# Patient Record
Sex: MALE | Race: WHITE | NOT HISPANIC OR LATINO
[De-identification: names, ages, dates, MRNs, and addresses within clinical notes are randomized per-mention and may not be internally consistent; named-entity substitution may affect disease eponyms.]

---

## 2017-01-31 ENCOUNTER — OTHER (OUTPATIENT)
Age: 72
End: 2017-01-31

## 2017-01-31 ENCOUNTER — APPOINTMENT (OUTPATIENT)
Dept: OTHER | Facility: CLINIC | Age: 72
End: 2017-01-31

## 2017-01-31 VITALS
DIASTOLIC BLOOD PRESSURE: 80 MMHG | BODY MASS INDEX: 38.37 KG/M2 | HEART RATE: 90 BPM | WEIGHT: 268 LBS | OXYGEN SATURATION: 96 % | SYSTOLIC BLOOD PRESSURE: 126 MMHG | HEIGHT: 70 IN

## 2017-02-07 ENCOUNTER — APPOINTMENT (OUTPATIENT)
Dept: DERMATOLOGY | Facility: CLINIC | Age: 72
End: 2017-02-07

## 2017-02-10 ENCOUNTER — APPOINTMENT (OUTPATIENT)
Dept: UROLOGY | Facility: CLINIC | Age: 72
End: 2017-02-10

## 2017-02-10 VITALS
SYSTOLIC BLOOD PRESSURE: 110 MMHG | WEIGHT: 261 LBS | BODY MASS INDEX: 37.37 KG/M2 | DIASTOLIC BLOOD PRESSURE: 74 MMHG | TEMPERATURE: 98.2 F | HEIGHT: 70 IN | HEART RATE: 89 BPM

## 2017-02-10 RX ORDER — ERGOCALCIFEROL 1.25 MG/1
1.25 MG CAPSULE, LIQUID FILLED ORAL
Qty: 4 | Refills: 0 | Status: ACTIVE | COMMUNITY
Start: 2016-09-29

## 2017-02-10 RX ORDER — CHLORHEXIDINE GLUCONATE 4 %
1000 LIQUID (ML) TOPICAL
Qty: 130 | Refills: 0 | Status: ACTIVE | COMMUNITY
Start: 2016-09-29

## 2017-02-13 LAB
APPEARANCE: CLEAR
BACTERIA: NEGATIVE
BILIRUBIN URINE: ABNORMAL
BLOOD URINE: NEGATIVE
COLOR: ABNORMAL
GLUCOSE QUALITATIVE U: NORMAL MG/DL
HYALINE CASTS: 14 /LPF
KETONES URINE: ABNORMAL
LEUKOCYTE ESTERASE URINE: NEGATIVE
MICROSCOPIC-UA: NORMAL
NITRITE URINE: NEGATIVE
PH URINE: 5.5
PROTEIN URINE: NEGATIVE MG/DL
PSA FREE FLD-MCNC: 17.3 %
PSA FREE SERPL-MCNC: 0.29 NG/ML
PSA SERPL-MCNC: 1.7 NG/ML
RED BLOOD CELLS URINE: 3 /HPF
SPECIFIC GRAVITY URINE: 1.03
SQUAMOUS EPITHELIAL CELLS: 1 /HPF
UROBILINOGEN URINE: NORMAL MG/DL
WHITE BLOOD CELLS URINE: 2 /HPF

## 2017-02-17 ENCOUNTER — APPOINTMENT (OUTPATIENT)
Dept: DERMATOLOGY | Facility: CLINIC | Age: 72
End: 2017-02-17

## 2017-02-27 ENCOUNTER — OUTPATIENT (OUTPATIENT)
Dept: OUTPATIENT SERVICES | Facility: HOSPITAL | Age: 72
LOS: 1 days | End: 2017-02-27
Payer: COMMERCIAL

## 2017-02-27 ENCOUNTER — APPOINTMENT (OUTPATIENT)
Dept: ULTRASOUND IMAGING | Facility: IMAGING CENTER | Age: 72
End: 2017-02-27

## 2017-02-27 DIAGNOSIS — N50.89 OTHER SPECIFIED DISORDERS OF THE MALE GENITAL ORGANS: ICD-10-CM

## 2017-02-27 PROCEDURE — 76870 US EXAM SCROTUM: CPT

## 2017-02-28 ENCOUNTER — APPOINTMENT (OUTPATIENT)
Dept: DERMATOLOGY | Facility: CLINIC | Age: 72
End: 2017-02-28

## 2017-02-28 VITALS — DIASTOLIC BLOOD PRESSURE: 72 MMHG | SYSTOLIC BLOOD PRESSURE: 126 MMHG

## 2017-02-28 DIAGNOSIS — Z12.83 ENCOUNTER FOR SCREENING FOR MALIGNANT NEOPLASM OF SKIN: ICD-10-CM

## 2017-02-28 DIAGNOSIS — Z85.828 PERSONAL HISTORY OF OTHER MALIGNANT NEOPLASM OF SKIN: ICD-10-CM

## 2017-02-28 DIAGNOSIS — L57.0 ACTINIC KERATOSIS: ICD-10-CM

## 2017-02-28 DIAGNOSIS — L82.1 OTHER SEBORRHEIC KERATOSIS: ICD-10-CM

## 2017-03-10 ENCOUNTER — APPOINTMENT (OUTPATIENT)
Dept: UROLOGY | Facility: CLINIC | Age: 72
End: 2017-03-10

## 2017-03-10 VITALS
HEIGHT: 70 IN | OXYGEN SATURATION: 97 % | SYSTOLIC BLOOD PRESSURE: 136 MMHG | HEART RATE: 64 BPM | BODY MASS INDEX: 38.65 KG/M2 | WEIGHT: 270 LBS | DIASTOLIC BLOOD PRESSURE: 68 MMHG | TEMPERATURE: 98.2 F

## 2017-03-10 DIAGNOSIS — Z86.39 PERSONAL HISTORY OF OTHER ENDOCRINE, NUTRITIONAL AND METABOLIC DISEASE: ICD-10-CM

## 2017-03-10 DIAGNOSIS — Z85.820 PERSONAL HISTORY OF MALIGNANT MELANOMA OF SKIN: ICD-10-CM

## 2017-03-10 DIAGNOSIS — E78.5 HYPERLIPIDEMIA, UNSPECIFIED: ICD-10-CM

## 2017-05-24 ENCOUNTER — MEDICATION RENEWAL (OUTPATIENT)
Age: 72
End: 2017-05-24

## 2017-06-13 ENCOUNTER — APPOINTMENT (OUTPATIENT)
Dept: OTHER | Facility: CLINIC | Age: 72
End: 2017-06-13

## 2017-06-13 VITALS
BODY MASS INDEX: 38.65 KG/M2 | HEIGHT: 70 IN | DIASTOLIC BLOOD PRESSURE: 80 MMHG | OXYGEN SATURATION: 91 % | WEIGHT: 270 LBS | SYSTOLIC BLOOD PRESSURE: 129 MMHG | HEART RATE: 88 BPM

## 2017-06-13 VITALS — TEMPERATURE: 97.7 F

## 2017-06-13 RX ORDER — DICLOFENAC SODIUM 50 MG/1
50 TABLET, DELAYED RELEASE ORAL
Qty: 30 | Refills: 0 | Status: COMPLETED | COMMUNITY
Start: 2016-12-08 | End: 2017-06-13

## 2017-06-13 RX ORDER — TOBRAMYCIN AND DEXAMETHASONE 3; 1 MG/ML; MG/ML
0.3-0.1 SUSPENSION/ DROPS OPHTHALMIC
Qty: 10 | Refills: 0 | Status: COMPLETED | COMMUNITY
Start: 2016-07-28 | End: 2017-06-13

## 2017-06-13 RX ORDER — ICOSAPENT ETHYL 1000 MG/1
1 CAPSULE ORAL
Qty: 120 | Refills: 0 | Status: COMPLETED | COMMUNITY
Start: 2016-07-20 | End: 2017-06-13

## 2017-06-13 RX ORDER — NAPROXEN 375 MG/1
375 TABLET ORAL
Qty: 20 | Refills: 0 | Status: COMPLETED | COMMUNITY
Start: 2016-11-21 | End: 2017-06-13

## 2017-07-03 ENCOUNTER — APPOINTMENT (OUTPATIENT)
Dept: PULMONOLOGY | Facility: CLINIC | Age: 72
End: 2017-07-03

## 2017-07-14 ENCOUNTER — APPOINTMENT (OUTPATIENT)
Dept: PULMONOLOGY | Facility: CLINIC | Age: 72
End: 2017-07-14

## 2017-07-14 VITALS
HEART RATE: 74 BPM | RESPIRATION RATE: 16 BRPM | TEMPERATURE: 98 F | DIASTOLIC BLOOD PRESSURE: 80 MMHG | HEIGHT: 70 IN | SYSTOLIC BLOOD PRESSURE: 139 MMHG | BODY MASS INDEX: 37.8 KG/M2 | WEIGHT: 264 LBS | OXYGEN SATURATION: 95 %

## 2017-07-14 DIAGNOSIS — R05 COUGH: ICD-10-CM

## 2017-07-14 DIAGNOSIS — R06.00 DYSPNEA, UNSPECIFIED: ICD-10-CM

## 2017-07-14 RX ORDER — BUPROPION HYDROCHLORIDE 100 MG/1
100 TABLET, FILM COATED, EXTENDED RELEASE ORAL
Qty: 30 | Refills: 0 | Status: COMPLETED | COMMUNITY
Start: 2017-06-21

## 2017-07-14 RX ORDER — BUPROPION HYDROCHLORIDE 150 MG/1
150 TABLET, EXTENDED RELEASE ORAL
Qty: 30 | Refills: 0 | Status: COMPLETED | COMMUNITY
Start: 2017-06-21

## 2017-08-25 ENCOUNTER — APPOINTMENT (OUTPATIENT)
Dept: PULMONOLOGY | Facility: CLINIC | Age: 72
End: 2017-08-25

## 2017-09-05 ENCOUNTER — MEDICATION RENEWAL (OUTPATIENT)
Age: 72
End: 2017-09-05

## 2017-09-12 ENCOUNTER — APPOINTMENT (OUTPATIENT)
Dept: DERMATOLOGY | Facility: CLINIC | Age: 72
End: 2017-09-12

## 2017-09-29 ENCOUNTER — APPOINTMENT (OUTPATIENT)
Dept: UROLOGY | Facility: CLINIC | Age: 72
End: 2017-09-29
Payer: MEDICARE

## 2017-09-29 VITALS
RESPIRATION RATE: 18 BRPM | HEIGHT: 70 IN | OXYGEN SATURATION: 96 % | SYSTOLIC BLOOD PRESSURE: 122 MMHG | HEART RATE: 70 BPM | BODY MASS INDEX: 39.22 KG/M2 | DIASTOLIC BLOOD PRESSURE: 80 MMHG | TEMPERATURE: 98 F | WEIGHT: 274 LBS

## 2017-09-29 DIAGNOSIS — K40.90 UNILATERAL INGUINAL HERNIA, W/OUT OBSTRUCTION OR GANGRENE, NOT SPECIFIED AS RECURRENT: ICD-10-CM

## 2017-09-29 DIAGNOSIS — Z85.820 PERSONAL HISTORY OF MALIGNANT MELANOMA OF SKIN: ICD-10-CM

## 2017-09-29 DIAGNOSIS — N43.40 SPERMATOCELE OF EPIDIDYMIS, UNSPECIFIED: ICD-10-CM

## 2017-09-29 DIAGNOSIS — N40.1 BENIGN PROSTATIC HYPERPLASIA WITH LOWER URINARY TRACT SYMPMS: ICD-10-CM

## 2017-09-29 DIAGNOSIS — N50.89 OTHER SPECIFIED DISORDERS OF THE MALE GENITAL ORGANS: ICD-10-CM

## 2017-09-29 PROCEDURE — 99213 OFFICE O/P EST LOW 20 MIN: CPT

## 2017-11-02 ENCOUNTER — APPOINTMENT (OUTPATIENT)
Dept: OTHER | Facility: CLINIC | Age: 72
End: 2017-11-02
Payer: COMMERCIAL

## 2017-11-02 VITALS
HEIGHT: 69 IN | RESPIRATION RATE: 18 BRPM | BODY MASS INDEX: 40.58 KG/M2 | OXYGEN SATURATION: 95 % | HEART RATE: 68 BPM | WEIGHT: 274 LBS | DIASTOLIC BLOOD PRESSURE: 77 MMHG | SYSTOLIC BLOOD PRESSURE: 129 MMHG

## 2017-11-02 DIAGNOSIS — C44.310 BASAL CELL CARCINOMA OF SKIN OF UNSPECIFIED PARTS OF FACE: ICD-10-CM

## 2017-11-02 PROCEDURE — 94010 BREATHING CAPACITY TEST: CPT

## 2017-11-02 PROCEDURE — 99397 PER PM REEVAL EST PAT 65+ YR: CPT

## 2017-11-02 PROCEDURE — 99214 OFFICE O/P EST MOD 30 MIN: CPT | Mod: 25

## 2017-11-02 PROCEDURE — 96150: CPT

## 2017-11-02 RX ORDER — PREDNISONE 10 MG/1
10 TABLET ORAL
Qty: 10 | Refills: 0 | Status: COMPLETED | COMMUNITY
Start: 2017-06-13 | End: 2017-11-02

## 2017-11-02 RX ORDER — AZITHROMYCIN 250 MG/1
250 TABLET, FILM COATED ORAL
Qty: 6 | Refills: 0 | Status: COMPLETED | COMMUNITY
Start: 2017-06-13 | End: 2017-11-02

## 2017-11-02 RX ORDER — GUAIFENESIN 600 MG/1
600 TABLET, EXTENDED RELEASE ORAL
Qty: 60 | Refills: 3 | Status: COMPLETED | COMMUNITY
Start: 2017-01-31 | End: 2017-11-02

## 2017-11-02 RX ORDER — CYCLOBENZAPRINE HYDROCHLORIDE 10 MG/1
10 TABLET, FILM COATED ORAL
Qty: 30 | Refills: 0 | Status: COMPLETED | COMMUNITY
Start: 2016-11-21 | End: 2017-11-02

## 2017-11-02 RX ORDER — BENZONATATE 100 MG/1
100 CAPSULE ORAL
Qty: 45 | Refills: 1 | Status: COMPLETED | COMMUNITY
Start: 2017-06-13 | End: 2017-11-02

## 2017-11-03 LAB
ALBUMIN SERPL ELPH-MCNC: 4.1 G/DL
ALP BLD-CCNC: 50 U/L
ALT SERPL-CCNC: 31 U/L
ANION GAP SERPL CALC-SCNC: 18 MMOL/L
APPEARANCE: CLEAR
AST SERPL-CCNC: 26 U/L
BASOPHILS # BLD AUTO: 0.04 K/UL
BASOPHILS NFR BLD AUTO: 0.5 %
BILIRUB SERPL-MCNC: 0.3 MG/DL
BILIRUBIN URINE: NEGATIVE
BLOOD URINE: NEGATIVE
BUN SERPL-MCNC: 16 MG/DL
CALCIUM SERPL-MCNC: 8.7 MG/DL
CHLORIDE SERPL-SCNC: 103 MMOL/L
CHOLEST SERPL-MCNC: 136 MG/DL
CHOLEST/HDLC SERPL: 4.4 RATIO
CO2 SERPL-SCNC: 22 MMOL/L
COLOR: YELLOW
CREAT SERPL-MCNC: 1.06 MG/DL
EOSINOPHIL # BLD AUTO: 0.16 K/UL
EOSINOPHIL NFR BLD AUTO: 2.2 %
GLUCOSE QUALITATIVE U: NEGATIVE MG/DL
GLUCOSE SERPL-MCNC: 111 MG/DL
HCT VFR BLD CALC: 47.1 %
HDLC SERPL-MCNC: 31 MG/DL
HGB BLD-MCNC: 15.3 G/DL
IMM GRANULOCYTES NFR BLD AUTO: 0.1 %
KETONES URINE: NEGATIVE
LDLC SERPL CALC-MCNC: 71 MG/DL
LEUKOCYTE ESTERASE URINE: NEGATIVE
LYMPHOCYTES # BLD AUTO: 2.34 K/UL
LYMPHOCYTES NFR BLD AUTO: 32 %
MAN DIFF?: NORMAL
MCHC RBC-ENTMCNC: 30.7 PG
MCHC RBC-ENTMCNC: 32.5 GM/DL
MCV RBC AUTO: 94.6 FL
MONOCYTES # BLD AUTO: 0.56 K/UL
MONOCYTES NFR BLD AUTO: 7.7 %
NEUTROPHILS # BLD AUTO: 4.21 K/UL
NEUTROPHILS NFR BLD AUTO: 57.5 %
NITRITE URINE: NEGATIVE
PH URINE: 5.5
PLATELET # BLD AUTO: 165 K/UL
POTASSIUM SERPL-SCNC: 4.7 MMOL/L
PROT SERPL-MCNC: 6.8 G/DL
PROTEIN URINE: NEGATIVE MG/DL
RBC # BLD: 4.98 M/UL
RBC # FLD: 14.5 %
SODIUM SERPL-SCNC: 143 MMOL/L
SPECIFIC GRAVITY URINE: 1.03
TRIGL SERPL-MCNC: 172 MG/DL
UROBILINOGEN URINE: NEGATIVE MG/DL
WBC # FLD AUTO: 7.32 K/UL

## 2018-01-25 ENCOUNTER — APPOINTMENT (OUTPATIENT)
Dept: GASTROENTEROLOGY | Facility: CLINIC | Age: 73
End: 2018-01-25
Payer: MEDICARE

## 2018-01-25 VITALS
HEART RATE: 62 BPM | TEMPERATURE: 97.9 F | OXYGEN SATURATION: 96 % | DIASTOLIC BLOOD PRESSURE: 82 MMHG | WEIGHT: 274 LBS | HEIGHT: 69 IN | SYSTOLIC BLOOD PRESSURE: 134 MMHG | BODY MASS INDEX: 40.58 KG/M2

## 2018-01-25 DIAGNOSIS — Z12.11 ENCOUNTER FOR SCREENING FOR MALIGNANT NEOPLASM OF COLON: ICD-10-CM

## 2018-01-25 PROCEDURE — 99204 OFFICE O/P NEW MOD 45 MIN: CPT

## 2018-01-25 RX ORDER — POLYETHYLENE GLYCOL 3350 17 G/17G
17 POWDER, FOR SOLUTION ORAL
Qty: 238 | Refills: 0 | Status: ACTIVE | COMMUNITY
Start: 2018-01-25 | End: 1900-01-01

## 2018-02-09 ENCOUNTER — APPOINTMENT (OUTPATIENT)
Dept: DERMATOLOGY | Facility: CLINIC | Age: 73
End: 2018-02-09

## 2018-03-01 ENCOUNTER — RX RENEWAL (OUTPATIENT)
Age: 73
End: 2018-03-01

## 2018-04-07 ENCOUNTER — RX RENEWAL (OUTPATIENT)
Age: 73
End: 2018-04-07

## 2018-06-12 ENCOUNTER — MEDICATION RENEWAL (OUTPATIENT)
Age: 73
End: 2018-06-12

## 2018-07-24 PROBLEM — C44.310 BASAL CELL CARCINOMA OF FACE: Status: ACTIVE | Noted: 2017-11-02

## 2018-09-11 ENCOUNTER — RX RENEWAL (OUTPATIENT)
Age: 73
End: 2018-09-11

## 2018-11-19 ENCOUNTER — APPOINTMENT (OUTPATIENT)
Dept: OTHER | Facility: CLINIC | Age: 73
End: 2018-11-19
Payer: COMMERCIAL

## 2018-11-19 VITALS
SYSTOLIC BLOOD PRESSURE: 128 MMHG | HEART RATE: 66 BPM | DIASTOLIC BLOOD PRESSURE: 80 MMHG | RESPIRATION RATE: 16 BRPM | HEIGHT: 70 IN | OXYGEN SATURATION: 96 % | BODY MASS INDEX: 38.94 KG/M2 | WEIGHT: 272 LBS

## 2018-11-19 DIAGNOSIS — J32.9 CHRONIC SINUSITIS, UNSPECIFIED: ICD-10-CM

## 2018-11-19 DIAGNOSIS — G47.33 OBSTRUCTIVE SLEEP APNEA (ADULT) (PEDIATRIC): ICD-10-CM

## 2018-11-19 DIAGNOSIS — K21.9 GASTRO-ESOPHAGEAL REFLUX DISEASE W/OUT ESOPHAGITIS: ICD-10-CM

## 2018-11-19 DIAGNOSIS — Z87.891 PERSONAL HISTORY OF NICOTINE DEPENDENCE: ICD-10-CM

## 2018-11-19 DIAGNOSIS — Z04.9 ENCOUNTER FOR EXAMINATION AND OBSERVATION FOR UNSPECIFIED REASON: ICD-10-CM

## 2018-11-19 PROCEDURE — 94010 BREATHING CAPACITY TEST: CPT

## 2018-11-19 PROCEDURE — 96150: CPT

## 2018-11-19 PROCEDURE — 99396 PREV VISIT EST AGE 40-64: CPT | Mod: 25

## 2018-11-19 PROCEDURE — G0008: CPT

## 2018-11-19 PROCEDURE — 99214 OFFICE O/P EST MOD 30 MIN: CPT | Mod: 25

## 2018-11-19 PROCEDURE — 90686 IIV4 VACC NO PRSV 0.5 ML IM: CPT

## 2018-11-19 RX ORDER — FOLIC ACID 1 MG/1
1 TABLET ORAL
Qty: 150 | Refills: 0 | Status: COMPLETED | COMMUNITY
Start: 2016-12-08 | End: 2018-11-19

## 2018-11-19 RX ORDER — MAG HYDROX/ALUMINUM HYD/SIMETH 200-225-25
SUSPENSION, ORAL (FINAL DOSE FORM) ORAL
Refills: 0 | Status: COMPLETED | COMMUNITY
End: 2018-11-19

## 2018-11-19 RX ORDER — TAMSULOSIN HYDROCHLORIDE 0.4 MG/1
0.4 CAPSULE ORAL
Qty: 90 | Refills: 4 | Status: COMPLETED | COMMUNITY
Start: 2017-02-10 | End: 2018-11-19

## 2018-11-19 RX ORDER — FINASTERIDE 5 MG/1
5 TABLET, FILM COATED ORAL
Qty: 90 | Refills: 0 | Status: COMPLETED | COMMUNITY
Start: 2017-03-10 | End: 2018-11-19

## 2018-11-20 LAB
ALBUMIN SERPL ELPH-MCNC: 4.2 G/DL
ALP BLD-CCNC: 53 U/L
ALT SERPL-CCNC: 20 U/L
ANION GAP SERPL CALC-SCNC: 11 MMOL/L
APPEARANCE: CLEAR
AST SERPL-CCNC: 16 U/L
BACTERIA: NEGATIVE
BASOPHILS # BLD AUTO: 0.03 K/UL
BASOPHILS NFR BLD AUTO: 0.3 %
BILIRUB SERPL-MCNC: 0.4 MG/DL
BILIRUBIN URINE: NEGATIVE
BLOOD URINE: NEGATIVE
BUN SERPL-MCNC: 19 MG/DL
CALCIUM SERPL-MCNC: 8.3 MG/DL
CHLORIDE SERPL-SCNC: 106 MMOL/L
CHOLEST SERPL-MCNC: 137 MG/DL
CHOLEST/HDLC SERPL: 4.4 RATIO
CO2 SERPL-SCNC: 26 MMOL/L
COLOR: YELLOW
CREAT SERPL-MCNC: 1.14 MG/DL
EOSINOPHIL # BLD AUTO: 0.1 K/UL
EOSINOPHIL NFR BLD AUTO: 1 %
GLUCOSE QUALITATIVE U: NEGATIVE MG/DL
GLUCOSE SERPL-MCNC: 114 MG/DL
HCT VFR BLD CALC: 48.6 %
HDLC SERPL-MCNC: 31 MG/DL
HGB BLD-MCNC: 15.4 G/DL
HYALINE CASTS: 2 /LPF
IMM GRANULOCYTES NFR BLD AUTO: 0.3 %
KETONES URINE: NEGATIVE
LDLC SERPL CALC-MCNC: 72 MG/DL
LEUKOCYTE ESTERASE URINE: NEGATIVE
LYMPHOCYTES # BLD AUTO: 2.57 K/UL
LYMPHOCYTES NFR BLD AUTO: 26.9 %
MAN DIFF?: NORMAL
MCHC RBC-ENTMCNC: 31.1 PG
MCHC RBC-ENTMCNC: 31.7 GM/DL
MCV RBC AUTO: 98.2 FL
MICROSCOPIC-UA: NORMAL
MONOCYTES # BLD AUTO: 0.66 K/UL
MONOCYTES NFR BLD AUTO: 6.9 %
NEUTROPHILS # BLD AUTO: 6.17 K/UL
NEUTROPHILS NFR BLD AUTO: 64.6 %
NITRITE URINE: NEGATIVE
PH URINE: 5
PLATELET # BLD AUTO: 160 K/UL
POTASSIUM SERPL-SCNC: 4.6 MMOL/L
PROT SERPL-MCNC: 6.9 G/DL
PROTEIN URINE: NEGATIVE MG/DL
RBC # BLD: 4.95 M/UL
RBC # FLD: 14.5 %
RED BLOOD CELLS URINE: 1 /HPF
SODIUM SERPL-SCNC: 143 MMOL/L
SPECIFIC GRAVITY URINE: 1.03
SQUAMOUS EPITHELIAL CELLS: 1 /HPF
TRIGL SERPL-MCNC: 172 MG/DL
UROBILINOGEN URINE: NEGATIVE MG/DL
WBC # FLD AUTO: 9.56 K/UL
WHITE BLOOD CELLS URINE: 1 /HPF

## 2018-11-29 ENCOUNTER — APPOINTMENT (OUTPATIENT)
Dept: DERMATOLOGY | Facility: CLINIC | Age: 73
End: 2018-11-29

## 2018-12-02 ENCOUNTER — FORM ENCOUNTER (OUTPATIENT)
Age: 73
End: 2018-12-02

## 2018-12-09 ENCOUNTER — FORM ENCOUNTER (OUTPATIENT)
Age: 73
End: 2018-12-09

## 2019-01-29 ENCOUNTER — APPOINTMENT (OUTPATIENT)
Dept: GASTROENTEROLOGY | Facility: CLINIC | Age: 74
End: 2019-01-29

## 2019-04-21 ENCOUNTER — TRANSCRIPTION ENCOUNTER (OUTPATIENT)
Age: 74
End: 2019-04-21

## 2019-05-02 ENCOUNTER — FORM ENCOUNTER (OUTPATIENT)
Age: 74
End: 2019-05-02

## 2019-05-16 ENCOUNTER — FORM ENCOUNTER (OUTPATIENT)
Age: 74
End: 2019-05-16

## 2019-06-17 ENCOUNTER — APPOINTMENT (OUTPATIENT)
Dept: PSYCHIATRY | Facility: CLINIC | Age: 74
End: 2019-06-17
Payer: COMMERCIAL

## 2019-06-17 DIAGNOSIS — F41.0 PANIC DISORDER [EPISODIC PAROXYSMAL ANXIETY]: ICD-10-CM

## 2019-06-17 DIAGNOSIS — F33.9 MAJOR DEPRESSIVE DISORDER, RECURRENT, UNSPECIFIED: ICD-10-CM

## 2019-06-17 DIAGNOSIS — F51.04 PSYCHOPHYSIOLOGIC INSOMNIA: ICD-10-CM

## 2019-06-17 DIAGNOSIS — F41.9 ANXIETY DISORDER, UNSPECIFIED: ICD-10-CM

## 2019-06-17 DIAGNOSIS — F10.21 ALCOHOL DEPENDENCE, IN REMISSION: ICD-10-CM

## 2019-06-17 DIAGNOSIS — F43.10 POST-TRAUMATIC STRESS DISORDER, UNSPECIFIED: ICD-10-CM

## 2019-06-17 PROCEDURE — 99205 OFFICE O/P NEW HI 60 MIN: CPT

## 2019-06-17 RX ORDER — CLONAZEPAM 0.25 MG/1
0.25 TABLET, ORALLY DISINTEGRATING ORAL DAILY
Qty: 30 | Refills: 0 | Status: ACTIVE | COMMUNITY
Start: 2016-08-25 | End: 1900-01-01

## 2019-06-17 RX ORDER — CITALOPRAM HYDROBROMIDE 20 MG/1
20 TABLET, FILM COATED ORAL
Qty: 30 | Refills: 2 | Status: ACTIVE | COMMUNITY
Start: 2019-06-17 | End: 1900-01-01

## 2019-07-01 ENCOUNTER — APPOINTMENT (OUTPATIENT)
Dept: PULMONOLOGY | Facility: CLINIC | Age: 74
End: 2019-07-01

## 2019-07-26 ENCOUNTER — EMERGENCY (EMERGENCY)
Facility: HOSPITAL | Age: 74
LOS: 1 days | Discharge: HOME | End: 2019-07-26
Attending: EMERGENCY MEDICINE
Payer: COMMERCIAL

## 2019-07-26 VITALS
DIASTOLIC BLOOD PRESSURE: 53 MMHG | TEMPERATURE: 98 F | SYSTOLIC BLOOD PRESSURE: 124 MMHG | RESPIRATION RATE: 18 BRPM | HEART RATE: 62 BPM | OXYGEN SATURATION: 98 %

## 2019-07-26 VITALS
TEMPERATURE: 99 F | WEIGHT: 229.94 LBS | RESPIRATION RATE: 18 BRPM | HEART RATE: 60 BPM | SYSTOLIC BLOOD PRESSURE: 107 MMHG | HEIGHT: 70 IN | OXYGEN SATURATION: 98 % | DIASTOLIC BLOOD PRESSURE: 67 MMHG

## 2019-07-26 LAB
ALBUMIN SERPL ELPH-MCNC: 3.4 G/DL — LOW (ref 3.5–5)
ALP SERPL-CCNC: 58 U/L — SIGNIFICANT CHANGE UP (ref 40–120)
ALT FLD-CCNC: 48 U/L DA — SIGNIFICANT CHANGE UP (ref 10–60)
ANION GAP SERPL CALC-SCNC: 8 MMOL/L — SIGNIFICANT CHANGE UP (ref 5–17)
AST SERPL-CCNC: 24 U/L — SIGNIFICANT CHANGE UP (ref 10–40)
BASE EXCESS BLDV CALC-SCNC: 1.2 MMOL/L — SIGNIFICANT CHANGE UP (ref -2–2)
BASOPHILS # BLD AUTO: 0.05 K/UL — SIGNIFICANT CHANGE UP (ref 0–0.2)
BASOPHILS NFR BLD AUTO: 0.5 % — SIGNIFICANT CHANGE UP (ref 0–2)
BILIRUB SERPL-MCNC: 0.6 MG/DL — SIGNIFICANT CHANGE UP (ref 0.2–1.2)
BLOOD GAS COMMENTS, VENOUS: SIGNIFICANT CHANGE UP
BUN SERPL-MCNC: 18 MG/DL — SIGNIFICANT CHANGE UP (ref 7–18)
CALCIUM SERPL-MCNC: 8.2 MG/DL — LOW (ref 8.4–10.5)
CHLORIDE SERPL-SCNC: 106 MMOL/L — SIGNIFICANT CHANGE UP (ref 96–108)
CO2 SERPL-SCNC: 23 MMOL/L — SIGNIFICANT CHANGE UP (ref 22–31)
CREAT SERPL-MCNC: 1.21 MG/DL — SIGNIFICANT CHANGE UP (ref 0.5–1.3)
EOSINOPHIL # BLD AUTO: 0.33 K/UL — SIGNIFICANT CHANGE UP (ref 0–0.5)
EOSINOPHIL NFR BLD AUTO: 3 % — SIGNIFICANT CHANGE UP (ref 0–6)
GLUCOSE SERPL-MCNC: 103 MG/DL — HIGH (ref 70–99)
HCO3 BLDV-SCNC: 23 MMOL/L — SIGNIFICANT CHANGE UP (ref 21–29)
HCT VFR BLD CALC: 41.5 % — SIGNIFICANT CHANGE UP (ref 39–50)
HGB BLD-MCNC: 13.6 G/DL — SIGNIFICANT CHANGE UP (ref 13–17)
HOROWITZ INDEX BLDV+IHG-RTO: 21 — SIGNIFICANT CHANGE UP
IMM GRANULOCYTES NFR BLD AUTO: 0.3 % — SIGNIFICANT CHANGE UP (ref 0–1.5)
LYMPHOCYTES # BLD AUTO: 28.9 % — SIGNIFICANT CHANGE UP (ref 13–44)
LYMPHOCYTES # BLD AUTO: 3.17 K/UL — SIGNIFICANT CHANGE UP (ref 1–3.3)
MCHC RBC-ENTMCNC: 30.5 PG — SIGNIFICANT CHANGE UP (ref 27–34)
MCHC RBC-ENTMCNC: 32.8 GM/DL — SIGNIFICANT CHANGE UP (ref 32–36)
MCV RBC AUTO: 93 FL — SIGNIFICANT CHANGE UP (ref 80–100)
MONOCYTES # BLD AUTO: 0.83 K/UL — SIGNIFICANT CHANGE UP (ref 0–0.9)
MONOCYTES NFR BLD AUTO: 7.6 % — SIGNIFICANT CHANGE UP (ref 2–14)
NEUTROPHILS # BLD AUTO: 6.55 K/UL — SIGNIFICANT CHANGE UP (ref 1.8–7.4)
NEUTROPHILS NFR BLD AUTO: 59.7 % — SIGNIFICANT CHANGE UP (ref 43–77)
NRBC # BLD: 0 /100 WBCS — SIGNIFICANT CHANGE UP (ref 0–0)
NT-PROBNP SERPL-SCNC: 141 PG/ML — HIGH (ref 0–125)
PCO2 BLDV: 30 MMHG — LOW (ref 35–50)
PH BLDV: 7.5 — HIGH (ref 7.35–7.45)
PLATELET # BLD AUTO: 173 K/UL — SIGNIFICANT CHANGE UP (ref 150–400)
PO2 BLDV: 34 MMHG — SIGNIFICANT CHANGE UP (ref 25–45)
POTASSIUM SERPL-MCNC: 4.1 MMOL/L — SIGNIFICANT CHANGE UP (ref 3.5–5.3)
POTASSIUM SERPL-SCNC: 4.1 MMOL/L — SIGNIFICANT CHANGE UP (ref 3.5–5.3)
PROT SERPL-MCNC: 6.9 G/DL — SIGNIFICANT CHANGE UP (ref 6–8.3)
RBC # BLD: 4.46 M/UL — SIGNIFICANT CHANGE UP (ref 4.2–5.8)
RBC # FLD: 13.8 % — SIGNIFICANT CHANGE UP (ref 10.3–14.5)
SAO2 % BLDV: 68 % — SIGNIFICANT CHANGE UP (ref 67–88)
SODIUM SERPL-SCNC: 137 MMOL/L — SIGNIFICANT CHANGE UP (ref 135–145)
TROPONIN I SERPL-MCNC: <0.015 NG/ML — SIGNIFICANT CHANGE UP (ref 0–0.04)
WBC # BLD: 10.96 K/UL — HIGH (ref 3.8–10.5)
WBC # FLD AUTO: 10.96 K/UL — HIGH (ref 3.8–10.5)

## 2019-07-26 PROCEDURE — 93005 ELECTROCARDIOGRAM TRACING: CPT

## 2019-07-26 PROCEDURE — 99283 EMERGENCY DEPT VISIT LOW MDM: CPT

## 2019-07-26 PROCEDURE — 99283 EMERGENCY DEPT VISIT LOW MDM: CPT | Mod: 25

## 2019-07-26 PROCEDURE — 71046 X-RAY EXAM CHEST 2 VIEWS: CPT | Mod: 26

## 2019-07-26 PROCEDURE — 82803 BLOOD GASES ANY COMBINATION: CPT

## 2019-07-26 PROCEDURE — 36415 COLL VENOUS BLD VENIPUNCTURE: CPT

## 2019-07-26 PROCEDURE — 71046 X-RAY EXAM CHEST 2 VIEWS: CPT

## 2019-07-26 PROCEDURE — 84484 ASSAY OF TROPONIN QUANT: CPT

## 2019-07-26 PROCEDURE — 85027 COMPLETE CBC AUTOMATED: CPT

## 2019-07-26 PROCEDURE — 80053 COMPREHEN METABOLIC PANEL: CPT

## 2019-07-26 PROCEDURE — 83880 ASSAY OF NATRIURETIC PEPTIDE: CPT

## 2019-07-26 RX ORDER — DIAZEPAM 5 MG
5 TABLET ORAL ONCE
Refills: 0 | Status: DISCONTINUED | OUTPATIENT
Start: 2019-07-26 | End: 2019-07-26

## 2019-07-26 RX ADMIN — Medication 5 MILLIGRAM(S): at 09:16

## 2019-07-26 NOTE — ED PROVIDER NOTE - CLINICAL SUMMARY MEDICAL DECISION MAKING FREE TEXT BOX
72 y/o M patient presents to the ER c/o throat tightness as well as shortness of breath. Will obtain labs, CXR, EKG, give valium and reassess.

## 2019-07-26 NOTE — ED PROVIDER NOTE - CONSTITUTIONAL, MLM
normal... Well appearing, well nourished, awake, alert, oriented to person, place, time/situation and in no apparent distress. Well appearing resting comfortable in stretcher, well nourished, awake, alert, oriented to person, place, time/situation and in no acute distress.

## 2019-07-26 NOTE — ED ADULT NURSE NOTE - OBJECTIVE STATEMENT
Pt present to ED, c/o SOB on exertion. Denies any pain, mild sob without using accessary muscle noted. speak clear and full sentences, non diaphoretic.

## 2019-07-26 NOTE — ED PROVIDER NOTE - OBJECTIVE STATEMENT
72 y/o M pt with a PMHx of anxiety, hypothyroidism, presents to the ED with complaints of shortness of breath  x 1 week and a sensation that his throat is closing up. Patient states he was recently discharged from [facility] for the same complaint. 74 y/o M pt with a PMHx of anxiety, hypothyroidism, presents to the ED with complaints of shortness of breath  x 1 week and a sensation that his throat is closing up. Patient states he was recently discharged from [facility] for the same complaint. Patient reports he currently feels anxious and used his albuterol inhaler his morning with no improvement. Patient denies chest pain, cough, fever, nausea, vomiting, diarrhea, abdominal pain, headache, dizziness or any other complaints. SHx:  at 911.

## 2019-07-26 NOTE — ED ADULT TRIAGE NOTE - CHIEF COMPLAINT QUOTE
BIBA for c/o shortness of breath  states he was just recently discharge in a hospital for the same problem. pt no labored breathing  able to speak in full sentences.

## 2019-07-26 NOTE — ED PROVIDER NOTE - CHPI ED SYMPTOMS NEG
no fever/no headache/no nausea, no vomiting, no diarrhea, no abdominal pain, no dizziness/no chest pain/no cough

## 2019-07-26 NOTE — ED PROVIDER NOTE - NSFOLLOWUPINSTRUCTIONS_ED_ALL_ED_FT
Anxiety    Generalized anxiety disorder (CHAPIS) is a mental disorder. It is defined as anxiety that is not necessarily related to specific events or activities or is out of proportion to said events. Symptoms include restlessness, fatigue, difficulty concentrations, irritability and difficulty concentrating. It may interfere with life functions, including relationships, work, and school. If you were started on a medication, make sure to take exactly as prescribed and follow up with a psychiatrist.    SEEK IMMEDIATE MEDICAL CARE IF YOU HAVE ANY OF THE FOLLOWING SYMPTOMS: thoughts about hurting killing yourself, thoughts about hurting or killing somebody else, hallucinations, or worsening depression.

## 2019-07-31 PROBLEM — F41.9 ANXIETY DISORDER, UNSPECIFIED: Chronic | Status: ACTIVE | Noted: 2019-07-26

## 2019-07-31 PROBLEM — E03.9 HYPOTHYROIDISM, UNSPECIFIED: Chronic | Status: ACTIVE | Noted: 2019-07-26

## 2019-08-01 ENCOUNTER — APPOINTMENT (OUTPATIENT)
Dept: OTHER | Facility: CLINIC | Age: 74
End: 2019-08-01
Payer: COMMERCIAL

## 2019-08-01 VITALS
HEIGHT: 70 IN | BODY MASS INDEX: 38.94 KG/M2 | DIASTOLIC BLOOD PRESSURE: 67 MMHG | RESPIRATION RATE: 16 BRPM | HEART RATE: 67 BPM | WEIGHT: 272 LBS | OXYGEN SATURATION: 97 % | SYSTOLIC BLOOD PRESSURE: 129 MMHG

## 2019-08-01 VITALS
HEIGHT: 70 IN | RESPIRATION RATE: 16 BRPM | DIASTOLIC BLOOD PRESSURE: 67 MMHG | HEART RATE: 67 BPM | BODY MASS INDEX: 38.94 KG/M2 | SYSTOLIC BLOOD PRESSURE: 129 MMHG | WEIGHT: 272 LBS | OXYGEN SATURATION: 97 %

## 2019-08-01 DIAGNOSIS — C44.91 BASAL CELL CARCINOMA OF SKIN, UNSPECIFIED: ICD-10-CM

## 2019-08-01 DIAGNOSIS — J41.0 SIMPLE CHRONIC BRONCHITIS: ICD-10-CM

## 2019-08-01 DIAGNOSIS — Z03.89 ENCOUNTER FOR OBSERVATION FOR OTHER SUSPECTED DISEASES AND CONDITIONS RULED OUT: ICD-10-CM

## 2019-08-01 PROCEDURE — 99214 OFFICE O/P EST MOD 30 MIN: CPT

## 2019-08-02 ENCOUNTER — INPATIENT (INPATIENT)
Facility: HOSPITAL | Age: 74
LOS: 5 days | Discharge: HOME | DRG: 191 | End: 2019-08-08
Attending: INTERNAL MEDICINE | Admitting: INTERNAL MEDICINE
Payer: MEDICARE

## 2019-08-02 VITALS
SYSTOLIC BLOOD PRESSURE: 123 MMHG | HEART RATE: 59 BPM | DIASTOLIC BLOOD PRESSURE: 74 MMHG | HEIGHT: 70 IN | RESPIRATION RATE: 22 BRPM | WEIGHT: 220.02 LBS | TEMPERATURE: 98 F | OXYGEN SATURATION: 97 %

## 2019-08-02 DIAGNOSIS — Z86.711 PERSONAL HISTORY OF PULMONARY EMBOLISM: Chronic | ICD-10-CM

## 2019-08-02 DIAGNOSIS — F41.0 PANIC DISORDER [EPISODIC PAROXYSMAL ANXIETY]: ICD-10-CM

## 2019-08-02 DIAGNOSIS — F41.9 ANXIETY DISORDER, UNSPECIFIED: ICD-10-CM

## 2019-08-02 DIAGNOSIS — F43.10 POST-TRAUMATIC STRESS DISORDER, UNSPECIFIED: ICD-10-CM

## 2019-08-02 DIAGNOSIS — E03.9 HYPOTHYROIDISM, UNSPECIFIED: Chronic | ICD-10-CM

## 2019-08-02 DIAGNOSIS — Z29.9 ENCOUNTER FOR PROPHYLACTIC MEASURES, UNSPECIFIED: ICD-10-CM

## 2019-08-02 DIAGNOSIS — Z98.890 OTHER SPECIFIED POSTPROCEDURAL STATES: Chronic | ICD-10-CM

## 2019-08-02 DIAGNOSIS — E78.5 HYPERLIPIDEMIA, UNSPECIFIED: ICD-10-CM

## 2019-08-02 DIAGNOSIS — E78.5 HYPERLIPIDEMIA, UNSPECIFIED: Chronic | ICD-10-CM

## 2019-08-02 DIAGNOSIS — J44.1 CHRONIC OBSTRUCTIVE PULMONARY DISEASE WITH (ACUTE) EXACERBATION: ICD-10-CM

## 2019-08-02 DIAGNOSIS — D64.9 ANEMIA, UNSPECIFIED: ICD-10-CM

## 2019-08-02 DIAGNOSIS — J44.9 CHRONIC OBSTRUCTIVE PULMONARY DISEASE, UNSPECIFIED: ICD-10-CM

## 2019-08-02 DIAGNOSIS — R06.00 DYSPNEA, UNSPECIFIED: ICD-10-CM

## 2019-08-02 LAB
ALBUMIN SERPL ELPH-MCNC: 3.2 G/DL — LOW (ref 3.5–5)
ALP SERPL-CCNC: 53 U/L — SIGNIFICANT CHANGE UP (ref 40–120)
ALT FLD-CCNC: 34 U/L DA — SIGNIFICANT CHANGE UP (ref 10–60)
ANION GAP SERPL CALC-SCNC: 9 MMOL/L — SIGNIFICANT CHANGE UP (ref 5–17)
APTT BLD: 32 SEC — SIGNIFICANT CHANGE UP (ref 27.5–36.3)
AST SERPL-CCNC: 30 U/L — SIGNIFICANT CHANGE UP (ref 10–40)
BASOPHILS # BLD AUTO: 0.05 K/UL — SIGNIFICANT CHANGE UP (ref 0–0.2)
BASOPHILS NFR BLD AUTO: 0.6 % — SIGNIFICANT CHANGE UP (ref 0–2)
BILIRUB SERPL-MCNC: 0.2 MG/DL — SIGNIFICANT CHANGE UP (ref 0.2–1.2)
BUN SERPL-MCNC: 27 MG/DL — HIGH (ref 7–18)
CALCIUM SERPL-MCNC: 7.3 MG/DL — LOW (ref 8.4–10.5)
CHLORIDE SERPL-SCNC: 112 MMOL/L — HIGH (ref 96–108)
CK MB BLD-MCNC: 1.9 % — SIGNIFICANT CHANGE UP (ref 0–3.5)
CK MB CFR SERPL CALC: 4.5 NG/ML — HIGH (ref 0–3.6)
CK SERPL-CCNC: 242 U/L — HIGH (ref 35–232)
CO2 SERPL-SCNC: 22 MMOL/L — SIGNIFICANT CHANGE UP (ref 22–31)
CREAT SERPL-MCNC: 1.12 MG/DL — SIGNIFICANT CHANGE UP (ref 0.5–1.3)
EOSINOPHIL # BLD AUTO: 0.14 K/UL — SIGNIFICANT CHANGE UP (ref 0–0.5)
EOSINOPHIL NFR BLD AUTO: 1.5 % — SIGNIFICANT CHANGE UP (ref 0–6)
GLUCOSE SERPL-MCNC: 96 MG/DL — SIGNIFICANT CHANGE UP (ref 70–99)
HCT VFR BLD CALC: 39.5 % — SIGNIFICANT CHANGE UP (ref 39–50)
HGB BLD-MCNC: 12.9 G/DL — LOW (ref 13–17)
IMM GRANULOCYTES NFR BLD AUTO: 0.3 % — SIGNIFICANT CHANGE UP (ref 0–1.5)
INR BLD: 1.16 RATIO — SIGNIFICANT CHANGE UP (ref 0.88–1.16)
LYMPHOCYTES # BLD AUTO: 2.92 K/UL — SIGNIFICANT CHANGE UP (ref 1–3.3)
LYMPHOCYTES # BLD AUTO: 32.3 % — SIGNIFICANT CHANGE UP (ref 13–44)
MCHC RBC-ENTMCNC: 30.8 PG — SIGNIFICANT CHANGE UP (ref 27–34)
MCHC RBC-ENTMCNC: 32.7 GM/DL — SIGNIFICANT CHANGE UP (ref 32–36)
MCV RBC AUTO: 94.3 FL — SIGNIFICANT CHANGE UP (ref 80–100)
MONOCYTES # BLD AUTO: 0.79 K/UL — SIGNIFICANT CHANGE UP (ref 0–0.9)
MONOCYTES NFR BLD AUTO: 8.7 % — SIGNIFICANT CHANGE UP (ref 2–14)
NEUTROPHILS # BLD AUTO: 5.12 K/UL — SIGNIFICANT CHANGE UP (ref 1.8–7.4)
NEUTROPHILS NFR BLD AUTO: 56.6 % — SIGNIFICANT CHANGE UP (ref 43–77)
NRBC # BLD: 0 /100 WBCS — SIGNIFICANT CHANGE UP (ref 0–0)
PLATELET # BLD AUTO: 153 K/UL — SIGNIFICANT CHANGE UP (ref 150–400)
POTASSIUM SERPL-MCNC: 4.1 MMOL/L — SIGNIFICANT CHANGE UP (ref 3.5–5.3)
POTASSIUM SERPL-SCNC: 4.1 MMOL/L — SIGNIFICANT CHANGE UP (ref 3.5–5.3)
PROT SERPL-MCNC: 6.2 G/DL — SIGNIFICANT CHANGE UP (ref 6–8.3)
PROTHROM AB SERPL-ACNC: 12.9 SEC — SIGNIFICANT CHANGE UP (ref 10–12.9)
RBC # BLD: 4.19 M/UL — LOW (ref 4.2–5.8)
RBC # FLD: 14.3 % — SIGNIFICANT CHANGE UP (ref 10.3–14.5)
SODIUM SERPL-SCNC: 143 MMOL/L — SIGNIFICANT CHANGE UP (ref 135–145)
TROPONIN I SERPL-MCNC: <0.015 NG/ML — SIGNIFICANT CHANGE UP (ref 0–0.04)
WBC # BLD: 9.05 K/UL — SIGNIFICANT CHANGE UP (ref 3.8–10.5)
WBC # FLD AUTO: 9.05 K/UL — SIGNIFICANT CHANGE UP (ref 3.8–10.5)

## 2019-08-02 PROCEDURE — 99285 EMERGENCY DEPT VISIT HI MDM: CPT

## 2019-08-02 PROCEDURE — 71275 CT ANGIOGRAPHY CHEST: CPT | Mod: 26

## 2019-08-02 RX ORDER — TIOTROPIUM BROMIDE 18 UG/1
1 CAPSULE ORAL; RESPIRATORY (INHALATION) DAILY
Refills: 0 | Status: DISCONTINUED | OUTPATIENT
Start: 2019-08-02 | End: 2019-08-08

## 2019-08-02 RX ORDER — AZITHROMYCIN 500 MG/1
500 TABLET, FILM COATED ORAL ONCE
Refills: 0 | Status: COMPLETED | OUTPATIENT
Start: 2019-08-02 | End: 2019-08-02

## 2019-08-02 RX ORDER — LEVOTHYROXINE SODIUM 125 MCG
175 TABLET ORAL DAILY
Refills: 0 | Status: DISCONTINUED | OUTPATIENT
Start: 2019-08-02 | End: 2019-08-08

## 2019-08-02 RX ORDER — CITALOPRAM 10 MG/1
20 TABLET, FILM COATED ORAL DAILY
Refills: 0 | Status: DISCONTINUED | OUTPATIENT
Start: 2019-08-02 | End: 2019-08-08

## 2019-08-02 RX ORDER — BUDESONIDE AND FORMOTEROL FUMARATE DIHYDRATE 160; 4.5 UG/1; UG/1
2 AEROSOL RESPIRATORY (INHALATION)
Refills: 0 | Status: DISCONTINUED | OUTPATIENT
Start: 2019-08-02 | End: 2019-08-08

## 2019-08-02 RX ORDER — AZITHROMYCIN 500 MG/1
TABLET, FILM COATED ORAL
Refills: 0 | Status: COMPLETED | OUTPATIENT
Start: 2019-08-02 | End: 2019-08-06

## 2019-08-02 RX ORDER — CLONAZEPAM 1 MG
0.25 TABLET ORAL DAILY
Refills: 0 | Status: DISCONTINUED | OUTPATIENT
Start: 2019-08-02 | End: 2019-08-08

## 2019-08-02 RX ORDER — APIXABAN 2.5 MG/1
5 TABLET, FILM COATED ORAL EVERY 12 HOURS
Refills: 0 | Status: DISCONTINUED | OUTPATIENT
Start: 2019-08-03 | End: 2019-08-08

## 2019-08-02 RX ORDER — IPRATROPIUM/ALBUTEROL SULFATE 18-103MCG
3 AEROSOL WITH ADAPTER (GRAM) INHALATION EVERY 6 HOURS
Refills: 0 | Status: DISCONTINUED | OUTPATIENT
Start: 2019-08-02 | End: 2019-08-08

## 2019-08-02 RX ORDER — BUPROPION HYDROCHLORIDE 150 MG/1
300 TABLET, EXTENDED RELEASE ORAL DAILY
Refills: 0 | Status: DISCONTINUED | OUTPATIENT
Start: 2019-08-02 | End: 2019-08-08

## 2019-08-02 RX ORDER — CLONAZEPAM 1 MG
0.25 TABLET ORAL DAILY
Refills: 0 | Status: DISCONTINUED | OUTPATIENT
Start: 2019-08-02 | End: 2019-08-02

## 2019-08-02 RX ORDER — AZITHROMYCIN 500 MG/1
250 TABLET, FILM COATED ORAL EVERY 24 HOURS
Refills: 0 | Status: COMPLETED | OUTPATIENT
Start: 2019-08-03 | End: 2019-08-06

## 2019-08-02 RX ORDER — ENOXAPARIN SODIUM 100 MG/ML
100 INJECTION SUBCUTANEOUS ONCE
Refills: 0 | Status: COMPLETED | OUTPATIENT
Start: 2019-08-02 | End: 2019-08-02

## 2019-08-02 RX ORDER — ENOXAPARIN SODIUM 100 MG/ML
40 INJECTION SUBCUTANEOUS DAILY
Refills: 0 | Status: DISCONTINUED | OUTPATIENT
Start: 2019-08-02 | End: 2019-08-02

## 2019-08-02 RX ORDER — SIMVASTATIN 20 MG/1
40 TABLET, FILM COATED ORAL AT BEDTIME
Refills: 0 | Status: DISCONTINUED | OUTPATIENT
Start: 2019-08-02 | End: 2019-08-08

## 2019-08-02 RX ORDER — TRAZODONE HCL 50 MG
150 TABLET ORAL DAILY
Refills: 0 | Status: DISCONTINUED | OUTPATIENT
Start: 2019-08-02 | End: 2019-08-04

## 2019-08-02 RX ADMIN — SIMVASTATIN 40 MILLIGRAM(S): 20 TABLET, FILM COATED ORAL at 22:47

## 2019-08-02 RX ADMIN — Medication 150 MILLIGRAM(S): at 23:33

## 2019-08-02 RX ADMIN — AZITHROMYCIN 250 MILLIGRAM(S): 500 TABLET, FILM COATED ORAL at 22:46

## 2019-08-02 RX ADMIN — ENOXAPARIN SODIUM 100 MILLIGRAM(S): 100 INJECTION SUBCUTANEOUS at 14:34

## 2019-08-02 NOTE — H&P ADULT - ASSESSMENT
72 yo male exsmoker with COPD, hypothyroidism, HLD, PTSD and hx of recent PE s/p 1 week of eliquis, presented to the ED with SOB and was found to be tachypneic . He c/o orthopnea, PND. CTA was done to r/o PE as his hx was significant for recent PE and noncompliance with medication. Pt was admitted for dyspnea and r/o CHF 72 yo male exsmoker with COPD, hypothyroidism, HLD, PTSD and hx of recent PE s/p 1 week of eliquis, presented to the ED with SOB and was found to be tachypneic . He c/o orthopnea, PND. CTA was done to r/o PE as his hx was significant for recent PE and noncompliance with medication. Pt was admitted for dyspnea to r/o CHF exacerbation vs COPD exacerbation. 72 yo male exsmoker with COPD, hypothyroidism, HLD, PTSD and hx of recent PE s/p 1 week of eliquis, presented to the ED with SOB and was found to be tachypneic . He c/o orthopnea, PND. CTA was done to r/o PE as his hx was significant for recent PE and noncompliance with medication. Pt was admitted for dyspnea to r/o CHF exacerbation vs COPD exacerbation.      primary team to obtain records for PE  work up from [facility]

## 2019-08-02 NOTE — H&P ADULT - HISTORY OF PRESENT ILLNESS
74 yo male exsmoker with COPD, hypothyroidism, HLD, PTSD and hx of recent PE s/p 1 week of eliquis, came with complains of difficulty breathing. Pt states his SOB started a month ago and he has been admitted to the hospital 3 times in the past month. He states he has SOB with exertion, lying flat in bed, and wakes up gasping for air. He says he has lower ext edema. 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 Pt was admitted to [facility] 3 weeks ago and was diagnosed with PE. He was discharged on 7 days of Eliquis and didn't follow up with a doctor about this. Pt doesn't remember how he was this morning but had an appointment with his PCP who heard about his recent complains and told him to seek immediate attention at the ED. He said he was feeling SOB, dizzy, blurry vision his heart was racing, he felt weak and thought he was going to past out. Pt denies CP, headache, fevers, or chills. 72 yo male exsmoker with COPD, hypothyroidism, HLD, PTSD and hx of recent PE s/p 1 week of eliquis, came with complains of difficulty breathing. Pt states his SOB started a month ago and he has been admitted to the hospital 3 times in the past month. He states he has SOB with exertion, lying flat in bed, and wakes up gasping for air. He can walk up to 3 blocks before getting SOB. He says he has lower ext edema. 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 Pt was admitted to [facility] 3 weeks ago and was diagnosed with PE. He was discharged on 7 days of Eliquis and didn't follow up with a doctor about this. Pt doesn't remember how he was this morning but had an appointment with his PCP who heard about his recent complains and told him to seek immediate attention at the ED. He said he was feeling SOB, dizzy, blurry vision his heart was racing, he felt weak and thought he was going to past out. Pt has a dry cough, no hemoptysis, he has lost 40lbs, doesn't have anorexia denies CP, headache, fevers, or chills.

## 2019-08-02 NOTE — H&P ADULT - PROBLEM SELECTOR PLAN 8
IMPROVE VTE Individual Risk Assessment  RISK                                                                Points  [ x] Previous VTE                                                  3  [  ] Thrombophilia                                               2    [  ] Lower limb paralysis                                      2        (unable to hold up >15 seconds)      [  ] Current Cancer                                              2         (within 6 months)  [  ] Immobilization > 24 hrs                                1  [  ] ICU/CCU stay > 24 hours                              1    [ x ] Age > 60                                                      1    IMPROVE VTE Score 4  c/w eliquis

## 2019-08-02 NOTE — ED ADULT NURSE NOTE - PAIN RATING/NUMBER SCALE (0-10): REST
" Home Care Instructions                                                                                                                  Name:Satya Aguilar  Medical Record Number:[id number]  CSN: [id number]    YOB: 1954   Age: 62 y.o.  Sex: male  HT:1.854 m (6' 1\") WT: 94.1 kg (207 lb 7.3 oz)          Admit Date: 6/1/2017     Discharge Date:   Today's Date: 6/3/2017  Attending Doctor:  Mario Alberto Alejandre M.D.                  Allergies:  Review of patient's allergies indicates no known allergies.            Discharge Instructions       Discharge Instructions    Discharged to home by taxi with relative. Discharged via wheelchair, hospital escort: Yes.  Special equipment needed: Not Applicable    Be sure to schedule a follow-up appointment with your primary care doctor or any specialists as instructed.     Discharge Plan:   Diet Plan: Discussed  Activity Level: Discussed  Confirmed Follow up Appointment: Appointment Scheduled  Confirmed Symptoms Management: Discussed  Medication Reconciliation Updated: Yes  Influenza Vaccine Indication: Not indicated: Previously immunized this influenza season and > 8 years of age    I understand that a diet low in cholesterol, fat, and sodium is recommended for good health. Unless I have been given specific instructions below for another diet, I accept this instruction as my diet prescription.   Other diet:     Special Instructions: None    · Is patient discharged on Warfarin / Coumadin?   No     · Is patient Post Blood Transfusion?  No    Depression / Suicide Risk    As you are discharged from [facility], it is important to learn how to keep safe from harming yourself.    Recognize the warning signs:  · Abrupt changes in personality, positive or negative- including increase in energy   · Giving away possessions  · Change in eating patterns- significant weight changes-  positive or negative  · Change in sleeping patterns- unable to sleep or sleeping all the " time   · Unwillingness or inability to communicate  · Depression  · Unusual sadness, discouragement and loneliness  · Talk of wanting to die  · Neglect of personal appearance   · Rebelliousness- reckless behavior  · Withdrawal from people/activities they love  · Confusion- inability to concentrate     If you or a loved one observes any of these behaviors or has concerns about self-harm, here's what you can do:  · Talk about it- your feelings and reasons for harming yourself  · Remove any means that you might use to hurt yourself (examples: pills, rope, extension cords, firearm)  · Get professional help from the community (Mental Health, Substance Abuse, psychological counseling)  · Do not be alone:Call your Safe Contact- someone whom you trust who will be there for you.  · Call your local CRISIS HOTLINE 485-9060 or 590-264-9182  · Call your local Children's Mobile Crisis Response Team [place] (525) 529-1723 or www.[employer]  · Call the toll free National Suicide Prevention Hotlines   · National Suicide Prevention Lifeline 753-859-LAHS (0825)  · [place] Hope Line Network 800-SUICIDE (384-3830)      Syncope  Syncope is a medical term for fainting or passing out. This means you lose consciousness and drop to the ground. People are generally unconscious for less than 5 minutes. You may have some muscle twitches for up to 15 seconds before waking up and returning to normal. Syncope occurs more often in older adults, but it can happen to anyone. While most causes of syncope are not dangerous, syncope can be a sign of a serious medical problem. It is important to seek medical care.   CAUSES   Syncope is caused by a sudden drop in blood flow to the brain. The specific cause is often not determined. Factors that can bring on syncope include:  · Taking medicines that lower blood pressure.  · Sudden changes in posture, such as standing up quickly.  · Taking more medicine than prescribed.  · Standing in one place  for too long.  · Seizure disorders.  · Dehydration and excessive exposure to heat.  · Low blood sugar (hypoglycemia).  · Straining to have a bowel movement.  · Heart disease, irregular heartbeat, or other circulatory problems.  · Fear, emotional distress, seeing blood, or severe pain.  SYMPTOMS   Right before fainting, you may:  2. Feel dizzy or light-headed.  3. Feel nauseous.  4. See all white or all black in your field of vision.  5. Have cold, clammy skin.  DIAGNOSIS   Your health care provider will ask about your symptoms, perform a physical exam, and perform an electrocardiogram (ECG) to record the electrical activity of your heart. Your health care provider may also perform other heart or blood tests to determine the cause of your syncope which may include:  2. Transthoracic echocardiogram (TTE). During echocardiography, sound waves are used to evaluate how blood flows through your heart.  3. Transesophageal echocardiogram (SONIA).  4. Cardiac monitoring. This allows your health care provider to monitor your heart rate and rhythm in real time.  5. Holter monitor. This is a portable device that records your heartbeat and can help diagnose heart arrhythmias. It allows your health care provider to track your heart activity for several days, if needed.  6. Stress tests by exercise or by giving medicine that makes the heart beat faster.  TREATMENT   In most cases, no treatment is needed. Depending on the cause of your syncope, your health care provider may recommend changing or stopping some of your medicines.  HOME CARE INSTRUCTIONS  2. Have someone stay with you until you feel stable.  3. Do not drive, use machinery, or play sports until your health care provider says it is okay.  4. Keep all follow-up appointments as directed by your health care provider.  5. Lie down right away if you start feeling like you might faint. Breathe deeply and steadily. Wait until all the symptoms have passed.  6. Drink enough fluids  to keep your urine clear or pale yellow.  7. If you are taking blood pressure or heart medicine, get up slowly and take several minutes to sit and then stand. This can reduce dizziness.  SEEK IMMEDIATE MEDICAL CARE IF:   2. You have a severe headache.  3. You have unusual pain in the chest, abdomen, or back.  4. You are bleeding from your mouth or rectum, or you have black or tarry stool.  5. You have an irregular or very fast heartbeat.  6. You have pain with breathing.  7. You have repeated fainting or seizure-like jerking during an episode.  8. You faint when sitting or lying down.  9. You have confusion.  10. You have trouble walking.  11. You have severe weakness.  12. You have vision problems.  If you fainted, call your local emergency services (911 in [place] Do not drive yourself to the hospital.      This information is not intended to replace advice given to you by your health care provider. Make sure you discuss any questions you have with your health care provider.     Document Released: 12/18/2006 Document Revised: 05/03/2016 Document Reviewed: 02/15/2013  [employer] Interactive Patient Education ©2016 [employer]      Your appointments     Sep 13, 2017  3:15 PM   FOLLOW UP with Loyd Hou M.D.   [facility] (--)    [address] [place] [address] [address]  [facility] [id number] [phone]              Follow-up Information     1. Follow up with Mohsen Tamasaby, M.D. In 1 week.    Specialty:  Family Medicine    Contact information    [address]  [facility] [address]  [phone]           Discharge Medication Instructions:    Below are the medications your physician expects you to take upon discharge:    Review all your home medications and newly ordered medications with your doctor and/or pharmacist. Follow medication instructions as directed by your doctor and/or pharmacist.    Please keep your medication list with you and share with your physician.                Medication List      CONTINUE taking these medications        Instructions    Morning Afternoon Evening Bedtime    albuterol 108 (90 BASE) MCG/ACT Aers inhalation aerosol   Next Dose Due:  Take as needed.           Inhale 2 Puffs by mouth every 1 hour as needed.   Dose:  2 Puff                        amiodarone 200 MG Tabs   Last time this was given:  200 mg on 6/3/2017  9:39 AM   Commonly known as:  CORDARONE   Next Dose Due:  Take tomorrow morning. 6/4/2017        Take 1 Tab by mouth every day.   Dose:  200 mg                        aspirin 81 MG tablet   Next Dose Due:  Take tomorrow morning. 6/4/2017        Take 81 mg by mouth every day.   Dose:  81 mg                        gabapentin 300 MG Caps   Last time this was given:  300 mg on 6/3/2017  6:46 AM   Commonly known as:  NEURONTIN   Next Dose Due:  Take 2 more times today. 6/3/2017        Take 300 mg by mouth 3 times a day.   Dose:  300 mg                        gemfibrozil 600 MG Tabs   Last time this was given:  600 mg on 6/3/2017  9:39 AM   Commonly known as:  LOPID   Next Dose Due:  Take tonight. 6/3/2017        Take 600 mg by mouth 2 times a day.   Dose:  600 mg                        lovastatin 40 MG tablet   Last time this was given:  40 mg on 6/2/2017  8:25 PM   Commonly known as:  MEVACOR   Next Dose Due:  Take tonight. 6/3/2017        Take 1 Tab by mouth every evening.   Dose:  40 mg                        metformin 1000 MG tablet   Commonly known as:  GLUCOPHAGE   Next Dose Due:  Take tonight with dinner. 6/3/2017        Take 1,000 mg by mouth 2 times a day, with meals.   Dose:  1000 mg                        RISPERDAL 4 MG tablet   Last time this was given:  4 mg on 6/2/2017  9:52 PM   Generic drug:  risperidone   Next Dose Due:  Take tonight. 6/3/2017        Take 4 mg by mouth every evening.   Dose:  4 mg                        tiotropium 18 MCG Caps   Last time this was given:  1 Cap on 6/3/2017  9:39 AM   Commonly known as:  SPIRIVA    Next Dose Due:  Take tomorrow morning. 6/4/2017        Inhale 1 Cap by mouth every day.   Dose:  18 mcg                          STOP taking these medications     citalopram 10 MG tablet   Commonly known as:  CELEXA               glyBURIDE 2.5 MG Tabs   Commonly known as:  DIABETA               metoprolol 25 MG Tabs   Commonly known as:  LOPRESSOR               rivaroxaban 20 MG Tabs tablet   Commonly known as:  XARELTO                       Instructions           Diet / Nutrition:    Follow any diet instructions given to you by your doctor or the dietician, including how much salt (sodium) you are allowed each day.    If you are overweight, talk to your doctor about a weight reduction plan.    Activity:    Remain physically active following your doctor's instructions about exercise and activity.    Rest often.     Any time you become even a little tired or short of breath, SIT DOWN and rest.    Worsening Symptoms:    Report any of the following signs and symptoms to the doctor's office immediately:    *Pain of jaw, arm, or neck  *Chest pain not relieved by medication                               *Dizziness or loss of consciousness  *Difficulty breathing even when at rest   *More tired than usual                                       *Bleeding drainage or swelling of surgical site  *Swelling of feet, ankles, legs or stomach                 *Fever (>100ºF)  *Pink or blood tinged sputum  *Weight gain (3lbs/day or 5lbs /week)           *Shock from internal defibrillator (if applicable)  *Palpitations or irregular heartbeats                *Cool and/or numb extremities    Stroke Awareness    Common Risk Factors for Stroke include:    Age  Atrial Fibrillation  Carotid Artery Stenosis  Diabetes Mellitus  Excessive alcohol consumption  High blood pressure  Overweight   Physical inactivity  Smoking    Warning signs and symptoms of a stroke include:    *Sudden numbness or weakness of the face, arm or leg (especially on  one side of the body).  *Sudden confusion, trouble speaking or understanding.  *Sudden trouble seeing in one or both eyes.  *Sudden trouble walking, dizziness, loss of balance or coordination.Sudden severe headache with no known cause.    It is very important to get treatment quickly when a stroke occurs. If you experience any of the above warning signs, call 911 immediately.                   Disclaimer         Quit Smoking / Tobacco Use:    I understand the use of any tobacco products increases my chance of suffering from future heart disease or stroke and could cause other illnesses which may shorten my life. Quitting the use of tobacco products is the single most important thing I can do to improve my health. For further information on smoking / tobacco cessation call a Toll Free Quit Line at 1-646.751.2611 (*National Cancer [place]) or 1-569.215.2260 (American Lung Association) or you can access the web based program at www.lungusa.org.    [place] Tobacco Users Help Line:  (351) 585-1985       Toll Free: 1-393.641.8117  Quit Tobacco Program [facility] (337)872-0763    Crisis Hotline:    [place] Crisis Hotline:  8-400-HNQKDEE or 1-112.334.6357    [place] Crisis Hotline:    1-146.567.7779 or 782-885-6067    Discharge Survey:   [facility] you for choosing [facility]. We hope we did everything we could to make your hospital stay a pleasant one. You may be receiving a phone survey and we would appreciate your time and participation in answering the questions. Your input is very valuable to us in our efforts to improve our service to our patients and their families.        My signature on this form indicates that:    1. I have reviewed and understand the above information.  2. My questions regarding this information have been answered to my satisfaction.  3. I have formulated a plan with my discharge nurse to obtain my prescribed medications for home.                  Disclaimer          __________________________________                     __________       ________                       Patient Signature                                                 Date                    Time         0

## 2019-08-02 NOTE — ED PROVIDER NOTE - PROGRESS NOTE DETAILS
Nemes - CTA neg, evaluated at bedside, comfortable, in NAD. Will admit to Dr Aaron for COPD exacerbation, further dyspnea w/u. MAR aware

## 2019-08-02 NOTE — H&P ADULT - NSICDXPASTMEDICALHX_GEN_ALL_CORE_FT
PAST MEDICAL HISTORY:  Anxiety     Chronic GERD     COPD with asthma     History of pulmonary embolus (PE)     HLD (hyperlipidemia)     Hypothyroidism     Post traumatic stress disorder (PTSD)

## 2019-08-02 NOTE — H&P ADULT - NSHPSOCIALHISTORY_GEN_ALL_CORE
Ex smoker- PT used to smoke 3PPD for 10 years. 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

## 2019-08-02 NOTE — H&P ADULT - PROBLEM SELECTOR PLAN 5
pt on citalopram, trazodone, bu proprion, clnazepam  - continue home meds pt on citalopram, trazodone, buproprion, clonazepam  - continue home meds - pt on citalopram, trazodone, buproprion, clonazepam  - continue home meds

## 2019-08-02 NOTE — H&P ADULT - PROBLEM SELECTOR PLAN 3
hgb 12.9 - hgb 12.9  - f/u cbc am hx of PTSD  Pt on 4 psych medications  need psych consult hx of PTSD  Pt on 4 psych medications  need psych consult- Dr. Bryant consulted

## 2019-08-02 NOTE — H&P ADULT - PROBLEM SELECTOR PLAN 4
pt on citalopram, trazodone, bu proprion, clnazepam  - continue home meds pt on citalopram, trazodone, buproprion, clonazepam  - continue home meds - hgb 12.9  - f/u cbc am

## 2019-08-02 NOTE — H&P ADULT - PROBLEM SELECTOR PLAN 2
- At home on fluticasone nasal spray  - hx of smoking - At home on fluticasone nasal spray  - hx of smoking  - ABG shows alkalosis  - give NC 2L if SO2 <92% CHF exacerbation vs COPD exacerbation  CTA negative for PE  get an echo?  pt not on any anticoagulations  BNP elevated 181  ABG shows alkalosis  CK and CKMB elevated COPD exacerbation vs Acute CHF vs panic attack (as pt has PTSD) or from PE it self as pt was recently diagnosed with PE  pt was not taking eliquis at home as he did not have medications   today CTA negative for PE  will get echo for dyspnea/ orthopnea   BNP elevated 181 mildly elevated   cardio Dr. Brumfield

## 2019-08-02 NOTE — H&P ADULT - NSHPPHYSICALEXAM_GEN_ALL_CORE
· CONSTITUTIONAL: Well appearing, well nourished, awake, alert, oriented to person, place, time/situation and in no apparent distress.  · ENMT: Airway patent, Nasal mucosa clear. Mouth with normal mucosa. Throat has no vesicles, no oropharyngeal exudates and uvula is midline.  · EYES: Clear bilaterally, pupils equal, round and reactive to light.  · CARDIAC: Normal rate, regular rhythm.  Heart sounds S1, S2.  No murmurs, rubs or gallops.  · RESPIRATORY: clear breath sounds bilaterally. No wheezing noted  · GASTROINTESTINAL: Abdomen soft, mild tenderness with deep palpation, no guarding.  · MUSCULOSKELETAL: No pain. 1+ lower extremity edema, no calf tenderness  · NEUROLOGICAL: Alert and oriented, no focal deficits, no motor or sensory deficits.  · SKIN: Skin normal color for race, warm, dry and intact. No evidence of rash. surgical scar on chest  · PSYCHIATRIC: Alert and oriented to person, place, time/situation. normal mood and affect. no apparent risk to self or others.  · HEME LYMPH: No adenopathy or splenomegaly. No cervical or inguinal lymphadenopathy. Vital Signs Last 24 Hrs  T(C): 37.1 (02 Aug 2019 15:38), Max: 37.1 (02 Aug 2019 15:38)  T(F): 98.7 (02 Aug 2019 15:38), Max: 98.7 (02 Aug 2019 15:38)  HR: 52 (02 Aug 2019 15:38) (52 - 59)  BP: 125/64 (02 Aug 2019 15:38) (123/74 - 125/64)  BP(mean): --  RR: 20 (02 Aug 2019 15:38) (20 - 22)  SpO2: 100% (02 Aug 2019 15:38) (97% - 100%)    · CONSTITUTIONAL: Well appearing, well nourished, awake, alert, oriented to person, place, time/situation and in no apparent distress.  · ENMT: Airway patent, Nasal mucosa clear. Mouth with normal mucosa. Throat has no vesicles, no oropharyngeal exudates and uvula is midline.  · EYES: Clear bilaterally, pupils equal, round and reactive to light.  · CARDIAC: Normal rate, regular rhythm.  Heart sounds S1, S2.  No murmurs, rubs or gallops.  · RESPIRATORY: clear breath sounds bilaterally. No wheezing noted  · GASTROINTESTINAL: Abdomen soft, mild tenderness with deep palpation, no guarding.  · MUSCULOSKELETAL: No pain. 1+ lower extremity edema, no calf tenderness  · NEUROLOGICAL: Alert and oriented, no focal deficits, no motor or sensory deficits.  · SKIN: Skin normal color for race, warm, dry and intact. No evidence of rash. surgical scar on chest  · PSYCHIATRIC: Alert and oriented to person, place, time/situation. normal mood and affect. no apparent risk to self or others.  · HEME LYMPH: No adenopathy or splenomegaly. No cervical or inguinal lymphadenopathy.

## 2019-08-02 NOTE — H&P ADULT - PROBLEM SELECTOR PLAN 6
continue simvastatin 40 mg pt on citalopram, trazodone, buproprion, clonazepam  - continue home meds

## 2019-08-02 NOTE — ED ADULT NURSE NOTE - ED STAT RN HANDOFF DETAILS
Pt remains stable, AOX4, no s/s of any distress noted. IV line in place, no signs of infiltration noted. VS WNL. Call bell in reach, bed in lowest position. Safety maintained, hourly rounding performed. Endorsed to nurse Paez.

## 2019-08-02 NOTE — H&P ADULT - PROBLEM SELECTOR PLAN 1
CHF exacerbation vs COPD exacerbation  CTA negative for PE  pt not on any anticoagulations  BNP elevated 181  ABG shows alkalosis CHF exacerbation vs COPD exacerbation  CTA negative for PE  pt not on any anticoagulations  BNP elevated 181  ABG shows alkalosis  CK and CKMB elevated - At home on fluticasone nasal spray  - start on bronchodilators q6h  - hx of smoking  - ABG shows alkalosis  - give NC 2L if SO2 <92%  - pulmonary consult - At home on fluticasone nasal spray  - start on bronchodilators and zithromax for copd exacerbation vs  bronchitis   - hx of smoking  - ABG shows alkalosis  - give NC 2L if SO2 <92%  - pulmonary consulted- Dr. Thrasher

## 2019-08-02 NOTE — ED ADULT NURSE NOTE - OBJECTIVE STATEMENT
Pt AOx4, ambulatory, h/o PE, sent from PCP for evaluation, Pt c/o of SOB. Denies CP, n/v/f. Pt endorses having lungs problems since 9/11, where he was a . No distress noted.

## 2019-08-03 DIAGNOSIS — F41.1 GENERALIZED ANXIETY DISORDER: ICD-10-CM

## 2019-08-03 DIAGNOSIS — J44.1 CHRONIC OBSTRUCTIVE PULMONARY DISEASE WITH (ACUTE) EXACERBATION: ICD-10-CM

## 2019-08-03 LAB
24R-OH-CALCIDIOL SERPL-MCNC: 30.8 NG/ML — SIGNIFICANT CHANGE UP (ref 30–80)
ANION GAP SERPL CALC-SCNC: 5 MMOL/L — SIGNIFICANT CHANGE UP (ref 5–17)
BASOPHILS # BLD AUTO: 0.05 K/UL — SIGNIFICANT CHANGE UP (ref 0–0.2)
BASOPHILS NFR BLD AUTO: 0.6 % — SIGNIFICANT CHANGE UP (ref 0–2)
BUN SERPL-MCNC: 19 MG/DL — HIGH (ref 7–18)
CALCIUM SERPL-MCNC: 7.2 MG/DL — LOW (ref 8.4–10.5)
CHLORIDE SERPL-SCNC: 113 MMOL/L — HIGH (ref 96–108)
CHOLEST SERPL-MCNC: 121 MG/DL — SIGNIFICANT CHANGE UP (ref 10–199)
CO2 SERPL-SCNC: 25 MMOL/L — SIGNIFICANT CHANGE UP (ref 22–31)
CREAT SERPL-MCNC: 1.15 MG/DL — SIGNIFICANT CHANGE UP (ref 0.5–1.3)
EOSINOPHIL # BLD AUTO: 0.28 K/UL — SIGNIFICANT CHANGE UP (ref 0–0.5)
EOSINOPHIL NFR BLD AUTO: 3.2 % — SIGNIFICANT CHANGE UP (ref 0–6)
FOLATE SERPL-MCNC: 17.8 NG/ML — SIGNIFICANT CHANGE UP
GLUCOSE SERPL-MCNC: 95 MG/DL — SIGNIFICANT CHANGE UP (ref 70–99)
HCT VFR BLD CALC: 40.4 % — SIGNIFICANT CHANGE UP (ref 39–50)
HCV AB S/CO SERPL IA: 0.08 S/CO — SIGNIFICANT CHANGE UP (ref 0–0.99)
HCV AB SERPL-IMP: SIGNIFICANT CHANGE UP
HDLC SERPL-MCNC: 29 MG/DL — LOW
HGB BLD-MCNC: 13.1 G/DL — SIGNIFICANT CHANGE UP (ref 13–17)
IMM GRANULOCYTES NFR BLD AUTO: 0.3 % — SIGNIFICANT CHANGE UP (ref 0–1.5)
LIPID PNL WITH DIRECT LDL SERPL: 59 MG/DL — SIGNIFICANT CHANGE UP
LYMPHOCYTES # BLD AUTO: 3.29 K/UL — SIGNIFICANT CHANGE UP (ref 1–3.3)
LYMPHOCYTES # BLD AUTO: 37.8 % — SIGNIFICANT CHANGE UP (ref 13–44)
MAGNESIUM SERPL-MCNC: 2.1 MG/DL — SIGNIFICANT CHANGE UP (ref 1.6–2.6)
MCHC RBC-ENTMCNC: 30.6 PG — SIGNIFICANT CHANGE UP (ref 27–34)
MCHC RBC-ENTMCNC: 32.4 GM/DL — SIGNIFICANT CHANGE UP (ref 32–36)
MCV RBC AUTO: 94.4 FL — SIGNIFICANT CHANGE UP (ref 80–100)
MONOCYTES # BLD AUTO: 0.76 K/UL — SIGNIFICANT CHANGE UP (ref 0–0.9)
MONOCYTES NFR BLD AUTO: 8.7 % — SIGNIFICANT CHANGE UP (ref 2–14)
NEUTROPHILS # BLD AUTO: 4.29 K/UL — SIGNIFICANT CHANGE UP (ref 1.8–7.4)
NEUTROPHILS NFR BLD AUTO: 49.4 % — SIGNIFICANT CHANGE UP (ref 43–77)
NRBC # BLD: 0 /100 WBCS — SIGNIFICANT CHANGE UP (ref 0–0)
PHOSPHATE SERPL-MCNC: 2.6 MG/DL — SIGNIFICANT CHANGE UP (ref 2.5–4.5)
PLATELET # BLD AUTO: 147 K/UL — LOW (ref 150–400)
POTASSIUM SERPL-MCNC: 4.1 MMOL/L — SIGNIFICANT CHANGE UP (ref 3.5–5.3)
POTASSIUM SERPL-SCNC: 4.1 MMOL/L — SIGNIFICANT CHANGE UP (ref 3.5–5.3)
RBC # BLD: 4.28 M/UL — SIGNIFICANT CHANGE UP (ref 4.2–5.8)
RBC # FLD: 14.5 % — SIGNIFICANT CHANGE UP (ref 10.3–14.5)
SODIUM SERPL-SCNC: 143 MMOL/L — SIGNIFICANT CHANGE UP (ref 135–145)
TOTAL CHOLESTEROL/HDL RATIO MEASUREMENT: 4.2 RATIO — SIGNIFICANT CHANGE UP (ref 3.4–9.6)
TRIGL SERPL-MCNC: 165 MG/DL — HIGH (ref 10–149)
TSH SERPL-MCNC: 10.1 UU/ML — HIGH (ref 0.34–4.82)
VIT B12 SERPL-MCNC: 281 PG/ML — SIGNIFICANT CHANGE UP (ref 232–1245)
WBC # BLD: 8.7 K/UL — SIGNIFICANT CHANGE UP (ref 3.8–10.5)
WBC # FLD AUTO: 8.7 K/UL — SIGNIFICANT CHANGE UP (ref 3.8–10.5)

## 2019-08-03 PROCEDURE — 99232 SBSQ HOSP IP/OBS MODERATE 35: CPT

## 2019-08-03 RX ADMIN — APIXABAN 5 MILLIGRAM(S): 2.5 TABLET, FILM COATED ORAL at 05:50

## 2019-08-03 RX ADMIN — Medication 0.25 MILLIGRAM(S): at 12:52

## 2019-08-03 RX ADMIN — SIMVASTATIN 40 MILLIGRAM(S): 20 TABLET, FILM COATED ORAL at 21:31

## 2019-08-03 RX ADMIN — AZITHROMYCIN 250 MILLIGRAM(S): 500 TABLET, FILM COATED ORAL at 21:32

## 2019-08-03 RX ADMIN — APIXABAN 5 MILLIGRAM(S): 2.5 TABLET, FILM COATED ORAL at 17:37

## 2019-08-03 RX ADMIN — Medication 150 MILLIGRAM(S): at 12:54

## 2019-08-03 RX ADMIN — Medication 3 MILLILITER(S): at 15:39

## 2019-08-03 RX ADMIN — CITALOPRAM 20 MILLIGRAM(S): 10 TABLET, FILM COATED ORAL at 12:52

## 2019-08-03 RX ADMIN — Medication 3 MILLILITER(S): at 20:12

## 2019-08-03 RX ADMIN — Medication 175 MICROGRAM(S): at 05:54

## 2019-08-03 RX ADMIN — BUPROPION HYDROCHLORIDE 300 MILLIGRAM(S): 150 TABLET, EXTENDED RELEASE ORAL at 12:52

## 2019-08-03 NOTE — CONSULT NOTE ADULT - SUBJECTIVE AND OBJECTIVE BOX
PULMONARY CONSULT NOTE      PUNEET MENDOZA  MRN-[id number]    Patient is a 73y old  Male who presents with a chief complaint of Shortness of breath. (02 Aug 2019 18:58)     History of Present Illness:  Reason for Admission: Shortness of breath.	  History of Present Illness: 	  74 yo male exsmoker with COPD, hypothyroidism, HLD, PTSD and hx of recent PE s/p 1 week of eliquis, came with complains of difficulty breathing. Pt states his SOB started a month ago and he has been admitted to the hospital 3 times in the past month. He states he has SOB with exertion, lying flat in bed, and wakes up gasping for air. He can walk up to 3 blocks before getting SOB. He says he has lower ext edema. 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 Pt was admitted to [facility] 3 weeks ago and was diagnosed with PE. He was discharged on 7 days of Eliquis and didn't follow up with a doctor about this. Pt doesn't remember how he was this morning but had an appointment with his PCP who heard about his recent complains and told him to seek immediate attention at the ED. He said he was feeling SOB, dizzy, blurry vision his heart was racing, he felt weak and thought he was going to past out. Pt has a dry cough, no hemoptysis, he has lost 40lbs, doesn't have anorexia denies CP, headache, fevers, or chills.    HISTORY OF PRESENT ILLNESS: As above , awake , alert , lying in bed. C/o SOB and Patel.    MEDICATIONS  (STANDING):  ALBUTerol/ipratropium for Nebulization 3 milliLiter(s) Nebulizer every 6 hours  apixaban 5 milliGRAM(s) Oral every 12 hours  azithromycin  IVPB 250 milliGRAM(s) IV Intermittent every 24 hours  azithromycin  IVPB      buDESOnide 160 MICROgram(s)/formoterol 4.5 MICROgram(s) Inhaler 2 Puff(s) Inhalation two times a day  buPROPion XL . 300 milliGRAM(s) Oral daily  citalopram 20 milliGRAM(s) Oral daily  clonazePAM  Tablet 0.25 milliGRAM(s) Oral daily  levothyroxine 175 MICROGram(s) Oral daily  simvastatin 40 milliGRAM(s) Oral at bedtime  tiotropium 18 MICROgram(s) Capsule 1 Capsule(s) Inhalation daily  traZODone 150 milliGRAM(s) Oral daily      MEDICATIONS  (PRN):      Allergies    Allergy Status Unknown    Intolerances        PAST MEDICAL & SURGICAL HISTORY:  Chronic GERD  COPD with asthma  Post traumatic stress disorder (PTSD)  HLD (hyperlipidemia)  History of pulmonary embolus (PE)  Hypothyroidism  Anxiety  S/P thyroidectomy      FAMILY HISTORY:  FH: Alzheimers disease      SOCIAL HISTORY  Smoking History:     REVIEW OF SYSTEMS:    CONSTITUTIONAL:  No fevers, chills, sweats    HEENT:  Eyes:  No diplopia or blurred vision. ENT:  No earache, sore throat or runny nose.    CARDIOVASCULAR:  No pressure, squeezing, tightness, or heaviness about the chest; no palpitations.    RESPIRATORY:  Per HPI    GASTROINTESTINAL:  No abdominal pain, nausea, vomiting or diarrhea.    GENITOURINARY:  No dysuria, frequency or urgency.    NEUROLOGIC:  No paresthesias, fasciculations, seizures or weakness.    PSYCHIATRIC:  No disorder of thought or mood.    Vital Signs Last 24 Hrs  T(C): 36.8 (03 Aug 2019 05:14), Max: 37.1 (02 Aug 2019 15:38)  T(F): 98.2 (03 Aug 2019 05:14), Max: 98.7 (02 Aug 2019 15:38)  HR: 53 (03 Aug 2019 05:14) (52 - 59)  BP: 115/59 (03 Aug 2019 05:14) (106/59 - 125/64)  BP(mean): --  RR: 16 (03 Aug 2019 05:14) (16 - 22)  SpO2: 93% (03 Aug 2019 05:14) (93% - 100%)  I&O's Detail      PHYSICAL EXAMINATION:    GENERAL: The patient is a well-developed, well-nourished _____in no apparent distress.     HEENT: Head is normocephalic and atraumatic. Extraocular muscles are intact. Mucous membranes are moist.     NECK: Supple.     LUNGS: Clear to auscultation without wheezing, rales, or rhonchi. Respirations unlabored    HEART: Regular rate and rhythm without murmur.    ABDOMEN: Soft, nontender, and nondistended.  No hepatosplenomegaly is noted.    EXTREMITIES: Without any cyanosis, clubbing, rash, lesions or edema.    NEUROLOGIC: Grossly intact.      LABS:                        13.1   8.70  )-----------( 147      ( 03 Aug 2019 05:58 )             40.4     08-03    143  |  113<H>  |  19<H>  ----------------------------<  95  4.1   |  25  |  1.15    Ca    7.2<L>      03 Aug 2019 05:58  Phos  2.6     08-03  Mg     2.1     08-03    TPro  6.2  /  Alb  3.2<L>  /  TBili  0.2  /  DBili  x   /  AST  30  /  ALT  34  /  AlkPhos  53  08-02    PT/INR - ( 02 Aug 2019 14:29 )   PT: 12.9 sec;   INR: 1.16 ratio         PTT - ( 02 Aug 2019 14:29 )  PTT:32.0 sec      CARDIAC MARKERS ( 02 Aug 2019 14:29 )  <0.015 ng/mL / x     / 242 U/L / x     / 4.5 ng/mL        Serum Pro-Brain Natriuretic Peptide: 185 pg/mL (08-02-19 @ 14:29)          MICROBIOLOGY:    RADIOLOGY & ADDITIONAL STUDIES:    CXR:    Ct scan chest:  < from: CT Angio Chest w/ IV Cont (08.02.19 @ 15:53) >  IMPRESSION:   No evidence of pulmonary embolism.  Dilated ascending aorta measuring 4.1 cm.   Coronary arterial calcifications.    < end of copied text >    ekg;    echo:

## 2019-08-03 NOTE — BEHAVIORAL HEALTH ASSESSMENT NOTE - NSBHSUICPROTECTFACT_PSY_A_CORE
Future oriented/Ability to cope with stress/Supportive social network or family/Identifies reasons for living/Responsibility to family and others/Positive therapeutic relationships

## 2019-08-03 NOTE — PROGRESS NOTE ADULT - SUBJECTIVE AND OBJECTIVE BOX
History of Present Illness:   74 yo male exsmoker with COPD, hypothyroidism, HLD, PTSD and hx of recent PE s/p 1 week of eliquis, came with complains of difficulty breathing. Pt states his SOB started a month ago and he has been admitted to the hospital 3 times in the past month. He states he has SOB with exertion, lying flat in bed, and wakes up gasping for air. He can walk up to 3 blocks before getting SOB. He says he has lower ext edema. 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 Pt was admitted to [facility] 3 weeks ago and was diagnosed with PE. He was discharged on 7 days of Eliquis and didn't follow up with a doctor about this. Pt doesn't remember how he was this morning but had an appointment with his PCP who heard about his recent complains and told him to seek immediate attention at the ED. He said he was feeling SOB, dizzy, blurry vision his heart was racing, he felt weak and thought he was going to past out. Pt has a dry cough, no hemoptysis, he has lost 40lbs, doesn't have anorexia denies CP, headache, fevers, or chills.    pt seen in icu [  ], reg med floor [  x ], bed [x  ], chair at bedside [   ], a+o x3 [ x ], lethargic [  ],  nad [x]            Allergies    Allergy Status Unknown        Vitals    T(F): 98.2 (08-03-19 @ 05:14), Max: 98.7 (08-02-19 @ 15:38)  HR: 53 (08-03-19 @ 05:14) (52 - 59)  BP: 115/59 (08-03-19 @ 05:14) (106/59 - 125/64)  RR: 16 (08-03-19 @ 05:14) (16 - 22)  SpO2: 93% (08-03-19 @ 05:14) (93% - 100%)  Wt(kg): --  CAPILLARY BLOOD GLUCOSE          Labs                          13.1   8.70  )-----------( 147      ( 03 Aug 2019 05:58 )             40.4       08-03    143  |  113<H>  |  19<H>  ----------------------------<  95  4.1   |  25  |  1.15    Ca    7.2<L>      03 Aug 2019 05:58  Phos  2.6     08-03  Mg     2.1     08-03    TPro  6.2  /  Alb  3.2<L>  /  TBili  0.2  /  DBili  x   /  AST  30  /  ALT  34  /  AlkPhos  53  08-02      CARDIAC MARKERS ( 02 Aug 2019 14:29 )  <0.015 ng/mL / x     / 242 U/L / x     / 4.5 ng/mL            Radiology Results    < from: CT Angio Chest w/ IV Cont (08.02.19 @ 15:53) >  IMPRESSION:   No evidence of pulmonary embolism.  Dilated ascending aorta measuring 4.1 cm.   Coronary arterial calcifications.    < end of copied text >            Meds    MEDICATIONS  (STANDING):  ALBUTerol/ipratropium for Nebulization 3 milliLiter(s) Nebulizer every 6 hours  apixaban 5 milliGRAM(s) Oral every 12 hours  azithromycin  IVPB 250 milliGRAM(s) IV Intermittent every 24 hours  azithromycin  IVPB      buDESOnide 160 MICROgram(s)/formoterol 4.5 MICROgram(s) Inhaler 2 Puff(s) Inhalation two times a day  buPROPion XL . 300 milliGRAM(s) Oral daily  citalopram 20 milliGRAM(s) Oral daily  clonazePAM  Tablet 0.25 milliGRAM(s) Oral daily  levothyroxine 175 MICROGram(s) Oral daily  simvastatin 40 milliGRAM(s) Oral at bedtime  tiotropium 18 MICROgram(s) Capsule 1 Capsule(s) Inhalation daily  traZODone 150 milliGRAM(s) Oral daily      MEDICATIONS  (PRN):      Physical Exam    Neuro :  no focal deficits  Respiratory: CTA B/L  CV: RRR, S1S2, no murmurs,   Abdominal: Soft, NT, ND +BS,  Extremities: No edema, + peripheral pulses      ASSESSMENT    Chronic obstructive pulmonary disease with acute exacerbation  dyspnea/orthopnea   PE r/o  anxiety   h/o Chronic GERD  COPD with asthma  Post traumatic stress disorder (PTSD)  HLD (hyperlipidemia)  Hypothyroidism  S/P thyroidectomy        PLAN    f/u cxr   cta -ve for pe noted above  pulm cons  continue abx, bd, steriods  f/u echo  cardio cons   psych cons  TSH elevated  f/u endo cons   cont current meds

## 2019-08-03 NOTE — BEHAVIORAL HEALTH ASSESSMENT NOTE - SUMMARY
74 yo male exsmoker with COPD, hypothyroidism, HLD, PTSD and hx of recent PE s/p 1 week of eliquis, presented to the ED with SOB and was found to be tachypneic . He c/o orthopnea, PND. CTA was done to r/o PE as his hx was significant for recent PE and noncompliance with medication. Pt was admitted for dyspnea to r/o CHF exacerbation vs COPD exacerbation.   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 pt started psych treatment in 2007 when he was fired from work. pt was going to  [facility] and was in group therapy, individual and medication management.  pt took bupropion, trazodone, effexor and clonazepam. later on effexor was changed to celexa.   current medication regimen: bupropion  mg daily, Trazodone 150 mg hs, Citalopram 20 mg daily and Clonazepam 0.25 hs.   pt has h/o ETOH abuse with one inpt psych in context of ETOH abuse and sxs of psychosis for 3 weeks, 10 years ago. pt has been sober for 10 years. pt is active in AA, goes daily and sponsors other people.   no SA.   denies depressive sxs. reports constant worries about several things all the time.   has flashbacks, hypervigilance, avoidance. insomnia. good appetite. no SI/HI. forgetful.    pt with PTSD, CHAPIS and worsening anxiety in context of past history and current medical problems.   pt has outpt psych but wants to look for one that will see him more often.  continue current psych meds.   take clonazepam 0.25 mg bid for now due to increase anxiety.  continue weekly psychotherapy.  physical sxs due to medical problems can exacerbate anxiety. treat underlying disease.   thanks for consult.

## 2019-08-03 NOTE — CONSULT NOTE ADULT - SUBJECTIVE AND OBJECTIVE BOX
CHIEF COMPLAINT:Patient is a 73y old  Male who presents with a chief complaint of Shortness of breath.      HPI:  72 yo male exsmoker with COPD, hypothyroidism, HLD, PTSD and hx of recent PE s/p 1 week of eliquis, came with complains of difficulty breathing. Pt states his SOB started a month ago and he has been admitted to the hospital 3 times in the past month. He states he has SOB with exertion, lying flat in bed, and wakes up gasping for air. He can walk up to 3 blocks before getting SOB. He says he has lower ext edema. 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 Pt was admitted to [facility] 3 weeks ago and was diagnosed with PE. He was discharged on 7 days of Eliquis and didn't follow up with a doctor about this. Pt doesn't remember how he was this morning but had an appointment with his PCP who heard about his recent complains and told him to seek immediate attention at the ED. He said he was feeling SOB, dizzy, blurry vision his heart was racing, he felt weak and thought he was going to past out. Pt has a dry cough, no hemoptysis, he has lost 40lbs, doesn't have anorexia denies CP, headache, fevers, or chills. (02 Aug 2019 18:58)      PAST MEDICAL & SURGICAL HISTORY:  Chronic GERD  COPD with asthma  Post traumatic stress disorder (PTSD)  HLD (hyperlipidemia)  History of pulmonary embolus (PE)  Hypothyroidism  Anxiety  S/P thyroidectomy      MEDICATIONS  (STANDING):  ALBUTerol/ipratropium for Nebulization 3 milliLiter(s) Nebulizer every 6 hours  apixaban 5 milliGRAM(s) Oral every 12 hours  azithromycin  IVPB 250 milliGRAM(s) IV Intermittent every 24 hours  azithromycin  IVPB      buDESOnide 160 MICROgram(s)/formoterol 4.5 MICROgram(s) Inhaler 2 Puff(s) Inhalation two times a day  buPROPion XL . 300 milliGRAM(s) Oral daily  citalopram 20 milliGRAM(s) Oral daily  clonazePAM  Tablet 0.25 milliGRAM(s) Oral daily  levothyroxine 175 MICROGram(s) Oral daily  simvastatin 40 milliGRAM(s) Oral at bedtime  tiotropium 18 MICROgram(s) Capsule 1 Capsule(s) Inhalation daily  traZODone 150 milliGRAM(s) Oral daily    MEDICATIONS  (PRN):      FAMILY HISTORY:  FH: Alzheimers disease  No hx of CAD.    SOCIAL HISTORY:    [x ] Ex-smoker    [x ] Alcohol    Allergies    Allergy Status Unknown    Intolerances    	    REVIEW OF SYSTEMS:  CONSTITUTIONAL: No fever, weight loss, or fatigue  EYES: No eye pain, visual disturbances, or discharge  ENT:  No difficulty hearing, tinnitus, vertigo; No sinus or throat pain  NECK: No pain or stiffness  RESPIRATORY: No cough, wheezing, chills or hemoptysis; + Shortness of Breath  CARDIOVASCULAR: No chest pain, palpitations, passing out, dizziness, or leg swelling  GASTROINTESTINAL: No abdominal or epigastric pain. No nausea, vomiting, or hematemesis; No diarrhea or constipation. No melena or hematochezia.  GENITOURINARY: No dysuria, frequency, hematuria, or incontinence  NEUROLOGICAL: No headaches, memory loss, loss of strength, numbness, or tremors  SKIN: No itching, burning, rashes, or lesions   LYMPH Nodes: No enlarged glands  ENDOCRINE: No heat or cold intolerance; No hair loss  MUSCULOSKELETAL: No joint pain or swelling; No muscle, back, or extremity pain  PSYCHIATRIC: No depression, anxiety, mood swings, or difficulty sleeping  HEME/LYMPH: No easy bruising, or bleeding gums  ALLERGY AND IMMUNOLOGIC: No hives or eczema	      PHYSICAL EXAM:  T(C): 36.8 (08-03-19 @ 05:14), Max: 37.1 (08-02-19 @ 15:38)  HR: 53 (08-03-19 @ 05:14) (52 - 59)  BP: 115/59 (08-03-19 @ 05:14) (106/59 - 125/64)  RR: 16 (08-03-19 @ 05:14) (16 - 22)  SpO2: 93% (08-03-19 @ 05:14) (93% - 100%)      Appearance: Normal	  HEENT:   Normal oral mucosa, PERRL, EOMI	  Lymphatic: No lymphadenopathy  Cardiovascular: Normal S1 S2, No JVD, No murmurs, No edema  Respiratory: B/L [facility]	  Psychiatry: A & O x 3, Mood & affect appropriate  Gastrointestinal:  Soft, Non-tender, + BS	  Skin: No rashes, No ecchymoses, No cyanosis	  Neurologic: Non-focal  Extremities: Normal range of motion, No clubbing, cyanosis or edema  Vascular: Peripheral pulses palpable 2+ bilaterally       ECG:  Sinus bradycardia,LAD  	  LABS:	 	      CARDIAC MARKERS ( 02 Aug 2019 14:29 )  <0.015 ng/mL / x     / 242 U/L / x     / 4.5 ng/mL                              13.1   8.70  )-----------( 147      ( 03 Aug 2019 05:58 )             40.4     08-03    143  |  113<H>  |  19<H>  ----------------------------<  95  4.1   |  25  |  1.15    Ca    7.2<L>      03 Aug 2019 05:58  Phos  2.6     08-03  Mg     2.1     08-03    TPro  6.2  /  Alb  3.2<L>  /  TBili  0.2  /  DBili  x   /  AST  30  /  ALT  34  /  AlkPhos  53  08-02    proBNP: Serum Pro-Brain Natriuretic Peptide: 185 pg/mL (08-02 @ 14:29)    Lipid Profile: Cholesterol 121  LDL 59  HDL 29        TSH: Thyroid Stimulating Hormone, Serum: 10.10 uU/mL (08-03 @ 05:58)      EXAM:  CT ANGIO CHEST (W)AW IC                            PROCEDURE DATE:  08/02/2019          INTERPRETATION:  CLINICAL INFORMATION: Shortness of breath, off eliquis   for past 2 weeks.    COMPARISON: Chest radiograph 7/26/2019    PROCEDURE:   CT Angiography of the Chest.  90 ml of Omnipaque 350 was injected intravenously. 10 ml were discarded.  Sagittal and coronal reformats were performed as well as 3D (MIP)   reconstructions.      FINDINGS:    LUNGS AND AIRWAYS: Patent central airways.  Lungs are clear.    PLEURA: No pleural effusion.    MEDIASTINUM AND NELA: No lymphadenopathy.    VESSELS: No evidence of pulmonary embolism. Dilated ascending aorta   measuring 4.1 cm. Coronary arterial calcifications.    HEART: Heart size is normal. No pericardial effusion.    CHEST WALL AND LOWER NECK: Within normal limits.    VISUALIZED UPPER ABDOMEN: Within normal limits.    BONES: Degenerative changes in the spine.    IMPRESSION:   No evidence of pulmonary embolism.  Dilated ascending aorta measuring 4.1 cm.   Coronary arterial calcifications.

## 2019-08-03 NOTE — BEHAVIORAL HEALTH ASSESSMENT NOTE - NSBHCHARTREVIEWINVESTIGATE_PSY_A_CORE FT
Ventricular Rate 56 BPM    Atrial Rate 56 BPM    P-R Interval 240 ms    QRS Duration 104 ms    Q-T Interval 464 ms    QTC Calculation(Bezet) 447 ms    R Axis -50 degrees    T Axis 22 degrees    Diagnosis Line Sinus bradycardia with 1st degree A-V block  Left anterior fascicular block  Abnormal ECG

## 2019-08-03 NOTE — BEHAVIORAL HEALTH ASSESSMENT NOTE - HPI (INCLUDE ILLNESS QUALITY, SEVERITY, DURATION, TIMING, CONTEXT, MODIFYING FACTORS, ASSOCIATED SIGNS AND SYMPTOMS)
74 yo male exsmoker with COPD, hypothyroidism, HLD, PTSD and hx of recent PE s/p 1 week of eliquis, presented to the ED with SOB and was found to be tachypneic . He c/o orthopnea, PND. CTA was done to r/o PE as his hx was significant for recent PE and noncompliance with medication. Pt was admitted for dyspnea to r/o CHF exacerbation vs COPD exacerbation.   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 pt started psych treatment in 2007 when he was fired from work. pt was going to  [facility] and was in group therapy, individual and medication management.  pt took bupropion, trazodone, effexor and clonazepam. later on effexor was changed to celexa.   current medication regimen: bupropion  mg daily, Trazodone 150 mg hs, Citalopram 20 mg daily and Clonazepam 0.25 hs.   pt lives alone and has good friends and GF of 50 years. conflicts with GF because she accuses him of cheating. pt has children from previous relationship.   pt has h/o ETOH abuse with one inpt psych in context of ETOH abuse and sxs of psychosis for 3 weeks, 10 years ago. pt has been sober for 10 years. pt is active in AA, goes daily and sponsors other people.   no SA.   denies depressive sxs. reports constant worries about several things all the time.   has flashbacks, hypervigilance, avoidance. insomnia. good appetite. no SI/HI. forgetful.  he is an active person.   reports anxiety manifested as feeling jumpy at night. sob, palpitations. lasts days.   has outpt psych who he sees every 3 months and weekly therapist.   no h/o drug abuse. used to smoke tobacco and quit in 1971.   family history: 2 brothers with mental illness.

## 2019-08-03 NOTE — CONSULT NOTE ADULT - PROBLEM SELECTOR RECOMMENDATION 9
Bronchodilators  Steroids  Oxygen supp  Echo   F/u CXR Bronchodilators  Steroids  Oxygen supp  Echo   F/u CXR  Spiriva  [facility]fts as OP

## 2019-08-03 NOTE — BEHAVIORAL HEALTH ASSESSMENT NOTE - NSBHCHARTREVIEWLAB_PSY_A_CORE FT
13.1   8.70  )-----------( 147      ( 03 Aug 2019 05:58 )             40.4     08-03    143  |  113<H>  |  19<H>  ----------------------------<  95  4.1   |  25  |  1.15    Ca    7.2<L>      03 Aug 2019 05:58  Phos  2.6     08-03  Mg     2.1     08-03    TPro  6.2  /  Alb  3.2<L>  /  TBili  0.2  /  DBili  x   /  AST  30  /  ALT  34  /  AlkPhos  53  08-02    LIVER FUNCTIONS - ( 02 Aug 2019 14:29 )  Alb: 3.2 g/dL / Pro: 6.2 g/dL / ALK PHOS: 53 U/L / ALT: 34 U/L DA / AST: 30 U/L / GGT: x           TSH    B12  Vitamin B12, Serum: 281 pg/mL (08-03-19 @ 10:45)    Folate  Folate, Serum: 17.8 ng/mL (08-03-19 @ 10:45)

## 2019-08-03 NOTE — BEHAVIORAL HEALTH ASSESSMENT NOTE - NSBHCHARTREVIEWVS_PSY_A_CORE FT
Vital Signs Last 24 Hrs  T(C): 36.6 (03 Aug 2019 13:27), Max: 37.1 (02 Aug 2019 15:38)  T(F): 97.8 (03 Aug 2019 13:27), Max: 98.7 (02 Aug 2019 15:38)  HR: 56 (03 Aug 2019 13:27) (52 - 57)  BP: 97/81 (03 Aug 2019 13:27) (97/81 - 125/64)  BP(mean): --  RR: 18 (03 Aug 2019 13:27) (16 - 20)  SpO2: 96% (03 Aug 2019 13:27) (93% - 100%)

## 2019-08-04 DIAGNOSIS — I27.20 PULMONARY HYPERTENSION, UNSPECIFIED: ICD-10-CM

## 2019-08-04 RX ORDER — LANOLIN ALCOHOL/MO/W.PET/CERES
5 CREAM (GRAM) TOPICAL AT BEDTIME
Refills: 0 | Status: DISCONTINUED | OUTPATIENT
Start: 2019-08-04 | End: 2019-08-08

## 2019-08-04 RX ORDER — PREGABALIN 225 MG/1
1000 CAPSULE ORAL DAILY
Refills: 0 | Status: DISCONTINUED | OUTPATIENT
Start: 2019-08-04 | End: 2019-08-08

## 2019-08-04 RX ORDER — TRAZODONE HCL 50 MG
150 TABLET ORAL DAILY
Refills: 0 | Status: DISCONTINUED | OUTPATIENT
Start: 2019-08-04 | End: 2019-08-08

## 2019-08-04 RX ADMIN — Medication 0.25 MILLIGRAM(S): at 12:51

## 2019-08-04 RX ADMIN — APIXABAN 5 MILLIGRAM(S): 2.5 TABLET, FILM COATED ORAL at 18:34

## 2019-08-04 RX ADMIN — BUPROPION HYDROCHLORIDE 300 MILLIGRAM(S): 150 TABLET, EXTENDED RELEASE ORAL at 12:52

## 2019-08-04 RX ADMIN — Medication 3 MILLILITER(S): at 08:43

## 2019-08-04 RX ADMIN — Medication 150 MILLIGRAM(S): at 21:24

## 2019-08-04 RX ADMIN — Medication 3 MILLILITER(S): at 20:17

## 2019-08-04 RX ADMIN — AZITHROMYCIN 250 MILLIGRAM(S): 500 TABLET, FILM COATED ORAL at 21:24

## 2019-08-04 RX ADMIN — Medication 5 MILLIGRAM(S): at 01:14

## 2019-08-04 RX ADMIN — Medication 175 MICROGRAM(S): at 05:26

## 2019-08-04 RX ADMIN — Medication 3 MILLILITER(S): at 15:08

## 2019-08-04 RX ADMIN — APIXABAN 5 MILLIGRAM(S): 2.5 TABLET, FILM COATED ORAL at 05:26

## 2019-08-04 RX ADMIN — SIMVASTATIN 40 MILLIGRAM(S): 20 TABLET, FILM COATED ORAL at 21:24

## 2019-08-04 RX ADMIN — CITALOPRAM 20 MILLIGRAM(S): 10 TABLET, FILM COATED ORAL at 12:52

## 2019-08-04 NOTE — PROGRESS NOTE ADULT - PROBLEM SELECTOR PLAN 3
hx of PTSD  - continue home medications  -Dr. Bryant psych consult noted: clonazepam increased to 0.25mg BID due to anxiety

## 2019-08-04 NOTE — PROGRESS NOTE ADULT - SUBJECTIVE AND OBJECTIVE BOX
Patient is a 73y old  Male who presents with a chief complaint of Shortness of breath. (03 Aug 2019 10:54)    Awake , alert , lying in bed in NAD.     INTERVAL HPI/OVERNIGHT EVENTS:      VITAL SIGNS:  T(F): 97.7 (08-04-19 @ 05:24)  HR: 50 (08-04-19 @ 05:24)  BP: 96/52 (08-04-19 @ 05:24)  RR: 17 (08-04-19 @ 05:24)  SpO2: 96% (08-04-19 @ 05:24)  Wt(kg): --  I&O's Detail          REVIEW OF SYSTEMS:    CONSTITUTIONAL:  No fevers, chills, sweats    HEENT:  Eyes:  No diplopia or blurred vision. ENT:  No earache, sore throat or runny nose.    CARDIOVASCULAR:  No pressure, squeezing, tightness, or heaviness about the chest; no palpitations.    RESPIRATORY:  Per HPI    GASTROINTESTINAL:  No abdominal pain, nausea, vomiting or diarrhea.    GENITOURINARY:  No dysuria, frequency or urgency.    NEUROLOGIC:  No paresthesias, fasciculations, seizures or weakness.    PSYCHIATRIC:  No disorder of thought or mood.      PHYSICAL EXAM:    Constitutional: Well developed and nourished  Eyes:Perrla  ENMT: normal  Neck:supple  Respiratory: good air entry  Cardiovascular: S1 S2 regular  Gastrointestinal: Soft, Non tender  Extremities: No edema  Vascular:normal  Neurological:Awake, alert,Ox3  Musculoskeletal:Normal      MEDICATIONS  (STANDING):  ALBUTerol/ipratropium for Nebulization 3 milliLiter(s) Nebulizer every 6 hours  apixaban 5 milliGRAM(s) Oral every 12 hours  azithromycin  IVPB 250 milliGRAM(s) IV Intermittent every 24 hours  azithromycin  IVPB      buDESOnide 160 MICROgram(s)/formoterol 4.5 MICROgram(s) Inhaler 2 Puff(s) Inhalation two times a day  buPROPion XL . 300 milliGRAM(s) Oral daily  citalopram 20 milliGRAM(s) Oral daily  clonazePAM  Tablet 0.25 milliGRAM(s) Oral daily  levothyroxine 175 MICROGram(s) Oral daily  melatonin 5 milliGRAM(s) Oral at bedtime  simvastatin 40 milliGRAM(s) Oral at bedtime  tiotropium 18 MICROgram(s) Capsule 1 Capsule(s) Inhalation daily  traZODone 150 milliGRAM(s) Oral daily    MEDICATIONS  (PRN):      Allergies    Allergy Status Unknown    Intolerances        LABS:                        13.1   8.70  )-----------( 147      ( 03 Aug 2019 05:58 )             40.4     08-03    143  |  113<H>  |  19<H>  ----------------------------<  95  4.1   |  25  |  1.15    Ca    7.2<L>      03 Aug 2019 05:58  Phos  2.6     08-03  Mg     2.1     08-03    TPro  6.2  /  Alb  3.2<L>  /  TBili  0.2  /  DBili  x   /  AST  30  /  ALT  34  /  AlkPhos  53  08-02    PT/INR - ( 02 Aug 2019 14:29 )   PT: 12.9 sec;   INR: 1.16 ratio         PTT - ( 02 Aug 2019 14:29 )  PTT:32.0 sec      CARDIAC MARKERS ( 02 Aug 2019 14:29 )  <0.015 ng/mL / x     / 242 U/L / x     / 4.5 ng/mL      CAPILLARY BLOOD GLUCOSE        pro-bnp 185 08-02 @ 14:29     d-dimer --  08-02 @ 14:29      RADIOLOGY & ADDITIONAL TESTS:    CXR:    Ct scan chest:  < from: CT Angio Chest w/ IV Cont (08.02.19 @ 15:53) >  IMPRESSION:   No evidence of pulmonary embolism.  Dilated ascending aorta measuring 4.1 cm.   Coronary arterial calcifications.    < end of copied text >    ekg;    echo: Patient is a 73y old  Male who presents with a chief complaint of Shortness of breath. (03 Aug 2019 10:54)    Awake , alert , sitting in bed in NAD.     INTERVAL HPI/OVERNIGHT EVENTS:      VITAL SIGNS:  T(F): 97.7 (08-04-19 @ 05:24)  HR: 50 (08-04-19 @ 05:24)  BP: 96/52 (08-04-19 @ 05:24)  RR: 17 (08-04-19 @ 05:24)  SpO2: 96% (08-04-19 @ 05:24)  Wt(kg): --  I&O's Detail          REVIEW OF SYSTEMS:    CONSTITUTIONAL:  No fevers, chills, sweats    HEENT:  Eyes:  No diplopia or blurred vision. ENT:  No earache, sore throat or runny nose.    CARDIOVASCULAR:  No pressure, squeezing, tightness, or heaviness about the chest; no palpitations.    RESPIRATORY:  Per HPI    GASTROINTESTINAL:  No abdominal pain, nausea, vomiting or diarrhea.    GENITOURINARY:  No dysuria, frequency or urgency.    NEUROLOGIC:  No paresthesias, fasciculations, seizures or weakness.    PSYCHIATRIC:  No disorder of thought or mood.      PHYSICAL EXAM:    Constitutional: Well developed and nourished  Eyes:Perrla  ENMT: normal  Neck:supple  Respiratory: good air entry  Cardiovascular: S1 S2 regular  Gastrointestinal: Soft, Non tender  Extremities: No edema  Vascular:normal  Neurological:Awake, alert,Ox3  Musculoskeletal:Normal      MEDICATIONS  (STANDING):  ALBUTerol/ipratropium for Nebulization 3 milliLiter(s) Nebulizer every 6 hours  apixaban 5 milliGRAM(s) Oral every 12 hours  azithromycin  IVPB 250 milliGRAM(s) IV Intermittent every 24 hours  azithromycin  IVPB      buDESOnide 160 MICROgram(s)/formoterol 4.5 MICROgram(s) Inhaler 2 Puff(s) Inhalation two times a day  buPROPion XL . 300 milliGRAM(s) Oral daily  citalopram 20 milliGRAM(s) Oral daily  clonazePAM  Tablet 0.25 milliGRAM(s) Oral daily  levothyroxine 175 MICROGram(s) Oral daily  melatonin 5 milliGRAM(s) Oral at bedtime  simvastatin 40 milliGRAM(s) Oral at bedtime  tiotropium 18 MICROgram(s) Capsule 1 Capsule(s) Inhalation daily  traZODone 150 milliGRAM(s) Oral daily    MEDICATIONS  (PRN):      Allergies    Allergy Status Unknown    Intolerances        LABS:                        13.1   8.70  )-----------( 147      ( 03 Aug 2019 05:58 )             40.4     08-03    143  |  113<H>  |  19<H>  ----------------------------<  95  4.1   |  25  |  1.15    Ca    7.2<L>      03 Aug 2019 05:58  Phos  2.6     08-03  Mg     2.1     08-03    TPro  6.2  /  Alb  3.2<L>  /  TBili  0.2  /  DBili  x   /  AST  30  /  ALT  34  /  AlkPhos  53  08-02    PT/INR - ( 02 Aug 2019 14:29 )   PT: 12.9 sec;   INR: 1.16 ratio         PTT - ( 02 Aug 2019 14:29 )  PTT:32.0 sec      CARDIAC MARKERS ( 02 Aug 2019 14:29 )  <0.015 ng/mL / x     / 242 U/L / x     / 4.5 ng/mL      CAPILLARY BLOOD GLUCOSE        pro-bnp 185 08-02 @ 14:29     d-dimer --  08-02 @ 14:29      RADIOLOGY & ADDITIONAL TESTS:    CXR:    Ct scan chest:  < from: CT Angio Chest w/ IV Cont (08.02.19 @ 15:53) >  IMPRESSION:   No evidence of pulmonary embolism.  Dilated ascending aorta measuring 4.1 cm.   Coronary arterial calcifications.    < end of copied text >    ekg;    echo:  < from: Transthoracic Echocardiogram (08.03.19 @ 06:48) >  CONCLUSIONS:  1. Normal mitral valve. Mild mitral regurgitation.  2. Normal trileaflet aortic valve. Mild aortic  regurgitation.  3. Mild aortic root dilatation, consider CT or chest for  further evaluation.  4. Severely dilated left atrium.  LA volume index = 51  cc/m2.  5. Eccentric left ventricular hypertrophy (dilated left  ventricle with normal relative wall thickness).  6. Normal Left Ventricular Systolic Function,  (EF = 55 to  60%)  7. Grade II diastolic dysfunction.  8. Normal right atrium.  9. Normal right ventricular size and systolic function  (TAPSE  2.1cm).  10. RV systolic pressure is moderately increased at  46 mm  Hg.  11. There is mild tricuspid regurgitation.  12. There is mild pulmonic regurgitation.  13. Trivial pericardial effusion is seen.    < end of copied text >

## 2019-08-04 NOTE — PROGRESS NOTE ADULT - SUBJECTIVE AND OBJECTIVE BOX
Patient is a 73y old  Male who presents with a chief complaint of Shortness of breath. (04 Aug 2019 09:15)    pt seen in icu [  ], reg med floor [  x ], bed [x  ], chair at bedside [   ], a+o x3 [ x ], lethargic [  ],  nad [x]          Allergies    Allergy Status Unknown        Vitals    T(F): 97.7 (08-04-19 @ 05:24), Max: 98.3 (08-03-19 @ 20:26)  HR: 61 (08-04-19 @ 09:17) (50 - 63)  BP: 96/52 (08-04-19 @ 05:24) (96/52 - 113/89)  RR: 17 (08-04-19 @ 05:24) (17 - 18)  SpO2: 97% (08-04-19 @ 09:17) (95% - 97%)  Wt(kg): --  CAPILLARY BLOOD GLUCOSE          Labs                          13.1   8.70  )-----------( 147      ( 03 Aug 2019 05:58 )             40.4       08-03    143  |  113<H>  |  19<H>  ----------------------------<  95  4.1   |  25  |  1.15    Ca    7.2<L>      03 Aug 2019 05:58  Phos  2.6     08-03  Mg     2.1     08-03    TPro  6.2  /  Alb  3.2<L>  /  TBili  0.2  /  DBili  x   /  AST  30  /  ALT  34  /  AlkPhos  53  08-02      CARDIAC MARKERS ( 02 Aug 2019 14:29 )  <0.015 ng/mL / x     / 242 U/L / x     / 4.5 ng/mL        < from: Transthoracic Echocardiogram (08.03.19 @ 06:48) >  CONCLUSIONS:  1. Normal mitral valve. Mild mitral regurgitation.  2. Normal trileaflet aortic valve. Mild aortic  regurgitation.  3. Mild aortic root dilatation, consider CT or chest for  further evaluation.  4. Severely dilated left atrium.  LA volume index = 51  cc/m2.  5. Eccentric left ventricular hypertrophy (dilated left  ventricle with normal relative wall thickness).  6. Normal Left Ventricular Systolic Function,  (EF = 55 to  60%)  7. Grade II diastolic dysfunction.  8. Normal right atrium.  9. Normal right ventricular size and systolic function  (TAPSE  2.1cm).  10. RV systolic pressure is moderately increased at  46 mm  Hg.  11. There is mild tricuspid regurgitation.  12. There is mild pulmonic regurgitation.  13. Trivial pericardial effusion is seen.    < end of copied text >      Radiology Results          Meds    MEDICATIONS  (STANDING):  ALBUTerol/ipratropium for Nebulization 3 milliLiter(s) Nebulizer every 6 hours  apixaban 5 milliGRAM(s) Oral every 12 hours  azithromycin  IVPB 250 milliGRAM(s) IV Intermittent every 24 hours  azithromycin  IVPB      buDESOnide 160 MICROgram(s)/formoterol 4.5 MICROgram(s) Inhaler 2 Puff(s) Inhalation two times a day  buPROPion XL . 300 milliGRAM(s) Oral daily  citalopram 20 milliGRAM(s) Oral daily  clonazePAM  Tablet 0.25 milliGRAM(s) Oral daily  levothyroxine 175 MICROGram(s) Oral daily  melatonin 5 milliGRAM(s) Oral at bedtime  simvastatin 40 milliGRAM(s) Oral at bedtime  tiotropium 18 MICROgram(s) Capsule 1 Capsule(s) Inhalation daily  traZODone 150 milliGRAM(s) Oral daily      MEDICATIONS  (PRN):      Physical Exam    Neuro :  no focal deficits  Respiratory: CTA B/L  CV: RRR, S1S2, no murmurs,   Abdominal: Soft, NT, ND +BS,  Extremities: No edema, + peripheral pulses      ASSESSMENT    Chronic obstructive pulmonary disease with acute exacerbation  dyspnea/orthopnea   PE r/o  anxiety   h/o Chronic GERD  COPD with asthma  Post traumatic stress disorder (PTSD)  HLD (hyperlipidemia)  Hypothyroidism  S/P thyroidectomy        PLAN    f/u cxr   cta -ve for pe noted above  pulm f/u  continue abx, bd, steriods  f/u echo noted EF 55-60% grade II diastolic dysfunction   cardio f/u  Obtain records from [place], cont eliquis for now.  psych cons noted, increase clonazepam to 0.25 mg bid due to increased anxiety. no psych contraindications to discharge.   TSH elevated  f/u endo cons   cont current meds Patient is a 73y old  Male who presents with a chief complaint of Shortness of breath. (04 Aug 2019 09:15)    pt seen in icu [  ], reg med floor [  x ], bed [x  ], chair at bedside [   ], a+o x3 [ x ], lethargic [  ],  nad [x]    c/o scrotal swelling and tenderness      Allergies    Allergy Status Unknown        Vitals    T(F): 97.7 (08-04-19 @ 05:24), Max: 98.3 (08-03-19 @ 20:26)  HR: 61 (08-04-19 @ 09:17) (50 - 63)  BP: 96/52 (08-04-19 @ 05:24) (96/52 - 113/89)  RR: 17 (08-04-19 @ 05:24) (17 - 18)  SpO2: 97% (08-04-19 @ 09:17) (95% - 97%)  Wt(kg): --  CAPILLARY BLOOD GLUCOSE          Labs                          13.1   8.70  )-----------( 147      ( 03 Aug 2019 05:58 )             40.4       08-03    143  |  113<H>  |  19<H>  ----------------------------<  95  4.1   |  25  |  1.15    Ca    7.2<L>      03 Aug 2019 05:58  Phos  2.6     08-03  Mg     2.1     08-03    TPro  6.2  /  Alb  3.2<L>  /  TBili  0.2  /  DBili  x   /  AST  30  /  ALT  34  /  AlkPhos  53  08-02      CARDIAC MARKERS ( 02 Aug 2019 14:29 )  <0.015 ng/mL / x     / 242 U/L / x     / 4.5 ng/mL        < from: Transthoracic Echocardiogram (08.03.19 @ 06:48) >  CONCLUSIONS:  1. Normal mitral valve. Mild mitral regurgitation.  2. Normal trileaflet aortic valve. Mild aortic  regurgitation.  3. Mild aortic root dilatation, consider CT or chest for  further evaluation.  4. Severely dilated left atrium.  LA volume index = 51  cc/m2.  5. Eccentric left ventricular hypertrophy (dilated left  ventricle with normal relative wall thickness).  6. Normal Left Ventricular Systolic Function,  (EF = 55 to  60%)  7. Grade II diastolic dysfunction.  8. Normal right atrium.  9. Normal right ventricular size and systolic function  (TAPSE  2.1cm).  10. RV systolic pressure is moderately increased at  46 mm  Hg.  11. There is mild tricuspid regurgitation.  12. There is mild pulmonic regurgitation.  13. Trivial pericardial effusion is seen.    < end of copied text >      Radiology Results          Meds    MEDICATIONS  (STANDING):  ALBUTerol/ipratropium for Nebulization 3 milliLiter(s) Nebulizer every 6 hours  apixaban 5 milliGRAM(s) Oral every 12 hours  azithromycin  IVPB 250 milliGRAM(s) IV Intermittent every 24 hours  azithromycin  IVPB      buDESOnide 160 MICROgram(s)/formoterol 4.5 MICROgram(s) Inhaler 2 Puff(s) Inhalation two times a day  buPROPion XL . 300 milliGRAM(s) Oral daily  citalopram 20 milliGRAM(s) Oral daily  clonazePAM  Tablet 0.25 milliGRAM(s) Oral daily  levothyroxine 175 MICROGram(s) Oral daily  melatonin 5 milliGRAM(s) Oral at bedtime  simvastatin 40 milliGRAM(s) Oral at bedtime  tiotropium 18 MICROgram(s) Capsule 1 Capsule(s) Inhalation daily  traZODone 150 milliGRAM(s) Oral daily      MEDICATIONS  (PRN):      Physical Exam    Neuro :  no focal deficits  Respiratory: CTA B/L  CV: RRR, S1S2, no murmurs,   Abdominal: Soft, NT, ND +BS,  Extremities: No edema, + peripheral pulses  scrotum : moderate scrotal erythema and edema, testicular tenderness to palp    ASSESSMENT    Chronic obstructive pulmonary disease with acute exacerbation  dyspnea/orthopnea   PE r/o  anxiety   scrotal edema  h/o Chronic GERD  COPD with asthma  Post traumatic stress disorder (PTSD)  HLD (hyperlipidemia)  Hypothyroidism  S/P thyroidectomy        PLAN    f/u cxr   cta -ve for pe noted above  pulm f/u  continue abx, bronchodilators, steriods  echo noted EF 55-60% grade II diastolic dysfunction   cardio f/u  Obtain records from [place], cont eliquis for now.  psych cons noted, increase clonazepam to 0.25 mg bid due to increased anxiety. no psych contraindications to discharge.   TSH elevated  f/u endo cons   f/u scrotal sono  scrotal support  urology cons  cont current meds

## 2019-08-04 NOTE — PROGRESS NOTE ADULT - PROBLEM SELECTOR PLAN 5
Occupational Health Nurse/MA/Tech visit only.    As above. pt on citalopram, trazodone, buproprion, clonazepam  continue home meds.

## 2019-08-04 NOTE — PROGRESS NOTE ADULT - SUBJECTIVE AND OBJECTIVE BOX
PGY 1 Note discussed with supervising resident and primary attending    Patient is a 73y old  Male who presents with a chief complaint of Shortness of breath. (04 Aug 2019 09:28)    INTERVAL HPI/OVERNIGHT EVENTS: Patient seen and examined at the bedside. Patient states that he has some shortness of breath but states that it is improved since admission. Patient also states having some lower extremity edema but states that it has been there for a month and isn't as bad as before. Denies any other concerns or complaints. No acute events overnight.     MEDICATIONS  (STANDING):  ALBUTerol/ipratropium for Nebulization 3 milliLiter(s) Nebulizer every 6 hours  apixaban 5 milliGRAM(s) Oral every 12 hours  azithromycin  IVPB 250 milliGRAM(s) IV Intermittent every 24 hours  azithromycin  IVPB      buDESOnide 160 MICROgram(s)/formoterol 4.5 MICROgram(s) Inhaler 2 Puff(s) Inhalation two times a day  buPROPion XL . 300 milliGRAM(s) Oral daily  citalopram 20 milliGRAM(s) Oral daily  clonazePAM  Tablet 0.25 milliGRAM(s) Oral daily  levothyroxine 175 MICROGram(s) Oral daily  melatonin 5 milliGRAM(s) Oral at bedtime  simvastatin 40 milliGRAM(s) Oral at bedtime  tiotropium 18 MICROgram(s) Capsule 1 Capsule(s) Inhalation daily  traZODone 150 milliGRAM(s) Oral daily    __________________________________________________  REVIEW OF SYSTEMS:    CONSTITUTIONAL: No fever   EYES: no visual disturbances or eye pain  NECK: No pain  RESPIRATORY: No cough; Mild shortness of breath  CARDIOVASCULAR: No chest pain  GASTROINTESTINAL: No abdominal pain. No nausea or vomiting   NEUROLOGICAL: No headache   MUSCULOSKELETAL: No joint pain, no muscle pain  GENITOURINARY: no dysuria  PSYCHIATRY: no anxiety  ALL OTHER  ROS negative        Vital Signs Last 24 Hrs  T(C): 36.5 (04 Aug 2019 05:24), Max: 36.8 (03 Aug 2019 20:26)  T(F): 97.7 (04 Aug 2019 05:24), Max: 98.3 (03 Aug 2019 20:26)  HR: 61 (04 Aug 2019 09:17) (50 - 63)  BP: 96/52 (04 Aug 2019 05:24) (96/52 - 113/89)  RR: 17 (04 Aug 2019 05:24) (17 - 18)  SpO2: 97% (04 Aug 2019 09:17) (95% - 97%)    ________________________________________________  PHYSICAL EXAM:  GENERAL: Patient seen in bed and does not appear to be in any acute distress  HEENT: Normocephalic; conjunctivae and sclerae clear   NECK : supple  CHEST/LUNG: Clear to auscultation bilaterally    HEART: S1 S2, regular; no murmurs  ABDOMEN: Soft, Nontender, Nondistended; Bowel sounds present  EXTREMITIES: no edema; no calf tenderness  SKIN: warm and dry  NERVOUS SYSTEM: Awake and alert; Oriented to place, person and time    _________________________________________________  LABS:                        13.1   8.70  )-----------( 147      ( 03 Aug 2019 05:58 )             40.4     08-03    143  |  113<H>  |  19<H>  ----------------------------<  95  4.1   |  25  |  1.15    Ca    7.2<L>      03 Aug 2019 05:58  Phos  2.6     08-03  Mg     2.1     08-03    TPro  6.2  /  Alb  3.2<L>  /  TBili  0.2  /  DBili  x   /  AST  30  /  ALT  34  /  AlkPhos  53  08-02    PT/INR - ( 02 Aug 2019 14:29 )   PT: 12.9 sec;   INR: 1.16 ratio         PTT - ( 02 Aug 2019 14:29 )  PTT:32.0 sec      RADIOLOGY & ADDITIONAL TESTS:    Imaging Personally Reviewed:  YES    < from: CT Angio Chest w/ IV Cont (08.02.19 @ 15:53) >  IMPRESSION:   No evidence of pulmonary embolism.  Dilated ascending aorta measuring 4.1 cm.   Coronary arterial calcifications.    < end of copied text >    < from: Transthoracic Echocardiogram (08.03.19 @ 06:48) >  CONCLUSIONS:  1. Normal mitral valve. Mild mitral regurgitation.  2. Normal trileaflet aortic valve. Mild aortic  regurgitation.  3. Mild aortic root dilatation, consider CT or chest for  further evaluation.  4. Severely dilated left atrium.  LA volume index = 51  cc/m2.  5. Eccentric left ventricular hypertrophy (dilated left  ventricle with normal relative wall thickness).  6. Normal Left Ventricular Systolic Function,  (EF = 55 to  60%)  7. Grade II diastolic dysfunction.  8. Normal right atrium.  9. Normal right ventricular size and systolic function  (TAPSE  2.1cm).  10. RV systolic pressure is moderately increased at  46 mm  Hg.  11. There is mild tricuspid regurgitation.  12. There is mild pulmonic regurgitation.  13. Trivial pericardial effusion is seen.    < end of copied text >      Consultant(s) Notes Reviewed:   YES    Care Discussed with Consultants :     Plan of care was discussed with patient and /or primary care giver; all questions and concerns were addressed and care was aligned with patient's wishes.

## 2019-08-04 NOTE — PROGRESS NOTE ADULT - PROBLEM SELECTOR PLAN 2
COPD exacerbation vs Acute CHF vs panic attack (as pt has PTSD) or from PE it self as pt was recently diagnosed with PE  pt was not taking eliquis at home as he did not have medications   CTA done negative for PE  Echo showed grade 2 diastolic dysfunction, EF of 55-60%   dilated aorta of 4.1cm noted on CTA  -f/u cardio Dr. Brumfield  -f/u stress test

## 2019-08-04 NOTE — PROGRESS NOTE ADULT - PROBLEM SELECTOR PLAN 4
pt on citalopram, trazodone, buproprion, clonazepam  continue home meds. Monitor H/H  PRBC as needed.

## 2019-08-04 NOTE — PROGRESS NOTE ADULT - PROBLEM SELECTOR PLAN 1
-states SOB is improving but still present  - At home on fluticasone nasal spray  - continue bronchodilators and zithromax for copd exacerbation vs bronchitis   - hx of smoking  - give NC 2L if SO2 <92%  - pulmonary consulted noted- Dr. Thrasher; continue to f/u

## 2019-08-05 ENCOUNTER — TRANSCRIPTION ENCOUNTER (OUTPATIENT)
Age: 74
End: 2019-08-05

## 2019-08-05 DIAGNOSIS — N50.89 OTHER SPECIFIED DISORDERS OF THE MALE GENITAL ORGANS: ICD-10-CM

## 2019-08-05 LAB
ANION GAP SERPL CALC-SCNC: 8 MMOL/L — SIGNIFICANT CHANGE UP (ref 5–17)
BASOPHILS # BLD AUTO: 0.07 K/UL — SIGNIFICANT CHANGE UP (ref 0–0.2)
BASOPHILS NFR BLD AUTO: 0.6 % — SIGNIFICANT CHANGE UP (ref 0–2)
BUN SERPL-MCNC: 17 MG/DL — SIGNIFICANT CHANGE UP (ref 7–18)
CALCIUM SERPL-MCNC: 8.3 MG/DL — LOW (ref 8.4–10.5)
CHLORIDE SERPL-SCNC: 108 MMOL/L — SIGNIFICANT CHANGE UP (ref 96–108)
CO2 SERPL-SCNC: 28 MMOL/L — SIGNIFICANT CHANGE UP (ref 22–31)
CREAT SERPL-MCNC: 1.12 MG/DL — SIGNIFICANT CHANGE UP (ref 0.5–1.3)
EOSINOPHIL # BLD AUTO: 0.42 K/UL — SIGNIFICANT CHANGE UP (ref 0–0.5)
EOSINOPHIL NFR BLD AUTO: 3.9 % — SIGNIFICANT CHANGE UP (ref 0–6)
GLUCOSE SERPL-MCNC: 98 MG/DL — SIGNIFICANT CHANGE UP (ref 70–99)
HCT VFR BLD CALC: 46.5 % — SIGNIFICANT CHANGE UP (ref 39–50)
HGB BLD-MCNC: 14.5 G/DL — SIGNIFICANT CHANGE UP (ref 13–17)
IMM GRANULOCYTES NFR BLD AUTO: 0.4 % — SIGNIFICANT CHANGE UP (ref 0–1.5)
LYMPHOCYTES # BLD AUTO: 4.41 K/UL — HIGH (ref 1–3.3)
LYMPHOCYTES # BLD AUTO: 40.5 % — SIGNIFICANT CHANGE UP (ref 13–44)
MAGNESIUM SERPL-MCNC: 2.1 MG/DL — SIGNIFICANT CHANGE UP (ref 1.6–2.6)
MCHC RBC-ENTMCNC: 30.1 PG — SIGNIFICANT CHANGE UP (ref 27–34)
MCHC RBC-ENTMCNC: 31.2 GM/DL — LOW (ref 32–36)
MCV RBC AUTO: 96.7 FL — SIGNIFICANT CHANGE UP (ref 80–100)
MONOCYTES # BLD AUTO: 1.04 K/UL — HIGH (ref 0–0.9)
MONOCYTES NFR BLD AUTO: 9.6 % — SIGNIFICANT CHANGE UP (ref 2–14)
NEUTROPHILS # BLD AUTO: 4.91 K/UL — SIGNIFICANT CHANGE UP (ref 1.8–7.4)
NEUTROPHILS NFR BLD AUTO: 45 % — SIGNIFICANT CHANGE UP (ref 43–77)
NRBC # BLD: 0 /100 WBCS — SIGNIFICANT CHANGE UP (ref 0–0)
PHOSPHATE SERPL-MCNC: 2.9 MG/DL — SIGNIFICANT CHANGE UP (ref 2.5–4.5)
PLATELET # BLD AUTO: 161 K/UL — SIGNIFICANT CHANGE UP (ref 150–400)
POTASSIUM SERPL-MCNC: 4 MMOL/L — SIGNIFICANT CHANGE UP (ref 3.5–5.3)
POTASSIUM SERPL-SCNC: 4 MMOL/L — SIGNIFICANT CHANGE UP (ref 3.5–5.3)
RBC # BLD: 4.81 M/UL — SIGNIFICANT CHANGE UP (ref 4.2–5.8)
RBC # FLD: 14.4 % — SIGNIFICANT CHANGE UP (ref 10.3–14.5)
SODIUM SERPL-SCNC: 144 MMOL/L — SIGNIFICANT CHANGE UP (ref 135–145)
WBC # BLD: 10.89 K/UL — HIGH (ref 3.8–10.5)
WBC # FLD AUTO: 10.89 K/UL — HIGH (ref 3.8–10.5)

## 2019-08-05 RX ORDER — ACETAMINOPHEN 500 MG
650 TABLET ORAL ONCE
Refills: 0 | Status: COMPLETED | OUTPATIENT
Start: 2019-08-05 | End: 2019-08-05

## 2019-08-05 RX ADMIN — Medication 650 MILLIGRAM(S): at 07:08

## 2019-08-05 RX ADMIN — APIXABAN 5 MILLIGRAM(S): 2.5 TABLET, FILM COATED ORAL at 06:16

## 2019-08-05 RX ADMIN — Medication 5 MILLIGRAM(S): at 21:05

## 2019-08-05 RX ADMIN — BUPROPION HYDROCHLORIDE 300 MILLIGRAM(S): 150 TABLET, EXTENDED RELEASE ORAL at 12:55

## 2019-08-05 RX ADMIN — Medication 3 MILLILITER(S): at 15:41

## 2019-08-05 RX ADMIN — Medication 3 MILLILITER(S): at 04:21

## 2019-08-05 RX ADMIN — Medication 175 MICROGRAM(S): at 06:16

## 2019-08-05 RX ADMIN — Medication 3 MILLILITER(S): at 21:24

## 2019-08-05 RX ADMIN — PREGABALIN 1000 MICROGRAM(S): 225 CAPSULE ORAL at 12:55

## 2019-08-05 RX ADMIN — APIXABAN 5 MILLIGRAM(S): 2.5 TABLET, FILM COATED ORAL at 17:42

## 2019-08-05 RX ADMIN — Medication 650 MILLIGRAM(S): at 06:29

## 2019-08-05 RX ADMIN — Medication 150 MILLIGRAM(S): at 21:06

## 2019-08-05 RX ADMIN — SIMVASTATIN 40 MILLIGRAM(S): 20 TABLET, FILM COATED ORAL at 21:05

## 2019-08-05 RX ADMIN — CITALOPRAM 20 MILLIGRAM(S): 10 TABLET, FILM COATED ORAL at 12:55

## 2019-08-05 RX ADMIN — AZITHROMYCIN 250 MILLIGRAM(S): 500 TABLET, FILM COATED ORAL at 21:05

## 2019-08-05 RX ADMIN — Medication 0.25 MILLIGRAM(S): at 12:55

## 2019-08-05 NOTE — DISCHARGE NOTE PROVIDER - NSDCCPCAREPLAN_GEN_ALL_CORE_FT
PRINCIPAL DISCHARGE DIAGNOSIS  Diagnosis: COPD exacerbation  Assessment and Plan of Treatment: You were found to be in COPD exacerbation. You were treated with IV abx, bronchodilators and steroids. Your symptoms have resolved. You need to follow up with your pulmonologist upon discharge      SECONDARY DISCHARGE DIAGNOSES  Diagnosis: Pulmonary HTN  Assessment and Plan of Treatment: Echo showed preserved EF with grade 2 Diastolic dysfunction PRINCIPAL DISCHARGE DIAGNOSIS  Diagnosis: COPD exacerbation  Assessment and Plan of Treatment: You were found to be in COPD exacerbation. You were treated with IV abx, bronchodilators and steroids. Your symptoms have resolved. You need to follow up with your pulmonologist upon discharge      SECONDARY DISCHARGE DIAGNOSES  Diagnosis: Scrotal swelling  Assessment and Plan of Treatment: Follow up with Dr. Griffin as outpatient.    Diagnosis: Hypothyroidism, unspecified type  Assessment and Plan of Treatment: Follow up with your endocrinologist for further management.    Diagnosis: Pulmonary HTN  Assessment and Plan of Treatment: Echo showed preserved EF with grade 2 Diastolic dysfunction. Follow up with your PCP.

## 2019-08-05 NOTE — DISCHARGE NOTE PROVIDER - HOSPITAL COURSE
74 yo male ex smoker with COPD, hypothyroidism, HLD, PTSD and hx of recent PE s/p 1 week of eliquis, came with complains of difficulty breathing. Pt states his SOB started a month ago and he has been admitted to the hospital 3 times in the past month. He states he has SOB with exertion, lying flat in bed, and wakes up gasping for air. He can walk up to 3 blocks before getting SOB. He says he has lower ext edema. 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 Pt was admitted to [facility] 3 weeks ago and was diagnosed with PE. He was discharged on 7 days of Eliquis and didn't follow up with a doctor about this. Pt doesn't remember how he was this morning but had an appointment with his PCP who heard about his recent complains and told him to seek immediate attention at the ED. He said he was feeling SOB, dizzy, blurry vision his heart was racing, he felt weak and thought he was going to past out. Pt has a dry cough, no hemoptysis, he has lost 40lbs, doesn't have anorexia denies CP, headache, fevers, or chills. Patient is a 72 yo male ex smoker with COPD, hypothyroidism, HLD, PTSD and hx of recent PE s/p 1 week of eliquis, coming in with     complaints of difficulty breathing.              ED with SOB and was found to be tachypneic         Pt was admitted for dyspnea to r/o CHF exacerbation vs COPD exacerbation.        Started on bronchodilators and zithromax for copd exacerbation vs  bronchitis         CTA Angio was negative for PE.         Given eliquis for history of PE.        Seen by urology for scrotal swelling and pain. Testicular U/S showed Increased color Doppler flow in the right testis, compatible     with orchitis. Mild bilateral hydroceles. Mild left varicocele. Recommended outpatient follow up.         Patient is medically stable for discharge. Case was discussed with attending.

## 2019-08-05 NOTE — PROGRESS NOTE ADULT - SUBJECTIVE AND OBJECTIVE BOX
Pt is awake, alert, lying in bed in NAD. Pt c/o scrotal edema; did not have at home. Had an episode of SOB overnight; improved with nebulizer treatment and O2, NC.     INTERVAL HPI/OVERNIGHT EVENTS:      VITAL SIGNS:  T(F): 97.6 (08-05-19 @ 05:26)  HR: 55 (08-05-19 @ 05:26)  BP: 109/59 (08-05-19 @ 05:26)  RR: 18 (08-05-19 @ 05:26)  SpO2: 100% (08-05-19 @ 05:26)  Wt(kg): --  I&O's Detail          REVIEW OF SYSTEMS:    CONSTITUTIONAL:  No fevers, chills, sweats    HEENT:  Eyes:  No diplopia or blurred vision. ENT:  No earache, sore throat or runny nose.    CARDIOVASCULAR:  No pressure, squeezing, tightness, or heaviness about the chest; no palpitations.    RESPIRATORY:  Per HPI    GASTROINTESTINAL:  No abdominal pain, nausea, vomiting or diarrhea.    GENITOURINARY:  No dysuria, frequency or urgency.    NEUROLOGIC:  No paresthesias, fasciculations, seizures or weakness.    PSYCHIATRIC:  No disorder of thought or mood.      PHYSICAL EXAM:    Constitutional: Well developed and nourished  Eyes:Perrla  ENMT: normal  Neck:supple  Respiratory: good air entry  Cardiovascular: S1 S2 regular  Gastrointestinal: Soft, Non tender  Extremities: No edema  Vascular:normal  Neurological:Awake, alert,Ox3  Musculoskeletal:Normal  : + scrotal edema.       MEDICATIONS  (STANDING):  ALBUTerol/ipratropium for Nebulization 3 milliLiter(s) Nebulizer every 6 hours  apixaban 5 milliGRAM(s) Oral every 12 hours  azithromycin  IVPB      azithromycin  IVPB 250 milliGRAM(s) IV Intermittent every 24 hours  buDESOnide 160 MICROgram(s)/formoterol 4.5 MICROgram(s) Inhaler 2 Puff(s) Inhalation two times a day  buPROPion XL . 300 milliGRAM(s) Oral daily  citalopram 20 milliGRAM(s) Oral daily  clonazePAM  Tablet 0.25 milliGRAM(s) Oral daily  cyanocobalamin 1000 MICROGram(s) Oral daily  levothyroxine 175 MICROGram(s) Oral daily  melatonin 5 milliGRAM(s) Oral at bedtime  simvastatin 40 milliGRAM(s) Oral at bedtime  tiotropium 18 MICROgram(s) Capsule 1 Capsule(s) Inhalation daily  traZODone 150 milliGRAM(s) Oral daily    MEDICATIONS  (PRN):      Allergies    Allergy Status Unknown    Intolerances        LABS:                        14.5   10.89 )-----------( 161      ( 05 Aug 2019 05:20 )             46.5     08-05    144  |  108  |  17  ----------------------------<  98  4.0   |  28  |  1.12    Ca    8.3<L>      05 Aug 2019 05:20  Phos  2.9     08-05  Mg     2.1     08-05                CAPILLARY BLOOD GLUCOSE        pro-bnp 185 08-02 @ 14:29     d-dimer --  08-02 @ 14:29      RADIOLOGY & ADDITIONAL TESTS:    CXR:    Ct scan chest:  < from: CT Angio Chest w/ IV Cont (08.02.19 @ 15:53) >  IMPRESSION:   No evidence of pulmonary embolism.  Dilated ascending aorta measuring 4.1 cm.   Coronary arterial calcifications.    < end of copied text >    ekg;    echo:  < from: Transthoracic Echocardiogram (08.03.19 @ 06:48) >  CONCLUSIONS:  1. Normal mitral valve. Mild mitral regurgitation.  2. Normal trileaflet aortic valve. Mild aortic  regurgitation.  3. Mild aortic root dilatation, consider CT or chest for  further evaluation.  4. Severely dilated left atrium.  LA volume index = 51  cc/m2.  5. Eccentric left ventricular hypertrophy (dilated left  ventricle with normal relative wall thickness).  6. Normal Left Ventricular Systolic Function,  (EF = 55 to  60%)  7. Grade II diastolic dysfunction.  8. Normal right atrium.  9. Normal right ventricular size and systolic function  (TAPSE  2.1cm).  10. RV systolic pressure is moderately increased at  46 mm  Hg.  11. There is mild tricuspid regurgitation.  12. There is mild pulmonic regurgitation.  13. Trivial pericardial effusion is seen.

## 2019-08-05 NOTE — CHART NOTE - NSCHARTNOTEFT_GEN_A_CORE
Writer was notified that patient was having scrotal pain following urination. Patient was prescribed PO Tylenol for pain relief    Primary care team to follow-up in the am.

## 2019-08-05 NOTE — DISCHARGE NOTE PROVIDER - NSDCFUSCHEDAPPT_GEN_ALL_CORE_FT
PUNEET MENDOZA ; 08/06/2019 ; NPP PulmMed [address] [facility]  PUNEET MENDOZA ; 08/08/2019 ; NPP Derm [address] [facility]  PUNEET MENDOZA ; 08/13/2019 ; NPP Otolaryng [address] [facility]  PUNEET MENDOZA ; 08/20/2019 ; NPP PulmMed [address] [facility]  PUNEET MENDOZA ; 09/17/2019 ; NPP Psychiatry [address] [facility] PUNEET MENDOZA ; 08/08/2019 ; NPP Derm [address] [facility]  PUNEET MENDOZA ; 08/13/2019 ; NPP Otolaryng [address] [facility]  PUNEET MENDOZA ; 08/20/2019 ; NPP PulmMed [address] [facility]  PUNEET MENDOZA ; 09/17/2019 ; NPP Psychiatry [address] [facility]

## 2019-08-05 NOTE — PROGRESS NOTE ADULT - PROBLEM SELECTOR PLAN 2
COPD exacerbation vs Acute CHF vs panic attack (as pt has PTSD) or from PE it self as pt was recently diagnosed with PE  pt was not taking eliquis at home as he did not have medications   CTA done negative for PE  Echo showed grade 2 diastolic dysfunction, EF of 55-60%   dilated aorta of 4.1cm noted on CTA; patient can f/u as outpatient  -f/u cardio Dr. Brumfield  -f/u stress test results to rule out ischemia  -continue eliquis

## 2019-08-05 NOTE — PROGRESS NOTE ADULT - SUBJECTIVE AND OBJECTIVE BOX
Patient is a 73y old  Male who presents with a chief complaint of Shortness of breath. (05 Aug 2019 11:21)    pt seen in icu [  ], reg med floor [  x ], bed [x  ], chair at bedside [   ], a+o x3 [ x ], lethargic [  ],  nad [x]      Allergies    Allergy Status Unknown        Vitals    T(F): 97.6 (08-05-19 @ 05:26), Max: 98.5 (08-04-19 @ 20:18)  HR: 55 (08-05-19 @ 05:26) (55 - 55)  BP: 109/59 (08-05-19 @ 05:26) (109/59 - 118/61)  RR: 18 (08-05-19 @ 05:26) (17 - 18)  SpO2: 100% (08-05-19 @ 05:26) (97% - 100%)  Wt(kg): --  CAPILLARY BLOOD GLUCOSE          Labs                          14.5   10.89 )-----------( 161      ( 05 Aug 2019 05:20 )             46.5       08-05    144  |  108  |  17  ----------------------------<  98  4.0   |  28  |  1.12    Ca    8.3<L>      05 Aug 2019 05:20  Phos  2.9     08-05  Mg     2.1     08-05      < from: Nuclear Stress Test-Pharmacologic (08.05.19 @ 03:44) >  IMPRESSIONS:Normal Study  * Negative ECG evidence of ischemia after IV of Lexiscan.  * Reviewof raw data shows: Diaphragmatic artifact.  * There are medium sized, severe defects in  inferior &  inferoapical walls that are predominantly fixed consistent  with diaphragmatic attenuation artifact.  * Gated wall motion analysis is performed, and shows  normal wall motion with post stress LVEF of 52%.      < end of copied text >      Radiology Results            Meds    MEDICATIONS  (STANDING):  ALBUTerol/ipratropium for Nebulization 3 milliLiter(s) Nebulizer every 6 hours  apixaban 5 milliGRAM(s) Oral every 12 hours  azithromycin  IVPB      azithromycin  IVPB 250 milliGRAM(s) IV Intermittent every 24 hours  buDESOnide 160 MICROgram(s)/formoterol 4.5 MICROgram(s) Inhaler 2 Puff(s) Inhalation two times a day  buPROPion XL . 300 milliGRAM(s) Oral daily  citalopram 20 milliGRAM(s) Oral daily  clonazePAM  Tablet 0.25 milliGRAM(s) Oral daily  cyanocobalamin 1000 MICROGram(s) Oral daily  levothyroxine 175 MICROGram(s) Oral daily  melatonin 5 milliGRAM(s) Oral at bedtime  simvastatin 40 milliGRAM(s) Oral at bedtime  tiotropium 18 MICROgram(s) Capsule 1 Capsule(s) Inhalation daily  traZODone 150 milliGRAM(s) Oral daily      MEDICATIONS  (PRN):      Physical Exam      Neuro :  no focal deficits  Respiratory: CTA B/L  CV: RRR, S1S2, no murmurs,   Abdominal: Soft, NT, ND +BS,  Extremities: No edema, + peripheral pulses      ASSESSMENT    Chronic obstructive pulmonary disease with acute exacerbation  dyspnea/orthopnea   PE r/o  anxiety   h/o Chronic GERD  COPD with asthma  Post traumatic stress disorder (PTSD)  HLD (hyperlipidemia)  Hypothyroidism  S/P thyroidectomy        PLAN    cta -ve for pe noted   pulm f/u noted  cont Bronchodilators  Oxygen supp  cont Spiriva  Pfts as OP.   cont zithromax to complete 7 days  echo noted EF 55-60% grade II diastolic dysfunction   cardio f/u noted  Stress test neg noted above  f/u CT in 6mo to Aortic aneurysm.   No b blocker due to low bp.  Obtain records from [place],   cont eliquis for now.  psych cons noted, increase clonazepam to 0.25 mg bid due to increased anxiety. no psych contraindications to discharge.   TSH elevated  f/u endo cons   cont current meds Patient is a 73y old  Male who presents with a chief complaint of Shortness of breath. (05 Aug 2019 11:21)    pt seen in icu [  ], reg med floor [  x ], bed [x  ], chair at bedside [   ], a+o x3 [ x ], lethargic [  ],  nad [x]    still with scrotal swelling and pain      Allergies    Allergy Status Unknown        Vitals    T(F): 97.6 (08-05-19 @ 05:26), Max: 98.5 (08-04-19 @ 20:18)  HR: 55 (08-05-19 @ 05:26) (55 - 55)  BP: 109/59 (08-05-19 @ 05:26) (109/59 - 118/61)  RR: 18 (08-05-19 @ 05:26) (17 - 18)  SpO2: 100% (08-05-19 @ 05:26) (97% - 100%)  Wt(kg): --  CAPILLARY BLOOD GLUCOSE          Labs                          14.5   10.89 )-----------( 161      ( 05 Aug 2019 05:20 )             46.5       08-05    144  |  108  |  17  ----------------------------<  98  4.0   |  28  |  1.12    Ca    8.3<L>      05 Aug 2019 05:20  Phos  2.9     08-05  Mg     2.1     08-05      < from: Nuclear Stress Test-Pharmacologic (08.05.19 @ 03:44) >  IMPRESSIONS:Normal Study  * Negative ECG evidence of ischemia after IV of Lexiscan.  * Reviewof raw data shows: Diaphragmatic artifact.  * There are medium sized, severe defects in  inferior &  inferoapical walls that are predominantly fixed consistent  with diaphragmatic attenuation artifact.  * Gated wall motion analysis is performed, and shows  normal wall motion with post stress LVEF of 52%.      < end of copied text >      Radiology Results            Meds    MEDICATIONS  (STANDING):  ALBUTerol/ipratropium for Nebulization 3 milliLiter(s) Nebulizer every 6 hours  apixaban 5 milliGRAM(s) Oral every 12 hours  azithromycin  IVPB      azithromycin  IVPB 250 milliGRAM(s) IV Intermittent every 24 hours  buDESOnide 160 MICROgram(s)/formoterol 4.5 MICROgram(s) Inhaler 2 Puff(s) Inhalation two times a day  buPROPion XL . 300 milliGRAM(s) Oral daily  citalopram 20 milliGRAM(s) Oral daily  clonazePAM  Tablet 0.25 milliGRAM(s) Oral daily  cyanocobalamin 1000 MICROGram(s) Oral daily  levothyroxine 175 MICROGram(s) Oral daily  melatonin 5 milliGRAM(s) Oral at bedtime  simvastatin 40 milliGRAM(s) Oral at bedtime  tiotropium 18 MICROgram(s) Capsule 1 Capsule(s) Inhalation daily  traZODone 150 milliGRAM(s) Oral daily      MEDICATIONS  (PRN):      Physical Exam      Neuro :  no focal deficits  Respiratory: CTA B/L  CV: RRR, S1S2, no murmurs,   Abdominal: Soft, NT, ND +BS,  Extremities: No edema, + peripheral pulses      ASSESSMENT    Chronic obstructive pulmonary disease with acute exacerbation  dyspnea/orthopnea   PE r/o  anxiety   scrotal edema  h/o Chronic GERD  COPD with asthma  Post traumatic stress disorder (PTSD)  HLD (hyperlipidemia)  Hypothyroidism  S/P thyroidectomy        PLAN    cta -ve for pe noted   pulm f/u noted  cont Bronchodilators  Oxygen supp  cont Spiriva  Pfts as OP.   cont zithromax to complete 7 days  echo noted EF 55-60% grade II diastolic dysfunction   cardio f/u noted  Stress test neg noted above  f/u CT in 6mo to Aortic aneurysm.   No b blocker due to low bp.  Obtain records from [place],   cont eliquis for now.  psych cons noted, increase clonazepam to 0.25 mg bid due to increased anxiety. no psych contraindications to discharge.   TSH elevated  f/u endo cons   f/u scrotal sono  scrotal support  urology cons  cont current meds

## 2019-08-05 NOTE — DISCHARGE NOTE PROVIDER - CARE PROVIDER_API CALL
hortencia sullivan  Phone: (   )    -  Fax: (   )    -  Follow Up Time:     Fiorella Sullivan (DO; MPH)  Internal Medicine; Occupational Medicine; Preventive Medicine  [address] [place] [address]  Phone: [phone]  Fax: [phone]  Follow Up Time: Fiorella Sullivan (DO; MPH)  Internal Medicine; Occupational Medicine; Preventive Medicine  [address] [facility] [address] [place] [address]  Phone: [phone]  Fax: [phone]  Follow Up Time:     hortencia sullivan  Phone: (   )    -  Fax: (   )    -  Follow Up Time:     Ariel Griffin)  Urology  [address] [facility], [address]  [place] [address]  Phone: [phone]  Fax: [phone]  Follow Up Time:

## 2019-08-05 NOTE — PROGRESS NOTE ADULT - SUBJECTIVE AND OBJECTIVE BOX
PGY 1 Note discussed with supervising resident and primary attending    Patient is a 73y old male who presents with a chief complaint of Shortness of breath. (05 Aug 2019 08:49)    INTERVAL HPI/OVERNIGHT EVENTS: Patient seen and examined at the bedside. Patient stated having some shortness of breath over night which was re    MEDICATIONS  (STANDING):  ALBUTerol/ipratropium for Nebulization 3 milliLiter(s) Nebulizer every 6 hours  apixaban 5 milliGRAM(s) Oral every 12 hours  azithromycin  IVPB      azithromycin  IVPB 250 milliGRAM(s) IV Intermittent every 24 hours  buDESOnide 160 MICROgram(s)/formoterol 4.5 MICROgram(s) Inhaler 2 Puff(s) Inhalation two times a day  buPROPion XL . 300 milliGRAM(s) Oral daily  citalopram 20 milliGRAM(s) Oral daily  clonazePAM  Tablet 0.25 milliGRAM(s) Oral daily  cyanocobalamin 1000 MICROGram(s) Oral daily  levothyroxine 175 MICROGram(s) Oral daily  melatonin 5 milliGRAM(s) Oral at bedtime  simvastatin 40 milliGRAM(s) Oral at bedtime  tiotropium 18 MICROgram(s) Capsule 1 Capsule(s) Inhalation daily  traZODone 150 milliGRAM(s) Oral daily    MEDICATIONS  (PRN):      __________________________________________________  REVIEW OF SYSTEMS:    CONSTITUTIONAL: No fever,   EYES: no acute visual disturbances  NECK: No pain or stiffness  RESPIRATORY: No cough; No shortness of breath  CARDIOVASCULAR: No chest pain, no palpitations  GASTROINTESTINAL: No pain. No nausea or vomiting; No diarrhea   NEUROLOGICAL: No headache or numbness, no tremors  MUSCULOSKELETAL: No joint pain, no muscle pain  GENITOURINARY: no dysuria, no frequency, no hesitancy  PSYCHIATRY: no depression , no anxiety  ALL OTHER  ROS negative        Vital Signs Last 24 Hrs  T(C): 36.4 (05 Aug 2019 05:26), Max: 36.9 (04 Aug 2019 20:18)  T(F): 97.6 (05 Aug 2019 05:26), Max: 98.5 (04 Aug 2019 20:18)  HR: 55 (05 Aug 2019 05:26) (50 - 55)  BP: 109/59 (05 Aug 2019 05:26) (105/56 - 118/61)  BP(mean): --  RR: 18 (05 Aug 2019 05:26) (17 - 18)  SpO2: 100% (05 Aug 2019 05:26) (97% - 100%)    ________________________________________________  PHYSICAL EXAM:  GENERAL: NAD  HEENT: Normocephalic;  conjunctivae and sclerae clear; moist mucous membranes;   NECK : supple  CHEST/LUNG: Clear to auscultation bilaterally with good air entry   HEART: S1 S2  regular; no murmurs, gallops or rubs  ABDOMEN: Soft, Nontender, Nondistended; Bowel sounds present  EXTREMITIES: no cyanosis; no edema; no calf tenderness  SKIN: warm and dry; no rash  NERVOUS SYSTEM:  Awake and alert; Oriented  to place, person and time ; no new deficits    _________________________________________________  LABS:                        14.5   10.89 )-----------( 161      ( 05 Aug 2019 05:20 )             46.5     08-05    144  |  108  |  17  ----------------------------<  98  4.0   |  28  |  1.12    Ca    8.3<L>      05 Aug 2019 05:20  Phos  2.9     08-05  Mg     2.1     08-05          CAPILLARY BLOOD GLUCOSE            RADIOLOGY & ADDITIONAL TESTS:    Imaging Personally Reviewed:  YES/NO    Consultant(s) Notes Reviewed:   YES/ No    Care Discussed with Consultants :     Plan of care was discussed with patient and /or primary care giver; all questions and concerns were addressed and care was aligned with patient's wishes. PGY 1 Note discussed with supervising resident and primary attending    Patient is a 73y old male who presents with a chief complaint of Shortness of breath. (05 Aug 2019 08:49)    INTERVAL HPI/OVERNIGHT EVENTS: Patient seen and examined at the bedside. Patient stated having some shortness of breath over night which was relieved with oxygen via nasal cannula. Patient also stated having some scrotal pain this morning which was relieved with tylenol. Currently states that his SOB has improved but still present. Denies any other complaints or concerns. Patient had stress test done today.     MEDICATIONS  (STANDING):  ALBUTerol/ipratropium for Nebulization 3 milliLiter(s) Nebulizer every 6 hours  apixaban 5 milliGRAM(s) Oral every 12 hours  azithromycin  IVPB      azithromycin  IVPB 250 milliGRAM(s) IV Intermittent every 24 hours  buDESOnide 160 MICROgram(s)/formoterol 4.5 MICROgram(s) Inhaler 2 Puff(s) Inhalation two times a day  buPROPion XL . 300 milliGRAM(s) Oral daily  citalopram 20 milliGRAM(s) Oral daily  clonazePAM  Tablet 0.25 milliGRAM(s) Oral daily  cyanocobalamin 1000 MICROGram(s) Oral daily  levothyroxine 175 MICROGram(s) Oral daily  melatonin 5 milliGRAM(s) Oral at bedtime  simvastatin 40 milliGRAM(s) Oral at bedtime  tiotropium 18 MICROgram(s) Capsule 1 Capsule(s) Inhalation daily  traZODone 150 milliGRAM(s) Oral daily    __________________________________________________  REVIEW OF SYSTEMS:    CONSTITUTIONAL: No fever   EYES: no visual disturbances or eye pain  NECK: No pain   RESPIRATORY: No cough; Shortness of breath  CARDIOVASCULAR: No chest pain  GASTROINTESTINAL: No abdominal pain. No nausea or vomiting   NEUROLOGICAL: No headache   MUSCULOSKELETAL: No joint pain, no muscle pain  GENITOURINARY: no dysuria  PSYCHIATRY: no depression  ALL OTHER  ROS negative        Vital Signs Last 24 Hrs  T(C): 36.4 (05 Aug 2019 05:26), Max: 36.9 (04 Aug 2019 20:18)  T(F): 97.6 (05 Aug 2019 05:26), Max: 98.5 (04 Aug 2019 20:18)  HR: 55 (05 Aug 2019 05:26) (50 - 55)  BP: 109/59 (05 Aug 2019 05:26) (105/56 - 118/61)  RR: 18 (05 Aug 2019 05:26) (17 - 18)  SpO2: 100% (05 Aug 2019 05:26) (97% - 100%)    ________________________________________________  PHYSICAL EXAM:  GENERAL: Patient seen in bed and appears to be in mild distress due to SOB  HEENT: Normocephalic; conjunctivae and sclerae clear  NECK : supple  CHEST/LUNG: Clear to auscultation bilaterally   HEART: S1 S2, regular; no murmurs  ABDOMEN: Soft, Nontender, Nondistended; Bowel sounds present; patient has old umbilical hernia which is more pronounced when sitting up  EXTREMITIES: no edema; no calf tenderness  GENITOURINARY: scrotal swelling; no tenderness to palpation, no erythema; no increased warmth   SKIN: warm and dry   NERVOUS SYSTEM:  Awake and alert; Oriented  to place, person and time     _________________________________________________  LABS:                        14.5   10.89 )-----------( 161      ( 05 Aug 2019 05:20 )             46.5     08-05    144  |  108  |  17  ----------------------------<  98  4.0   |  28  |  1.12    Ca    8.3<L>      05 Aug 2019 05:20  Phos  2.9     08-05  Mg     2.1     08-05      RADIOLOGY & ADDITIONAL TESTS:    Imaging Personally Reviewed:  YES     < from: Transthoracic Echocardiogram (08.03.19 @ 06:48) >  CONCLUSIONS:  1. Normal mitral valve. Mild mitral regurgitation.  2. Normal trileaflet aortic valve. Mild aortic  regurgitation.  3. Mild aortic root dilatation, consider CT or chest for  further evaluation.  4. Severely dilated left atrium.  LA volume index = 51  cc/m2.  5. Eccentric left ventricular hypertrophy (dilated left  ventricle with normal relative wall thickness).  6. Normal Left Ventricular Systolic Function,  (EF = 55 to  60%)  7. Grade II diastolic dysfunction.  8. Normal right atrium.  9. Normal right ventricular size and systolic function  (TAPSE  2.1cm).  10. RV systolic pressure is moderately increased at  46 mm  Hg.  11. There is mild tricuspid regurgitation.  12. There is mild pulmonic regurgitation.  13. Trivial pericardial effusion is seen.    < end of copied text >      Consultant(s) Notes Reviewed:   YES     Care Discussed with Consultants :     Plan of care was discussed with patient and /or primary care giver; all questions and concerns were addressed and care was aligned with patient's wishes.

## 2019-08-05 NOTE — DISCHARGE NOTE PROVIDER - PROVIDER TOKENS
FREE:[LAST:[nate],FIRST:[hortencia],PHONE:[(   )    -],FAX:[(   )    -]],PROVIDER:[TOKEN:[[id number]]] PROVIDER:[TOKEN:[[id number]]],FREE:[LAST:[nate],FIRST:[sanchog],PHONE:[(   )    -],FAX:[(   )    -]],PROVIDER:[TOKEN:[[id number]]]

## 2019-08-05 NOTE — PROGRESS NOTE ADULT - PROBLEM SELECTOR PLAN 3
hx of PTSD  - continue home medications  -Dr. Bryant psych consult noted: clonazepam increased to 0.25mg BID due to anxiety; will continue meds

## 2019-08-05 NOTE — PROGRESS NOTE ADULT - SUBJECTIVE AND OBJECTIVE BOX
CHIEF COMPLAINT:Patient is a 73y old  Male who presents with a chief complaint of Shortness of breath. Pt appears comfortable.    	  REVIEW OF SYSTEMS:  CONSTITUTIONAL: No fever, weight loss, or fatigue  EYES: No eye pain, visual disturbances, or discharge  ENT:  No difficulty hearing, tinnitus, vertigo; No sinus or throat pain  NECK: No pain or stiffness  RESPIRATORY: No cough, wheezing, chills or hemoptysis; No Shortness of Breath  CARDIOVASCULAR: No chest pain, palpitations, passing out, dizziness, or leg swelling  GASTROINTESTINAL: No abdominal or epigastric pain. No nausea, vomiting, or hematemesis; No diarrhea or constipation. No melena or hematochezia.  GENITOURINARY: No dysuria, frequency, hematuria, or incontinence  NEUROLOGICAL: No headaches, memory loss, loss of strength, numbness, or tremors  SKIN: No itching, burning, rashes, or lesions   LYMPH Nodes: No enlarged glands  ENDOCRINE: No heat or cold intolerance; No hair loss  MUSCULOSKELETAL: No joint pain or swelling; No muscle, back, or extremity pain  PSYCHIATRIC: No depression, anxiety, mood swings, or difficulty sleeping  HEME/LYMPH: No easy bruising, or bleeding gums  ALLERGY AND IMMUNOLOGIC: No hives or eczema	      PHYSICAL EXAM:  T(C): 36.4 (08-05-19 @ 05:26), Max: 36.9 (08-04-19 @ 20:18)  HR: 55 (08-05-19 @ 05:26) (50 - 63)  BP: 109/59 (08-05-19 @ 05:26) (105/56 - 118/61)  RR: 18 (08-05-19 @ 05:26) (17 - 18)  SpO2: 100% (08-05-19 @ 05:26) (97% - 100%)      Appearance: Normal	  HEENT:   Normal oral mucosa, PERRL, EOMI	  Lymphatic: No lymphadenopathy  Cardiovascular: Normal S1 S2, No JVD, No murmurs, No edema  Respiratory: Lungs clear to auscultation	  Psychiatry: A & O x 3, Mood & affect appropriate  Gastrointestinal:  Soft, Non-tender, + BS	  Skin: No rashes, No ecchymoses, No cyanosis	  Neurologic: Non-focal  Extremities: Normal range of motion, No clubbing, cyanosis or edema  Vascular: Peripheral pulses palpable 2+ bilaterally    MEDICATIONS  (STANDING):  ALBUTerol/ipratropium for Nebulization 3 milliLiter(s) Nebulizer every 6 hours  apixaban 5 milliGRAM(s) Oral every 12 hours  azithromycin  IVPB 250 milliGRAM(s) IV Intermittent every 24 hours  azithromycin  IVPB      buDESOnide 160 MICROgram(s)/formoterol 4.5 MICROgram(s) Inhaler 2 Puff(s) Inhalation two times a day  buPROPion XL . 300 milliGRAM(s) Oral daily  citalopram 20 milliGRAM(s) Oral daily  clonazePAM  Tablet 0.25 milliGRAM(s) Oral daily  cyanocobalamin 1000 MICROGram(s) Oral daily  levothyroxine 175 MICROGram(s) Oral daily  melatonin 5 milliGRAM(s) Oral at bedtime  simvastatin 40 milliGRAM(s) Oral at bedtime  tiotropium 18 MICROgram(s) Capsule 1 Capsule(s) Inhalation daily  traZODone 150 milliGRAM(s) Oral daily        	  LABS:	 	                        14.5   10.89 )-----------( 161      ( 05 Aug 2019 05:20 )             46.5     08-05    144  |  108  |  17  ----------------------------<  98  4.0   |  28  |  1.12    Ca    8.3<L>      05 Aug 2019 05:20  Phos  2.9     08-05  Mg     2.1     08-05      proBNP: Serum Pro-Brain Natriuretic Peptide: 185 pg/mL (08-02 @ 14:29)  Serum Pro-Brain Natriuretic Peptide: 141 pg/mL (07-26 @ 09:09)    Lipid Profile: Cholesterol 121  LDL 59  HDL 29        TSH: Thyroid Stimulating Hormone, Serum: 10.10 uU/mL (08-03 @ 05:58)

## 2019-08-05 NOTE — DISCHARGE NOTE PROVIDER - CARE PROVIDERS DIRECT ADDRESSES
,DirectAddress_Unknown,[email or web address] ,[email or web address],DirectAddress_Unknown,[email or web address]

## 2019-08-06 ENCOUNTER — APPOINTMENT (OUTPATIENT)
Dept: PULMONOLOGY | Facility: CLINIC | Age: 74
End: 2019-08-06

## 2019-08-06 DIAGNOSIS — N50.89 OTHER SPECIFIED DISORDERS OF THE MALE GENITAL ORGANS: ICD-10-CM

## 2019-08-06 PROBLEM — J44.9 CHRONIC OBSTRUCTIVE PULMONARY DISEASE, UNSPECIFIED: Chronic | Status: ACTIVE | Noted: 2019-08-02

## 2019-08-06 PROBLEM — E78.5 HYPERLIPIDEMIA, UNSPECIFIED: Chronic | Status: ACTIVE | Noted: 2019-08-02

## 2019-08-06 PROBLEM — F43.10 POST-TRAUMATIC STRESS DISORDER, UNSPECIFIED: Chronic | Status: ACTIVE | Noted: 2019-08-02

## 2019-08-06 PROBLEM — K21.9 GASTRO-ESOPHAGEAL REFLUX DISEASE WITHOUT ESOPHAGITIS: Chronic | Status: ACTIVE | Noted: 2019-08-02

## 2019-08-06 PROBLEM — Z86.711 PERSONAL HISTORY OF PULMONARY EMBOLISM: Chronic | Status: ACTIVE | Noted: 2019-08-02

## 2019-08-06 LAB
ANION GAP SERPL CALC-SCNC: 1 MMOL/L — LOW (ref 5–17)
BASOPHILS # BLD AUTO: 0.05 K/UL — SIGNIFICANT CHANGE UP (ref 0–0.2)
BASOPHILS NFR BLD AUTO: 0.6 % — SIGNIFICANT CHANGE UP (ref 0–2)
BUN SERPL-MCNC: 18 MG/DL — SIGNIFICANT CHANGE UP (ref 7–18)
CALCIUM SERPL-MCNC: 7.9 MG/DL — LOW (ref 8.4–10.5)
CHLORIDE SERPL-SCNC: 110 MMOL/L — HIGH (ref 96–108)
CO2 SERPL-SCNC: 32 MMOL/L — HIGH (ref 22–31)
CREAT SERPL-MCNC: 1.08 MG/DL — SIGNIFICANT CHANGE UP (ref 0.5–1.3)
EOSINOPHIL # BLD AUTO: 0.46 K/UL — SIGNIFICANT CHANGE UP (ref 0–0.5)
EOSINOPHIL NFR BLD AUTO: 5.2 % — SIGNIFICANT CHANGE UP (ref 0–6)
GLUCOSE SERPL-MCNC: 118 MG/DL — HIGH (ref 70–99)
HCT VFR BLD CALC: 43 % — SIGNIFICANT CHANGE UP (ref 39–50)
HGB BLD-MCNC: 13.7 G/DL — SIGNIFICANT CHANGE UP (ref 13–17)
IMM GRANULOCYTES NFR BLD AUTO: 0.5 % — SIGNIFICANT CHANGE UP (ref 0–1.5)
LYMPHOCYTES # BLD AUTO: 2.79 K/UL — SIGNIFICANT CHANGE UP (ref 1–3.3)
LYMPHOCYTES # BLD AUTO: 31.7 % — SIGNIFICANT CHANGE UP (ref 13–44)
MAGNESIUM SERPL-MCNC: 2.1 MG/DL — SIGNIFICANT CHANGE UP (ref 1.6–2.6)
MCHC RBC-ENTMCNC: 30.4 PG — SIGNIFICANT CHANGE UP (ref 27–34)
MCHC RBC-ENTMCNC: 31.9 GM/DL — LOW (ref 32–36)
MCV RBC AUTO: 95.3 FL — SIGNIFICANT CHANGE UP (ref 80–100)
MONOCYTES # BLD AUTO: 0.81 K/UL — SIGNIFICANT CHANGE UP (ref 0–0.9)
MONOCYTES NFR BLD AUTO: 9.2 % — SIGNIFICANT CHANGE UP (ref 2–14)
NEUTROPHILS # BLD AUTO: 4.64 K/UL — SIGNIFICANT CHANGE UP (ref 1.8–7.4)
NEUTROPHILS NFR BLD AUTO: 52.8 % — SIGNIFICANT CHANGE UP (ref 43–77)
NRBC # BLD: 0 /100 WBCS — SIGNIFICANT CHANGE UP (ref 0–0)
PHOSPHATE SERPL-MCNC: 3.5 MG/DL — SIGNIFICANT CHANGE UP (ref 2.5–4.5)
PLATELET # BLD AUTO: 152 K/UL — SIGNIFICANT CHANGE UP (ref 150–400)
POTASSIUM SERPL-MCNC: 4.5 MMOL/L — SIGNIFICANT CHANGE UP (ref 3.5–5.3)
POTASSIUM SERPL-SCNC: 4.5 MMOL/L — SIGNIFICANT CHANGE UP (ref 3.5–5.3)
RBC # BLD: 4.51 M/UL — SIGNIFICANT CHANGE UP (ref 4.2–5.8)
RBC # FLD: 14.6 % — HIGH (ref 10.3–14.5)
SODIUM SERPL-SCNC: 143 MMOL/L — SIGNIFICANT CHANGE UP (ref 135–145)
WBC # BLD: 8.79 K/UL — SIGNIFICANT CHANGE UP (ref 3.8–10.5)
WBC # FLD AUTO: 8.79 K/UL — SIGNIFICANT CHANGE UP (ref 3.8–10.5)

## 2019-08-06 PROCEDURE — 76870 US EXAM SCROTUM: CPT | Mod: 26

## 2019-08-06 RX ADMIN — Medication 3 MILLILITER(S): at 03:37

## 2019-08-06 RX ADMIN — SIMVASTATIN 40 MILLIGRAM(S): 20 TABLET, FILM COATED ORAL at 21:20

## 2019-08-06 RX ADMIN — Medication 5 MILLIGRAM(S): at 21:20

## 2019-08-06 RX ADMIN — Medication 0.25 MILLIGRAM(S): at 12:16

## 2019-08-06 RX ADMIN — CITALOPRAM 20 MILLIGRAM(S): 10 TABLET, FILM COATED ORAL at 12:18

## 2019-08-06 RX ADMIN — Medication 3 MILLILITER(S): at 21:05

## 2019-08-06 RX ADMIN — Medication 3 MILLILITER(S): at 08:09

## 2019-08-06 RX ADMIN — APIXABAN 5 MILLIGRAM(S): 2.5 TABLET, FILM COATED ORAL at 17:37

## 2019-08-06 RX ADMIN — AZITHROMYCIN 250 MILLIGRAM(S): 500 TABLET, FILM COATED ORAL at 21:20

## 2019-08-06 RX ADMIN — PREGABALIN 1000 MICROGRAM(S): 225 CAPSULE ORAL at 12:17

## 2019-08-06 RX ADMIN — APIXABAN 5 MILLIGRAM(S): 2.5 TABLET, FILM COATED ORAL at 05:11

## 2019-08-06 RX ADMIN — Medication 175 MICROGRAM(S): at 05:11

## 2019-08-06 RX ADMIN — BUPROPION HYDROCHLORIDE 300 MILLIGRAM(S): 150 TABLET, EXTENDED RELEASE ORAL at 12:16

## 2019-08-06 RX ADMIN — Medication 150 MILLIGRAM(S): at 21:20

## 2019-08-06 NOTE — PROGRESS NOTE ADULT - SUBJECTIVE AND OBJECTIVE BOX
PGY 1 Note discussed with supervising resident and primary attending    Patient is a 73y old  Male who presents with a chief complaint of Shortness of breath. (06 Aug 2019 11:35)    INTERVAL HPI/OVERNIGHT EVENTS: Patient seen and examined at the bedside. Patient states having shortness of breath which improved with treatment. Denies any other concerns. Patient to have testicular US done today. No acute events overnight.     MEDICATIONS  (STANDING):  ALBUTerol/ipratropium for Nebulization 3 milliLiter(s) Nebulizer every 6 hours  apixaban 5 milliGRAM(s) Oral every 12 hours  azithromycin  IVPB      azithromycin  IVPB 250 milliGRAM(s) IV Intermittent every 24 hours  buDESOnide 160 MICROgram(s)/formoterol 4.5 MICROgram(s) Inhaler 2 Puff(s) Inhalation two times a day  buPROPion XL . 300 milliGRAM(s) Oral daily  citalopram 20 milliGRAM(s) Oral daily  clonazePAM  Tablet 0.25 milliGRAM(s) Oral daily  cyanocobalamin 1000 MICROGram(s) Oral daily  levothyroxine 175 MICROGram(s) Oral daily  melatonin 5 milliGRAM(s) Oral at bedtime  simvastatin 40 milliGRAM(s) Oral at bedtime  tiotropium 18 MICROgram(s) Capsule 1 Capsule(s) Inhalation daily  traZODone 150 milliGRAM(s) Oral daily    __________________________________________________  REVIEW OF SYSTEMS:    CONSTITUTIONAL: No fever   EYES: no visual disturbances  NECK: No pain   RESPIRATORY: No cough; Shortness of breath  CARDIOVASCULAR: No chest pain  GASTROINTESTINAL: No abdominal pain. No nausea or vomiting   NEUROLOGICAL: No headache  MUSCULOSKELETAL: No joint pain, no muscle pain  GENITOURINARY: no dysuria   ALL OTHER  ROS negative        Vital Signs Last 24 Hrs  T(C): 36.7 (06 Aug 2019 14:45), Max: 36.9 (05 Aug 2019 20:12)  T(F): 98.1 (06 Aug 2019 14:45), Max: 98.4 (05 Aug 2019 20:12)  HR: 57 (06 Aug 2019 14:45) (50 - 67)  BP: 127/57 (06 Aug 2019 14:45) (100/54 - 127/57)  RR: 17 (06 Aug 2019 14:45) (14 - 20)  SpO2: 97% (06 Aug 2019 14:45) (97% - 100%)    ________________________________________________  PHYSICAL EXAM:  GENERAL: Patient is in mild distress due to SOB; receiving treatment which improves symptoms  HEENT: Normocephalic; conjunctivae and sclerae clear   NECK : supple  CHEST/LUNG: Clear to auscultation bilaterally   HEART: S1 S2  regular; no murmurs  ABDOMEN: Soft, Nontender, Nondistended; Bowel sounds present  EXTREMITIES: no edema; no calf tenderness  GENITOURINARY: scrotal edema, nontender to palpation, nonerythematous, no increase in warmth  SKIN: warm and dry   NERVOUS SYSTEM: Awake and alert; Oriented to place, person and time     _________________________________________________  LABS:                        13.7   8.79  )-----------( 152      ( 06 Aug 2019 05:46 )             43.0     08-06    143  |  110<H>  |  18  ----------------------------<  118<H>  4.5   |  32<H>  |  1.08    Ca    7.9<L>      06 Aug 2019 05:46  Phos  3.5     08-06  Mg     2.1     08-06      RADIOLOGY & ADDITIONAL TESTS:    Imaging Personally Reviewed:  YES     No new imaging     Consultant(s) Notes Reviewed:   YES     Care Discussed with Consultants :     Plan of care was discussed with patient and /or primary care giver; all questions and concerns were addressed and care was aligned with patient's wishes.

## 2019-08-06 NOTE — PROGRESS NOTE ADULT - SUBJECTIVE AND OBJECTIVE BOX
CHIEF COMPLAINT:Patient is a 73y old  Male who presents with a chief complaint of Shortness of breath. Pt appears comfortable.    	  REVIEW OF SYSTEMS:  CONSTITUTIONAL: No fever, weight loss, or fatigue  EYES: No eye pain, visual disturbances, or discharge  ENT:  No difficulty hearing, tinnitus, vertigo; No sinus or throat pain  NECK: No pain or stiffness  RESPIRATORY: No cough, wheezing, chills or hemoptysis; No Shortness of Breath  CARDIOVASCULAR: No chest pain, palpitations, passing out, dizziness, or leg swelling  GASTROINTESTINAL: No abdominal or epigastric pain. No nausea, vomiting, or hematemesis; No diarrhea or constipation. No melena or hematochezia.  GENITOURINARY: No dysuria, frequency, hematuria, or incontinence  NEUROLOGICAL: No headaches, memory loss, loss of strength, numbness, or tremors  SKIN: No itching, burning, rashes, or lesions   LYMPH Nodes: No enlarged glands  ENDOCRINE: No heat or cold intolerance; No hair loss  MUSCULOSKELETAL: No joint pain or swelling; No muscle, back, or extremity pain  PSYCHIATRIC: No depression, anxiety, mood swings, or difficulty sleeping  HEME/LYMPH: No easy bruising, or bleeding gums  ALLERGY AND IMMUNOLOGIC: No hives or eczema	      PHYSICAL EXAM:  T(C): 36.4 (08-06-19 @ 07:55), Max: 36.9 (08-05-19 @ 20:12)  HR: 57 (08-06-19 @ 07:55) (50 - 67)  BP: 114/65 (08-06-19 @ 07:55) (100/54 - 116/61)  RR: 20 (08-06-19 @ 07:55) (14 - 20)  SpO2: 100% (08-06-19 @ 07:55) (97% - 100%)      Appearance: Normal	  HEENT:   Normal oral mucosa, PERRL, EOMI	  Lymphatic: No lymphadenopathy  Cardiovascular: Normal S1 S2, No JVD, No murmurs, No edema  Respiratory: Lungs clear to auscultation	  Psychiatry: A & O x 3, Mood & affect appropriate  Gastrointestinal:  Soft, Non-tender, + BS	  Skin: No rashes, No ecchymoses, No cyanosis	  Neurologic: Non-focal  Extremities: Normal range of motion, No clubbing, cyanosis or edema  Vascular: Peripheral pulses palpable 2+ bilaterally    MEDICATIONS  (STANDING):  ALBUTerol/ipratropium for Nebulization 3 milliLiter(s) Nebulizer every 6 hours  apixaban 5 milliGRAM(s) Oral every 12 hours  azithromycin  IVPB      azithromycin  IVPB 250 milliGRAM(s) IV Intermittent every 24 hours  buDESOnide 160 MICROgram(s)/formoterol 4.5 MICROgram(s) Inhaler 2 Puff(s) Inhalation two times a day  buPROPion XL . 300 milliGRAM(s) Oral daily  citalopram 20 milliGRAM(s) Oral daily  clonazePAM  Tablet 0.25 milliGRAM(s) Oral daily  cyanocobalamin 1000 MICROGram(s) Oral daily  levothyroxine 175 MICROGram(s) Oral daily  melatonin 5 milliGRAM(s) Oral at bedtime  simvastatin 40 milliGRAM(s) Oral at bedtime  tiotropium 18 MICROgram(s) Capsule 1 Capsule(s) Inhalation daily  traZODone 150 milliGRAM(s) Oral daily      LABS:	 	                     13.7   8.79  )-----------( 152      ( 06 Aug 2019 05:46 )             43.0     08-06    143  |  110<H>  |  18  ----------------------------<  118<H>  4.5   |  32<H>  |  1.08    Ca    7.9<L>      06 Aug 2019 05:46  Phos  3.5     08-06  Mg     2.1     08-06      proBNP: Serum Pro-Brain Natriuretic Peptide: 185 pg/mL (08-02 @ 14:29)  Serum Pro-Brain Natriuretic Peptide: 141 pg/mL (07-26 @ 09:09)    Lipid Profile: Cholesterol 121  LDL 59  HDL 29        TSH: Thyroid Stimulating Hormone, Serum: 10.10 uU/mL (08-03 @ 05:58)      	    IMPRESSIONS:Normal Study  * Negative ECG evidence of ischemia after IV of Lexiscan.  * Reviewof raw data shows: Diaphragmatic artifact.  * There are medium sized, severe defects in  inferior &  inferoapical walls that are predominantly fixed consistent  with diaphragmatic attenuation artifact.  * Gated wall motion analysis is performed, and shows  normal wall motion with post stress LVEF of 52%.

## 2019-08-06 NOTE — PROGRESS NOTE ADULT - SUBJECTIVE AND OBJECTIVE BOX
Pt is awake, alert, sitting in bed in NAD. Still with scrotal edema; given scrotal sling. Had stress test - negative. Reports he feels better.     INTERVAL HPI/OVERNIGHT EVENTS:      VITAL SIGNS:  T(F): 97.6 (08-06-19 @ 07:55)  HR: 57 (08-06-19 @ 07:55)  BP: 114/65 (08-06-19 @ 07:55)  RR: 20 (08-06-19 @ 07:55)  SpO2: 100% (08-06-19 @ 07:55)  Wt(kg): --  I&O's Detail          REVIEW OF SYSTEMS:    CONSTITUTIONAL:  No fevers, chills, sweats    HEENT:  Eyes:  No diplopia or blurred vision. ENT:  No earache, sore throat or runny nose.    CARDIOVASCULAR:  No pressure, squeezing, tightness, or heaviness about the chest; no palpitations.    RESPIRATORY:  Per HPI    GASTROINTESTINAL:  No abdominal pain, nausea, vomiting or diarrhea.    GENITOURINARY:  No dysuria, frequency or urgency.    NEUROLOGIC:  No paresthesias, fasciculations, seizures or weakness.    PSYCHIATRIC:  No disorder of thought or mood.      PHYSICAL EXAM:    Constitutional: Well developed and nourished  Eyes:Perrla  ENMT: normal  Neck:supple  Respiratory: good air entry  Cardiovascular: S1 S2 regular  Gastrointestinal: Soft, Non tender  Extremities: No edema  Vascular:normal  Neurological:Awake, alert,Ox3  Musculoskeletal:Normal  : Scrotal edema.     MEDICATIONS  (STANDING):  ALBUTerol/ipratropium for Nebulization 3 milliLiter(s) Nebulizer every 6 hours  apixaban 5 milliGRAM(s) Oral every 12 hours  azithromycin  IVPB      azithromycin  IVPB 250 milliGRAM(s) IV Intermittent every 24 hours  buDESOnide 160 MICROgram(s)/formoterol 4.5 MICROgram(s) Inhaler 2 Puff(s) Inhalation two times a day  buPROPion XL . 300 milliGRAM(s) Oral daily  citalopram 20 milliGRAM(s) Oral daily  clonazePAM  Tablet 0.25 milliGRAM(s) Oral daily  cyanocobalamin 1000 MICROGram(s) Oral daily  levothyroxine 175 MICROGram(s) Oral daily  melatonin 5 milliGRAM(s) Oral at bedtime  simvastatin 40 milliGRAM(s) Oral at bedtime  tiotropium 18 MICROgram(s) Capsule 1 Capsule(s) Inhalation daily  traZODone 150 milliGRAM(s) Oral daily    MEDICATIONS  (PRN):      Allergies    Allergy Status Unknown    Intolerances        LABS:                        13.7   8.79  )-----------( 152      ( 06 Aug 2019 05:46 )             43.0     08-06    143  |  110<H>  |  18  ----------------------------<  118<H>  4.5   |  32<H>  |  1.08    Ca    7.9<L>      06 Aug 2019 05:46  Phos  3.5     08-06  Mg     2.1     08-06                CAPILLARY BLOOD GLUCOSE        pro-bnp 185 08-02 @ 14:29     d-dimer --  08-02 @ 14:29      RADIOLOGY & ADDITIONAL TESTS:  < from: Nuclear Stress Test-Pharmacologic (08.05.19 @ 03:44) >  IMPRESSIONS:Normal Study  * Negative ECG evidence of ischemia after IV of Lexiscan.  * Reviewof raw data shows: Diaphragmatic artifact.  * There are medium sized, severe defects in  inferior &  inferoapical walls that are predominantly fixed consistent  with diaphragmatic attenuation artifact.  * Gated wall motion analysis is performed, and shows  normal wall motion with post stress LVEF of 52%.    < end of copied text >    CXR:    Ct scan chest:    ekg;    echo:

## 2019-08-06 NOTE — PROGRESS NOTE ADULT - PROBLEM SELECTOR PLAN 2
COPD exacerbation vs Acute CHF vs panic attack (as pt has PTSD) or from PE it self as pt was recently diagnosed with PE  pt was not taking eliquis at home as he did not have medications   CTA done negative for PE  Echo showed grade 2 diastolic dysfunction, EF of 55-60%   dilated aorta of 4.1cm noted on CTA; patient can f/u as outpatient  -f/u cardio Dr. Brumfield  -stress test negative for ischemia  -continue eliquis

## 2019-08-06 NOTE — PROGRESS NOTE ADULT - SUBJECTIVE AND OBJECTIVE BOX
Patient is a 73y old  Male who presents with a chief complaint of Shortness of breath. (05 Aug 2019 17:22)    pt seen in icu [  ], reg med floor [  x ], bed [x  ], chair at bedside [   ], a+o x3 [ x ], lethargic [  ],  nad [x]    still with scrotal swelling and pain      Allergies    Allergy Status Unknown        Vitals    T(F): 97.6 (08-06-19 @ 07:55), Max: 98.4 (08-05-19 @ 20:12)  HR: 57 (08-06-19 @ 07:55) (50 - 67)  BP: 114/65 (08-06-19 @ 07:55) (100/54 - 116/61)  RR: 20 (08-06-19 @ 07:55) (14 - 20)  SpO2: 100% (08-06-19 @ 07:55) (97% - 100%)  Wt(kg): --  CAPILLARY BLOOD GLUCOSE          Labs                          13.7   8.79  )-----------( 152      ( 06 Aug 2019 05:46 )             43.0       08-06    143  |  110<H>  |  18  ----------------------------<  118<H>  4.5   |  32<H>  |  1.08    Ca    7.9<L>      06 Aug 2019 05:46  Phos  3.5     08-06  Mg     2.1     08-06                  Radiology Results          Meds    MEDICATIONS  (STANDING):  ALBUTerol/ipratropium for Nebulization 3 milliLiter(s) Nebulizer every 6 hours  apixaban 5 milliGRAM(s) Oral every 12 hours  azithromycin  IVPB      azithromycin  IVPB 250 milliGRAM(s) IV Intermittent every 24 hours  buDESOnide 160 MICROgram(s)/formoterol 4.5 MICROgram(s) Inhaler 2 Puff(s) Inhalation two times a day  buPROPion XL . 300 milliGRAM(s) Oral daily  citalopram 20 milliGRAM(s) Oral daily  clonazePAM  Tablet 0.25 milliGRAM(s) Oral daily  cyanocobalamin 1000 MICROGram(s) Oral daily  levothyroxine 175 MICROGram(s) Oral daily  melatonin 5 milliGRAM(s) Oral at bedtime  simvastatin 40 milliGRAM(s) Oral at bedtime  tiotropium 18 MICROgram(s) Capsule 1 Capsule(s) Inhalation daily  traZODone 150 milliGRAM(s) Oral daily      MEDICATIONS  (PRN):      Physical Exam      Neuro :  no focal deficits  Respiratory: CTA B/L  CV: RRR, S1S2, no murmurs,   Abdominal: Soft, NT, ND +BS,  Extremities: No edema, + peripheral pulses      ASSESSMENT    Chronic obstructive pulmonary disease with acute exacerbation  dyspnea/orthopnea   PE r/o  anxiety   scrotal edema  h/o Chronic GERD  COPD with asthma  Post traumatic stress disorder (PTSD)  HLD (hyperlipidemia)  Hypothyroidism  S/P thyroidectomy        PLAN    cta -ve for pe noted   pulm f/u noted  cont Bronchodilators  Oxygen supp  cont Spiriva  Pfts as OP.   cont zithromax to complete 7 days  echo noted EF 55-60% grade II diastolic dysfunction   cardio f/u noted  Stress test neg noted above  f/u CT in 6mo to Aortic aneurysm.   No b blocker due to low bp.  Obtain records from [place],   cont eliquis for now.  psych cons noted, increase clonazepam to 0.25 mg bid due to increased anxiety. no psych contraindications to discharge.   TSH elevated  endo cons   f/u scrotal sono  scrotal support  urology cons  cont current meds

## 2019-08-07 DIAGNOSIS — E03.9 HYPOTHYROIDISM, UNSPECIFIED: ICD-10-CM

## 2019-08-07 LAB
ANION GAP SERPL CALC-SCNC: 7 MMOL/L — SIGNIFICANT CHANGE UP (ref 5–17)
APPEARANCE UR: CLEAR — SIGNIFICANT CHANGE UP
BASOPHILS # BLD AUTO: 0.05 K/UL — SIGNIFICANT CHANGE UP (ref 0–0.2)
BASOPHILS NFR BLD AUTO: 0.5 % — SIGNIFICANT CHANGE UP (ref 0–2)
BILIRUB UR-MCNC: NEGATIVE — SIGNIFICANT CHANGE UP
BUN SERPL-MCNC: 19 MG/DL — HIGH (ref 7–18)
CALCIUM SERPL-MCNC: 8.7 MG/DL — SIGNIFICANT CHANGE UP (ref 8.4–10.5)
CHLORIDE SERPL-SCNC: 109 MMOL/L — HIGH (ref 96–108)
CO2 SERPL-SCNC: 28 MMOL/L — SIGNIFICANT CHANGE UP (ref 22–31)
COLOR SPEC: YELLOW — SIGNIFICANT CHANGE UP
CREAT SERPL-MCNC: 1.27 MG/DL — SIGNIFICANT CHANGE UP (ref 0.5–1.3)
DIFF PNL FLD: NEGATIVE — SIGNIFICANT CHANGE UP
EOSINOPHIL # BLD AUTO: 0.46 K/UL — SIGNIFICANT CHANGE UP (ref 0–0.5)
EOSINOPHIL NFR BLD AUTO: 4.9 % — SIGNIFICANT CHANGE UP (ref 0–6)
GLUCOSE SERPL-MCNC: 96 MG/DL — SIGNIFICANT CHANGE UP (ref 70–99)
GLUCOSE UR QL: NEGATIVE — SIGNIFICANT CHANGE UP
HCT VFR BLD CALC: 45.9 % — SIGNIFICANT CHANGE UP (ref 39–50)
HGB BLD-MCNC: 14.2 G/DL — SIGNIFICANT CHANGE UP (ref 13–17)
IMM GRANULOCYTES NFR BLD AUTO: 0.2 % — SIGNIFICANT CHANGE UP (ref 0–1.5)
KETONES UR-MCNC: NEGATIVE — SIGNIFICANT CHANGE UP
LEUKOCYTE ESTERASE UR-ACNC: NEGATIVE — SIGNIFICANT CHANGE UP
LYMPHOCYTES # BLD AUTO: 3.11 K/UL — SIGNIFICANT CHANGE UP (ref 1–3.3)
LYMPHOCYTES # BLD AUTO: 33.2 % — SIGNIFICANT CHANGE UP (ref 13–44)
MAGNESIUM SERPL-MCNC: 2.2 MG/DL — SIGNIFICANT CHANGE UP (ref 1.6–2.6)
MCHC RBC-ENTMCNC: 30.1 PG — SIGNIFICANT CHANGE UP (ref 27–34)
MCHC RBC-ENTMCNC: 30.9 GM/DL — LOW (ref 32–36)
MCV RBC AUTO: 97.5 FL — SIGNIFICANT CHANGE UP (ref 80–100)
MONOCYTES # BLD AUTO: 0.83 K/UL — SIGNIFICANT CHANGE UP (ref 0–0.9)
MONOCYTES NFR BLD AUTO: 8.8 % — SIGNIFICANT CHANGE UP (ref 2–14)
NEUTROPHILS # BLD AUTO: 4.91 K/UL — SIGNIFICANT CHANGE UP (ref 1.8–7.4)
NEUTROPHILS NFR BLD AUTO: 52.4 % — SIGNIFICANT CHANGE UP (ref 43–77)
NITRITE UR-MCNC: NEGATIVE — SIGNIFICANT CHANGE UP
NRBC # BLD: 0 /100 WBCS — SIGNIFICANT CHANGE UP (ref 0–0)
PH UR: 6 — SIGNIFICANT CHANGE UP (ref 5–8)
PHOSPHATE SERPL-MCNC: 3.9 MG/DL — SIGNIFICANT CHANGE UP (ref 2.5–4.5)
PLATELET # BLD AUTO: 160 K/UL — SIGNIFICANT CHANGE UP (ref 150–400)
POTASSIUM SERPL-MCNC: 4.6 MMOL/L — SIGNIFICANT CHANGE UP (ref 3.5–5.3)
POTASSIUM SERPL-SCNC: 4.6 MMOL/L — SIGNIFICANT CHANGE UP (ref 3.5–5.3)
PROT UR-MCNC: NEGATIVE — SIGNIFICANT CHANGE UP
RBC # BLD: 4.71 M/UL — SIGNIFICANT CHANGE UP (ref 4.2–5.8)
RBC # FLD: 14.6 % — HIGH (ref 10.3–14.5)
SODIUM SERPL-SCNC: 144 MMOL/L — SIGNIFICANT CHANGE UP (ref 135–145)
SP GR SPEC: 1.02 — SIGNIFICANT CHANGE UP (ref 1.01–1.02)
T4 FREE SERPL-MCNC: 1.5 NG/DL — SIGNIFICANT CHANGE UP (ref 0.9–1.8)
TSH SERPL-MCNC: 7.27 UU/ML — HIGH (ref 0.34–4.82)
UROBILINOGEN FLD QL: NEGATIVE — SIGNIFICANT CHANGE UP
WBC # BLD: 9.38 K/UL — SIGNIFICANT CHANGE UP (ref 3.8–10.5)
WBC # FLD AUTO: 9.38 K/UL — SIGNIFICANT CHANGE UP (ref 3.8–10.5)

## 2019-08-07 RX ADMIN — Medication 175 MICROGRAM(S): at 05:23

## 2019-08-07 RX ADMIN — PREGABALIN 1000 MICROGRAM(S): 225 CAPSULE ORAL at 12:05

## 2019-08-07 RX ADMIN — Medication 3 MILLILITER(S): at 09:20

## 2019-08-07 RX ADMIN — Medication 5 MILLIGRAM(S): at 21:02

## 2019-08-07 RX ADMIN — CITALOPRAM 20 MILLIGRAM(S): 10 TABLET, FILM COATED ORAL at 12:05

## 2019-08-07 RX ADMIN — Medication 0.25 MILLIGRAM(S): at 12:04

## 2019-08-07 RX ADMIN — APIXABAN 5 MILLIGRAM(S): 2.5 TABLET, FILM COATED ORAL at 18:07

## 2019-08-07 RX ADMIN — Medication 3 MILLILITER(S): at 14:55

## 2019-08-07 RX ADMIN — Medication 3 MILLILITER(S): at 03:34

## 2019-08-07 RX ADMIN — Medication 3 MILLILITER(S): at 20:20

## 2019-08-07 RX ADMIN — SIMVASTATIN 40 MILLIGRAM(S): 20 TABLET, FILM COATED ORAL at 21:02

## 2019-08-07 RX ADMIN — Medication 150 MILLIGRAM(S): at 21:02

## 2019-08-07 RX ADMIN — BUPROPION HYDROCHLORIDE 300 MILLIGRAM(S): 150 TABLET, EXTENDED RELEASE ORAL at 12:05

## 2019-08-07 RX ADMIN — APIXABAN 5 MILLIGRAM(S): 2.5 TABLET, FILM COATED ORAL at 05:23

## 2019-08-07 NOTE — PROGRESS NOTE ADULT - PROBLEM SELECTOR PLAN 1
-states SOB is improving but still present  - At home on fluticasone nasal spray  - continue bronchodilators and zithromax for copd exacerbation vs bronchitis   - hx of smoking  - give NC 2L if SO2 <92%  - pulmonary consulted noted- Dr. Thrasher; continue to f/u -states SOB is improving but still present  - At home on fluticasone nasal spray  - continue bronchodilators and zithromax for copd exacerbation vs bronchitis   - zithromax for total 7 days  - hx of smoking  - give NC 2L if SO2 <92%  - pulmonary consulted noted- Dr. Thrasher; continue to f/u

## 2019-08-07 NOTE — DIETITIAN INITIAL EVALUATION ADULT. - PROBLEM SELECTOR PLAN 1
- At home on fluticasone nasal spray  - start on bronchodilators and zithromax for copd exacerbation vs  bronchitis   - hx of smoking  - ABG shows alkalosis  - give NC 2L if SO2 <92%  - pulmonary consulted- Dr. Thrasher

## 2019-08-07 NOTE — PROGRESS NOTE ADULT - PROBLEM SELECTOR PLAN 5
- hgb 13.7 today  - continue to f/u cbc hx of PTSD  - continue home medications  -Dr. Bryant psych consult noted: clonazepam increased to 0.25mg BID due to anxiety; will continue meds

## 2019-08-07 NOTE — PROGRESS NOTE ADULT - PROBLEM SELECTOR PLAN 6
- pt on citalopram, trazodone, buproprion, clonazepam  - continue home meds - hgb 13.7 today  - continue to f/u cbc - hgb 14.2 today  - continue to f/u cbc

## 2019-08-07 NOTE — PROGRESS NOTE ADULT - PROBLEM SELECTOR PLAN 1
Bronchodilators  Steroids  Oxygen supp  Echo noted.   Stress test negative.   F/u CXR  Spiriva  Pfts as OP.

## 2019-08-07 NOTE — CONSULT NOTE ADULT - SUBJECTIVE AND OBJECTIVE BOX
Patient is a 73y old  Male who presents with a chief complaint of Shortness of breath. (07 Aug 2019 08:05)      HPI:  74 yo male exsmoker with COPD, hypothyroidism, HLD, PTSD and hx of recent PE s/p 1 week of eliquis, came with complains of difficulty breathing. Pt states his SOB started a month ago and he has been admitted to the hospital 3 times in the past month. He states he has SOB with exertion, lying flat in bed, and wakes up gasping for air. He can walk up to 3 blocks before getting SOB. He says he has lower ext edema. 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 Pt was admitted to [facility] 3 weeks ago and was diagnosed with PE. He was discharged on 7 days of Eliquis and didn't follow up with a doctor about this. Pt doesn't remember how he was this morning but had an appointment with his PCP who heard about his recent complains and told him to seek immediate attention at the ED. He said he was feeling SOB, dizzy, blurry vision his heart was racing, he felt weak and thought he was going to past out. Pt has a dry cough, no hemoptysis, he has lost 40lbs, doesn't have anorexia denies CP, headache, fevers, or chills. (02 Aug 2019 18:58)      PAST MEDICAL & SURGICAL HISTORY:  Chronic GERD  COPD with asthma  Post traumatic stress disorder (PTSD)  HLD (hyperlipidemia)  History of pulmonary embolus (PE)  Hypothyroidism  Anxiety  S/P thyroidectomy         MEDICATIONS  (STANDING):  ALBUTerol/ipratropium for Nebulization 3 milliLiter(s) Nebulizer every 6 hours  apixaban 5 milliGRAM(s) Oral every 12 hours  buDESOnide 160 MICROgram(s)/formoterol 4.5 MICROgram(s) Inhaler 2 Puff(s) Inhalation two times a day  buPROPion XL . 300 milliGRAM(s) Oral daily  citalopram 20 milliGRAM(s) Oral daily  clonazePAM  Tablet 0.25 milliGRAM(s) Oral daily  cyanocobalamin 1000 MICROGram(s) Oral daily  levothyroxine 175 MICROGram(s) Oral daily  melatonin 5 milliGRAM(s) Oral at bedtime  simvastatin 40 milliGRAM(s) Oral at bedtime  tiotropium 18 MICROgram(s) Capsule 1 Capsule(s) Inhalation daily  traZODone 150 milliGRAM(s) Oral daily    MEDICATIONS  (PRN):      FAMILY HISTORY:  FH: Alzheimers disease      SOCIAL HISTORY:      REVIEW OF SYSTEMS:  CONSTITUTIONAL: No fever, weight loss, or fatigue  EYES: No eye pain, visual disturbances, or discharge  ENT:  No difficulty hearing, tinnitus, vertigo; No sinus or throat pain  NECK: No pain or stiffness  RESPIRATORY: No cough, wheezing, chills or hemoptysis; No Shortness of Breath  CARDIOVASCULAR: No chest pain, palpitations, passing out, dizziness, or leg swelling  GASTROINTESTINAL: No abdominal or epigastric pain. No nausea, vomiting, or hematemesis; No diarrhea or constipation. No melena or hematochezia.  GENITOURINARY: No dysuria, frequency, hematuria, or incontinence  NEUROLOGICAL: No headaches, memory loss, loss of strength, numbness, or tremors  SKIN: No itching, burning, rashes, or lesions   LYMPH Nodes: No enlarged glands  ENDOCRINE: No heat or cold intolerance; No hair loss  MUSCULOSKELETAL: No joint pain or swelling; No muscle, back, or extremity pain  PSYCHIATRIC: No depression, anxiety, mood swings, or difficulty sleeping  HEME/LYMPH: No easy bruising, or bleeding gums  ALLERGY AND IMMUNOLOGIC: No hives or eczema	        Vital Signs Last 24 Hrs  T(C): 36.2 (07 Aug 2019 05:19), Max: 36.9 (06 Aug 2019 20:20)  T(F): 97.2 (07 Aug 2019 05:19), Max: 98.5 (06 Aug 2019 20:20)  HR: 51 (07 Aug 2019 05:19) (51 - 58)  BP: 99/55 (07 Aug 2019 05:19) (99/55 - 127/57)  BP(mean): --  RR: 16 (07 Aug 2019 05:19) (16 - 18)  SpO2: 98% (07 Aug 2019 05:19) (97% - 98%)      Constitutional:    NC/AT:    HEENT:    Neck:  No JVD, bruits or thyromegaly    Respiratory:  Clear without rales or rhonchi    Cardiovascular:  RR without murmur, rub or gallop.    Gastrointestinal: Soft without hepatosplenomegaly.    Extremities: without cyanosis, clubbing or edema.    Neurological:  Oriented   x      . No gross sensory or motor defects.        LABS:                        14.2   9.38  )-----------( 160      ( 07 Aug 2019 07:03 )             45.9     08-07    144  |  109<H>  |  19<H>  ----------------------------<  96  4.6   |  28  |  1.27    Ca    8.7      07 Aug 2019 07:03  Phos  3.9     08-07  Mg     2.2     08-07              CAPILLARY BLOOD GLUCOSE          RADIOLOGY & ADDITIONAL STUDIES: Patient is a 73y old  Male who presents with a chief complaint of Shortness of breath. (07 Aug 2019 08:05)      HPI:  74 yo male exsmoker with COPD, hypothyroidism, HLD, PTSD and hx of recent PE s/p 1 week of eliquis, came with complains of difficulty breathing. Pt states his SOB started a month ago and he has been admitted to the hospital 3 times in the past month. He states he has SOB with exertion, lying flat in bed, and wakes up gasping for air. He can walk up to 3 blocks before getting SOB. He says he has lower ext edema. 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 Pt was admitted to [facility] 3 weeks ago and was diagnosed with PE. He was discharged on 7 days of Eliquis and didn't follow up with a doctor about this. Pt doesn't remember how he was this morning but had an appointment with his PCP who heard about his recent complains and told him to seek immediate attention at the ED. He said he was feeling SOB, dizzy, blurry vision his heart was racing, he felt weak and thought he was going to past out. Pt has a dry cough, no hemoptysis, he has lost 40lbs, doesn't have anorexia denies CP, headache, fevers, or chills. (02 Aug 2019 18:58)  Found to have high TSH. Pt admits to skipping lt4 about once a week. Takes vitamins with lt4.    PAST MEDICAL & SURGICAL HISTORY:  Chronic GERD  COPD with asthma  Post traumatic stress disorder (PTSD)  HLD (hyperlipidemia)  History of pulmonary embolus (PE)  Hypothyroidism  Anxiety  S/P thyroidectomy         MEDICATIONS  (STANDING):  ALBUTerol/ipratropium for Nebulization 3 milliLiter(s) Nebulizer every 6 hours  apixaban 5 milliGRAM(s) Oral every 12 hours  buDESOnide 160 MICROgram(s)/formoterol 4.5 MICROgram(s) Inhaler 2 Puff(s) Inhalation two times a day  buPROPion XL . 300 milliGRAM(s) Oral daily  citalopram 20 milliGRAM(s) Oral daily  clonazePAM  Tablet 0.25 milliGRAM(s) Oral daily  cyanocobalamin 1000 MICROGram(s) Oral daily  levothyroxine 175 MICROGram(s) Oral daily  melatonin 5 milliGRAM(s) Oral at bedtime  simvastatin 40 milliGRAM(s) Oral at bedtime  tiotropium 18 MICROgram(s) Capsule 1 Capsule(s) Inhalation daily  traZODone 150 milliGRAM(s) Oral daily    MEDICATIONS  (PRN):      FAMILY HISTORY:  FH: Alzheimers disease      SOCIAL HISTORY:      REVIEW OF SYSTEMS:  CONSTITUTIONAL: No fever, weight loss, or fatigue  EYES: No eye pain, visual disturbances, or discharge  ENT:  No difficulty hearing, tinnitus, vertigo; No sinus or throat pain  NECK: No pain or stiffness  RESPIRATORY: No cough, wheezing, chills or hemoptysis; No Shortness of Breath  CARDIOVASCULAR: No chest pain, palpitations, passing out, dizziness, or leg swelling  GASTROINTESTINAL: No abdominal or epigastric pain. No nausea, vomiting, or hematemesis; No diarrhea or constipation. No melena or hematochezia.  GENITOURINARY: No dysuria, frequency, hematuria, or incontinence  NEUROLOGICAL: No headaches, memory loss, loss of strength, numbness, or tremors  SKIN: No itching, burning, rashes, or lesions   LYMPH Nodes: No enlarged glands  ENDOCRINE: No heat or cold intolerance; No hair loss  MUSCULOSKELETAL: No joint pain or swelling; No muscle, back, or extremity pain  PSYCHIATRIC: No depression, anxiety, mood swings, or difficulty sleeping  HEME/LYMPH: No easy bruising, or bleeding gums  ALLERGY AND IMMUNOLOGIC: No hives or eczema	        Vital Signs Last 24 Hrs  T(C): 36.2 (07 Aug 2019 05:19), Max: 36.9 (06 Aug 2019 20:20)  T(F): 97.2 (07 Aug 2019 05:19), Max: 98.5 (06 Aug 2019 20:20)  HR: 51 (07 Aug 2019 05:19) (51 - 58)  BP: 99/55 (07 Aug 2019 05:19) (99/55 - 127/57)  BP(mean): --  RR: 16 (07 Aug 2019 05:19) (16 - 18)  SpO2: 98% (07 Aug 2019 05:19) (97% - 98%)      Constitutional:    HEENT: nad    Neck:  No JVD, bruits or thyromegaly    Respiratory:  Clear without rales or rhonchi    Cardiovascular:  RR without murmur, rub or gallop.    Gastrointestinal: Soft without hepatosplenomegaly.    Extremities: without cyanosis, clubbing or edema.    Neurological:  Oriented   x  3    . No gross sensory or motor defects.        LABS:                        14.2   9.38  )-----------( 160      ( 07 Aug 2019 07:03 )             45.9     08-07    144  |  109<H>  |  19<H>  ----------------------------<  96  4.6   |  28  |  1.27    Ca    8.7      07 Aug 2019 07:03  Phos  3.9     08-07  Mg     2.2     08-07        Thyroid Stimulating Hormone, Serum (08.03.19 @ 05:58)    Thyroid Stimulating Hormone, Serum: 10.10 uU/mL          CAPILLARY BLOOD GLUCOSE          RADIOLOGY & ADDITIONAL STUDIES:

## 2019-08-07 NOTE — PROGRESS NOTE ADULT - SUBJECTIVE AND OBJECTIVE BOX
PGY 1 Note discussed with supervising resident and primary attending    Patient is a 73y old  Male who presents with a chief complaint of Shortness of breath. (07 Aug 2019 11:48)    INTERVAL HPI/OVERNIGHT EVENTS: Patient seen and examined at the bedside.     MEDICATIONS  (STANDING):  ALBUTerol/ipratropium for Nebulization 3 milliLiter(s) Nebulizer every 6 hours  apixaban 5 milliGRAM(s) Oral every 12 hours  buDESOnide 160 MICROgram(s)/formoterol 4.5 MICROgram(s) Inhaler 2 Puff(s) Inhalation two times a day  buPROPion XL . 300 milliGRAM(s) Oral daily  citalopram 20 milliGRAM(s) Oral daily  clonazePAM  Tablet 0.25 milliGRAM(s) Oral daily  cyanocobalamin 1000 MICROGram(s) Oral daily  levothyroxine 175 MICROGram(s) Oral daily  melatonin 5 milliGRAM(s) Oral at bedtime  simvastatin 40 milliGRAM(s) Oral at bedtime  tiotropium 18 MICROgram(s) Capsule 1 Capsule(s) Inhalation daily  traZODone 150 milliGRAM(s) Oral daily    MEDICATIONS  (PRN):      __________________________________________________  REVIEW OF SYSTEMS:    CONSTITUTIONAL: No fever,   EYES: no acute visual disturbances  NECK: No pain or stiffness  RESPIRATORY: No cough; No shortness of breath  CARDIOVASCULAR: No chest pain, no palpitations  GASTROINTESTINAL: No pain. No nausea or vomiting; No diarrhea   NEUROLOGICAL: No headache or numbness, no tremors  MUSCULOSKELETAL: No joint pain, no muscle pain  GENITOURINARY: no dysuria, no frequency, no hesitancy  PSYCHIATRY: no depression , no anxiety  ALL OTHER  ROS negative        Vital Signs Last 24 Hrs  T(C): 36.6 (07 Aug 2019 12:21), Max: 36.9 (06 Aug 2019 20:20)  T(F): 97.8 (07 Aug 2019 12:21), Max: 98.5 (06 Aug 2019 20:20)  HR: 56 (07 Aug 2019 12:21) (51 - 59)  BP: 121/57 (07 Aug 2019 12:21) (99/55 - 127/57)  BP(mean): --  RR: 17 (07 Aug 2019 12:21) (16 - 18)  SpO2: 99% (07 Aug 2019 12:21) (97% - 99%)    ________________________________________________  PHYSICAL EXAM:  GENERAL: NAD  HEENT: Normocephalic;  conjunctivae and sclerae clear; moist mucous membranes;   NECK : supple  CHEST/LUNG: Clear to auscultation bilaterally with good air entry   HEART: S1 S2  regular; no murmurs, gallops or rubs  ABDOMEN: Soft, Nontender, Nondistended; Bowel sounds present  EXTREMITIES: no cyanosis; no edema; no calf tenderness  SKIN: warm and dry; no rash  NERVOUS SYSTEM:  Awake and alert; Oriented  to place, person and time ; no new deficits    _________________________________________________  LABS:                        14.2   9.38  )-----------( 160      ( 07 Aug 2019 07:03 )             45.9     08-07    144  |  109<H>  |  19<H>  ----------------------------<  96  4.6   |  28  |  1.27    Ca    8.7      07 Aug 2019 07:03  Phos  3.9     08-07  Mg     2.2     08-07          CAPILLARY BLOOD GLUCOSE            RADIOLOGY & ADDITIONAL TESTS:    Imaging Personally Reviewed:  YES/NO    Consultant(s) Notes Reviewed:   YES/ No    Care Discussed with Consultants :     Plan of care was discussed with patient and /or primary care giver; all questions and concerns were addressed and care was aligned with patient's wishes. PGY 1 Note discussed with supervising resident and primary attending    Patient is a 73y old male who presents with a chief complaint of Shortness of breath. (07 Aug 2019 11:48)    INTERVAL HPI/OVERNIGHT EVENTS: Patient seen and examined at the bedside. Patient states he feels fine. Complains of some shortness of breath but is improved with oxygen. Patient states that he has some mild discomfort due to scrotal swelling. No other complaints or concerns. No acute events overnight.     MEDICATIONS  (STANDING):  ALBUTerol/ipratropium for Nebulization 3 milliLiter(s) Nebulizer every 6 hours  apixaban 5 milliGRAM(s) Oral every 12 hours  buDESOnide 160 MICROgram(s)/formoterol 4.5 MICROgram(s) Inhaler 2 Puff(s) Inhalation two times a day  buPROPion XL . 300 milliGRAM(s) Oral daily  citalopram 20 milliGRAM(s) Oral daily  clonazePAM  Tablet 0.25 milliGRAM(s) Oral daily  cyanocobalamin 1000 MICROGram(s) Oral daily  levothyroxine 175 MICROGram(s) Oral daily  melatonin 5 milliGRAM(s) Oral at bedtime  simvastatin 40 milliGRAM(s) Oral at bedtime  tiotropium 18 MICROgram(s) Capsule 1 Capsule(s) Inhalation daily  traZODone 150 milliGRAM(s) Oral daily    __________________________________________________  REVIEW OF SYSTEMS:    CONSTITUTIONAL: No fever   EYES: no visual disturbances or eye pain  NECK: No pain   RESPIRATORY: No cough; Shortness of breath  CARDIOVASCULAR: No chest pain  GASTROINTESTINAL: No abdominal pain. No nausea or vomiting   NEUROLOGICAL: No headache   MUSCULOSKELETAL: No joint pain, no muscle pain  GENITOURINARY: discomfort in scrotum due to swelling   ALL OTHER  ROS negative        Vital Signs Last 24 Hrs  T(C): 36.6 (07 Aug 2019 12:21), Max: 36.9 (06 Aug 2019 20:20)  T(F): 97.8 (07 Aug 2019 12:21), Max: 98.5 (06 Aug 2019 20:20)  HR: 56 (07 Aug 2019 12:21) (51 - 59)  BP: 121/57 (07 Aug 2019 12:21) (99/55 - 127/57)  RR: 17 (07 Aug 2019 12:21) (16 - 18)  SpO2: 99% (07 Aug 2019 12:21) (97% - 99%)    ________________________________________________  PHYSICAL EXAM:  GENERAL: Patient seen in bed and appears to be in mild distress due to shortness of breath; using nasal cannula to help  HEENT: Normocephalic;  conjunctivae and sclerae clear   NECK : supple  CHEST/LUNG: Mild wheezing heard throughout   HEART: S1 S2  regular; no murmurs  ABDOMEN: Soft, Nontender, Nondistended; Bowel sounds present  EXTREMITIES: no edema; no calf tenderness  GENITOURINARY: scrotal swelling; no tenderness to palpation; no increased warmth  SKIN: warm and dry  NERVOUS SYSTEM: Awake and alert; Oriented to place, person and time     _________________________________________________  LABS:                        14.2   9.38  )-----------( 160      ( 07 Aug 2019 07:03 )             45.9     08-07    144  |  109<H>  |  19<H>  ----------------------------<  96  4.6   |  28  |  1.27    Ca    8.7      07 Aug 2019 07:03  Phos  3.9     08-07  Mg     2.2     08-07      RADIOLOGY & ADDITIONAL TESTS:    Imaging Personally Reviewed:  YES    < from: US Testicles (08.06.19 @ 15:01) >  Impression: Increased color Doppler flow in the right testis, compatible   with orchitis.    Mild bilateral hydroceles.    Mild left varicocele.    < end of copied text >    Consultant(s) Notes Reviewed:   YES     Care Discussed with Consultants :     Plan of care was discussed with patient and /or primary care giver; all questions and concerns were addressed and care was aligned with patient's wishes.

## 2019-08-07 NOTE — PROGRESS NOTE ADULT - PROBLEM SELECTOR PLAN 4
hx of PTSD  - continue home medications  -Dr. Bryant psych consult noted: clonazepam increased to 0.25mg BID due to anxiety; will continue meds -patient has hx of hypothyroidism  -TSH elevated at 10.10  -endo consulted; f/u  -f/u repeat TSH, free T4 levels

## 2019-08-07 NOTE — PROGRESS NOTE ADULT - PROBLEM SELECTOR PLAN 3
-f/u testicular US  -scrotal sling -testicular US noted: right testis orchitis, b/l hydrocele, left varicocele  -urology consulted; f/u  -continue scrotal sling

## 2019-08-07 NOTE — PROGRESS NOTE ADULT - SUBJECTIVE AND OBJECTIVE BOX
Patient is a 73y old  Male who presents with a chief complaint of Shortness of breath. (06 Aug 2019 16:05)    pt seen in icu [  ], reg med floor [  x ], bed [x  ], chair at bedside [   ], a+o x3 [ x ], lethargic [  ],  nad [x]      Allergies    Allergy Status Unknown        Vitals    T(F): 97.2 (08-07-19 @ 05:19), Max: 98.5 (08-06-19 @ 20:20)  HR: 51 (08-07-19 @ 05:19) (51 - 58)  BP: 99/55 (08-07-19 @ 05:19) (99/55 - 127/57)  RR: 16 (08-07-19 @ 05:19) (16 - 18)  SpO2: 98% (08-07-19 @ 05:19) (97% - 98%)  Wt(kg): --  CAPILLARY BLOOD GLUCOSE          Labs                          14.2   9.38  )-----------( 160      ( 07 Aug 2019 07:03 )             45.9       08-07    144  |  109<H>  |  19<H>  ----------------------------<  96  4.6   |  28  |  1.27    Ca    8.7      07 Aug 2019 07:03  Phos  3.9     08-07  Mg     2.2     08-07        Radiology Results      < from: US Testicles (08.06.19 @ 15:01) >  Scrotal ultrasound is performed and compared to a previous examination   dated 2/27/2017. The right testis measures 3.9 x 2.0 x 3.5 cm and the   left testis measures 3.1 x 1.9 x 3.1 cm. Both testes maintain normal   echogenicity and echotexture without evidence for an intratesticular   mass. Duplex Doppler flow is demonstrated for both testes. There is   increased color Doppler flow in the right testis, compatible with   orchitis.    The right epididymal head measures 0.9 x 1.0 x 0.8 cm and contains a   3.5cm epididymal cyst which previously measured 2.7 cm. Contiguous with   this right epididymal cyst is a 0.4 cm extratesticular soft tissue   structure, suggestive of an adenomatoid tumor or an inflammatory mass.   There is a second small right epididymal cyst measuring 0.8 cm    The left epididymal head measures0.7 x 0.9 x 0.7 cm and contains a 0.4   cm epididymal cyst.    Mild bilateral hydroceles.    Mild left varicocele.    Scrotal wall edema is noted.    Impression: Increased color Doppler flow in the right testis, compatible   with orchitis.    Mild bilateral hydroceles.    Mild left varicocele.      < end of copied text >        Meds    MEDICATIONS  (STANDING):  ALBUTerol/ipratropium for Nebulization 3 milliLiter(s) Nebulizer every 6 hours  apixaban 5 milliGRAM(s) Oral every 12 hours  buDESOnide 160 MICROgram(s)/formoterol 4.5 MICROgram(s) Inhaler 2 Puff(s) Inhalation two times a day  buPROPion XL . 300 milliGRAM(s) Oral daily  citalopram 20 milliGRAM(s) Oral daily  clonazePAM  Tablet 0.25 milliGRAM(s) Oral daily  cyanocobalamin 1000 MICROGram(s) Oral daily  levothyroxine 175 MICROGram(s) Oral daily  melatonin 5 milliGRAM(s) Oral at bedtime  simvastatin 40 milliGRAM(s) Oral at bedtime  tiotropium 18 MICROgram(s) Capsule 1 Capsule(s) Inhalation daily  traZODone 150 milliGRAM(s) Oral daily      MEDICATIONS  (PRN):      Physical Exam      Neuro :  no focal deficits  Respiratory: CTA B/L  CV: RRR, S1S2, no murmurs,   Abdominal: Soft, NT, ND +BS,  Extremities: No edema, + peripheral pulses  Scrotum : moderate scrotal erythema and edema, testicular tenderness to palp    ASSESSMENT    Chronic obstructive pulmonary disease with acute exacerbation  dyspnea/orthopnea   PE r/o  anxiety   scrotal edema  right orchitis  b/l hydrocelle  b/l epididymal cysts   left varicocelle  right extratesticular soft tissue structure, suggestive of an adenomatoid tumor or an inflammatory mass  h/o Chronic GERD  COPD with asthma  Post traumatic stress disorder (PTSD)  HLD (hyperlipidemia)  Hypothyroidism  S/P thyroidectomy        PLAN    cta -ve for pe noted   pulm f/u noted  cont Bronchodilators  Oxygen supp  cont Spiriva  Pfts as OP.   cont zithromax to complete 7 days  echo noted EF 55-60% grade II diastolic dysfunction   cardio f/u   Stress test neg noted   f/u CT in 6mo to f/u Aortic aneurysm.   No b blocker due to low bp.  Obtain records from [facility] eliquis for now.  psych cons noted, increase clonazepam to 0.25 mg bid due to increased anxiety. no psych contraindications to discharge.   TSH elevated  endo cons for elevated tsh  scrotal sono results noted above  scrotal support  urology cons  cont current meds

## 2019-08-07 NOTE — PROGRESS NOTE ADULT - PROBLEM SELECTOR PLAN 7
pt on citalopram, trazodone, buproprion, clonazepam  - continue home meds - pt on citalopram, trazodone, buproprion, clonazepam  - continue home meds

## 2019-08-07 NOTE — DIETITIAN INITIAL EVALUATION ADULT. - PROBLEM SELECTOR PLAN 2
COPD exacerbation vs Acute CHF vs panic attack (as pt has PTSD) or from PE it self as pt was recently diagnosed with PE  pt was not taking eliquis at home as he did not have medications   today CTA negative for PE  will get echo for dyspnea/ orthopnea   BNP elevated 181 mildly elevated   cardio Dr. Brumfield

## 2019-08-07 NOTE — PROGRESS NOTE ADULT - SUBJECTIVE AND OBJECTIVE BOX
Pt is awake, alert, lying in bed in NAD. Had US testes.     INTERVAL HPI/OVERNIGHT EVENTS:      VITAL SIGNS:  T(F): 97.2 (08-07-19 @ 05:19)  HR: 56 (08-07-19 @ 10:00)  BP: 99/55 (08-07-19 @ 05:19)  RR: 16 (08-07-19 @ 05:19)  SpO2: 97% (08-07-19 @ 10:00)  Wt(kg): --  I&O's Detail    06 Aug 2019 07:01  -  07 Aug 2019 07:00  --------------------------------------------------------  IN:  Total IN: 0 mL    OUT:    Voided: 100 mL  Total OUT: 100 mL    Total NET: -100 mL              REVIEW OF SYSTEMS:    CONSTITUTIONAL:  No fevers, chills, sweats    HEENT:  Eyes:  No diplopia or blurred vision. ENT:  No earache, sore throat or runny nose.    CARDIOVASCULAR:  No pressure, squeezing, tightness, or heaviness about the chest; no palpitations.    RESPIRATORY:  Per HPI    GASTROINTESTINAL:  No abdominal pain, nausea, vomiting or diarrhea.    GENITOURINARY:  No dysuria, frequency or urgency.    NEUROLOGIC:  No paresthesias, fasciculations, seizures or weakness.    PSYCHIATRIC:  No disorder of thought or mood.      PHYSICAL EXAM:    Constitutional: Well developed and nourished  Eyes:Perrla  ENMT: normal  Neck: supple  Respiratory: good air entry  Cardiovascular: S1 S2 regular  Gastrointestinal: Soft, Non tender  Extremities: No edema  Vascular: normal  Neurological: Awake, alert,Ox3  Musculoskeletal: Normal  : Scrotal edema.     MEDICATIONS  (STANDING):  ALBUTerol/ipratropium for Nebulization 3 milliLiter(s) Nebulizer every 6 hours  apixaban 5 milliGRAM(s) Oral every 12 hours  buDESOnide 160 MICROgram(s)/formoterol 4.5 MICROgram(s) Inhaler 2 Puff(s) Inhalation two times a day  buPROPion XL . 300 milliGRAM(s) Oral daily  citalopram 20 milliGRAM(s) Oral daily  clonazePAM  Tablet 0.25 milliGRAM(s) Oral daily  cyanocobalamin 1000 MICROGram(s) Oral daily  levothyroxine 175 MICROGram(s) Oral daily  melatonin 5 milliGRAM(s) Oral at bedtime  simvastatin 40 milliGRAM(s) Oral at bedtime  tiotropium 18 MICROgram(s) Capsule 1 Capsule(s) Inhalation daily  traZODone 150 milliGRAM(s) Oral daily    MEDICATIONS  (PRN):      Allergies    Allergy Status Unknown    Intolerances        LABS:                        14.2   9.38  )-----------( 160      ( 07 Aug 2019 07:03 )             45.9     08-07    144  |  109<H>  |  19<H>  ----------------------------<  96  4.6   |  28  |  1.27    Ca    8.7      07 Aug 2019 07:03  Phos  3.9     08-07  Mg     2.2     08-07                  CAPILLARY BLOOD GLUCOSE        pro-bnp 185 08-02 @ 14:29     d-dimer --  08-02 @ 14:29      RADIOLOGY & ADDITIONAL TESTS:  < from: US Testicles (08.06.19 @ 15:01) >  Impression: Increased color Doppler flow in the right testis, compatible   with orchitis.    < end of copied text Pt has no cardiological or pulmonary contraindications for pulmonary rehabilitation after discharge. Pt will benefit from pulmonary rehabilitation as outpatient.les.    Mild left varicocele.    Other findings as above.Contiguous with   this right epididymal cyst is a 0.4 cm extratesticular soft tissue   structure, suggestive of an adenomatoid tumor or an inflammatory mass.     < end of copied text >    < from: US Testicles (08.06.19 @ 15:01) >  The left epididymal head measures0.7 x 0.9 x 0.7 cm and contains a 0.4   cm epididymal cyst.    < end of copied text >    ekg;    echo:  < from: Transthoracic Echocardiogram (08.03.19 @ 06:48) >  CONCLUSIONS:  1. Normal mitral valve. Mild mitral regurgitation.  2. Normal trileaflet aortic valve. Mild aortic  regurgitation.  3. Mild aortic root dilatation, consider CT or chest for  further evaluation.  4. Severely dilated left atrium.  LA volume index = 51  cc/m2.  5. Eccentric left ventricular hypertrophy (dilated left  ventricle with normal relative wall thickness).  6. Normal Left Ventricular Systolic Function,  (EF = 55 to  60%)  7. Grade II diastolic dysfunction.  8. Normal right atrium.  9. Normal right ventricular size and systolic function  (TAPSE  2.1cm).  10. RV systolic pressure is moderately increased at  46 mm  Hg.  11. There is mild tricuspid regurgitation.  12. There is mild pulmonic regurgitation.  13. Trivial pericardial effusion is seen.    < end of copied text >    < from: Nuclear Stress Test-Pharmacologic (08.05.19 @ 03:44) >  IMPRESSIONS:Normal Study  * Negative ECG evidence of ischemia after IV of Lexiscan.  * Reviewof raw data shows: Diaphragmatic artifact.  * There are medium sized, severe defects in  inferior &  inferoapical walls that are predominantly fixed consistent  with diaphragmatic attenuation artifact.  * Gated wall motion analysis is performed, and shows  normal wall motion with post stress LVEF of 52%.    < end of copied text >

## 2019-08-07 NOTE — CONSULT NOTE ADULT - SUBJECTIVE AND OBJECTIVE BOX
Patient is a 73y old  Male who presents with a chief complaint of Shortness of breath. (07 Aug 2019 12:27)      HPI  Called see and eval 73y.o. Male w/PMH a below for right orchitis. Pt admitted for SOB, currently on Eliquis for recent hx PE. Pt experienced sharp scrotal pain around 4AM the previous morning, associated with scrotal enlargement and redness. Pt states he has been experiencing mild enlargement of scrotum for months. Currently resting comfortably with resolution of scrotal pain. Pt states size of his scrotum has decreased. Also notes nocturia 5x per night. Pt states he has not been sexually active in years.    PAST MEDICAL & SURGICAL HISTORY:  Chronic GERD  COPD with asthma  Post traumatic stress disorder (PTSD)  HLD (hyperlipidemia)  History of pulmonary embolus (PE)  Hypothyroidism  Anxiety  S/P thyroidectomy      MEDICATIONS  (STANDING):  ALBUTerol/ipratropium for Nebulization 3 milliLiter(s) Nebulizer every 6 hours  apixaban 5 milliGRAM(s) Oral every 12 hours  buDESOnide 160 MICROgram(s)/formoterol 4.5 MICROgram(s) Inhaler 2 Puff(s) Inhalation two times a day  buPROPion XL . 300 milliGRAM(s) Oral daily  citalopram 20 milliGRAM(s) Oral daily  clonazePAM  Tablet 0.25 milliGRAM(s) Oral daily  cyanocobalamin 1000 MICROGram(s) Oral daily  levothyroxine 175 MICROGram(s) Oral daily  melatonin 5 milliGRAM(s) Oral at bedtime  simvastatin 40 milliGRAM(s) Oral at bedtime  tiotropium 18 MICROgram(s) Capsule 1 Capsule(s) Inhalation daily  traZODone 150 milliGRAM(s) Oral daily    Allergies    Allergy Status Unknown    Vital Signs Last 24 Hrs  T(C): 36.6 (07 Aug 2019 12:21), Max: 36.9 (06 Aug 2019 20:20)  T(F): 97.8 (07 Aug 2019 12:21), Max: 98.5 (06 Aug 2019 20:20)  HR: 56 (07 Aug 2019 12:21) (51 - 59)  BP: 121/57 (07 Aug 2019 12:21) (99/55 - 127/57)  BP(mean): --  RR: 17 (07 Aug 2019 12:21) (16 - 18)  SpO2: 99% (07 Aug 2019 12:21) (97% - 99%)    Physical:  Gen: A&Ox3. NAD  Abd: Soft ND, NT  Pelvis: Grossly normal circumcised penis without lesions. Scrotal enlargement L>R with mild erythema. Nontender testicles b/l without swelling.     I&O's Detail    06 Aug 2019 07:01  -  07 Aug 2019 07:00  --------------------------------------------------------  IN:  Total IN: 0 mL    OUT:    Voided: 100 mL  Total OUT: 100 mL    Total NET: -100 mL    LABS:                        14.2   9.38  )-----------( 160      ( 07 Aug 2019 07:03 )             45.9              08-07    144  |  109<H>  |  19<H>  ----------------------------<  96  4.6   |  28  |  1.27    Ca    8.7      07 Aug 2019 07:03  Phos  3.9     08-07  Mg     2.2     08-07    RADIOLOGY & ADDITIONAL STUDIES:  < from: US Testicles (08.06.19 @ 15:01) >  Scrotal ultrasound is performed and compared to a previous examination   dated 2/27/2017. The right testis measures 3.9 x 2.0 x 3.5 cm and the   left testis measures 3.1 x 1.9 x 3.1 cm. Both testes maintain normal   echogenicity and echotexture without evidence for an intratesticular   mass. Duplex Doppler flow is demonstrated for both testes. There is   increased color Doppler flow in the right testis, compatible with   orchitis.    The right epididymal head measures 0.9 x 1.0 x 0.8 cm and contains a   3.5cm epididymal cyst which previously measured 2.7 cm. Contiguous with   this right epididymal cyst is a 0.4 cm extratesticular soft tissue   structure, suggestive of an adenomatoid tumor or an inflammatory mass.   There is a second small right epididymal cyst measuring 0.8 cm    The left epididymal head measures0.7 x 0.9 x 0.7 cm and contains a 0.4   cm epididymal cyst.    Mild bilateral hydroceles.    Mild left varicocele.    < end of copied text >    < from: US Testicles (02.27.17 @ 15:36) >  FINDINGS:    Right testis: 4.5 x 3.2 x 2.1 cm. Normal echogenicity and echotexture   with no masses or areas of architectural distortion. Normal blood flow   pattern.  Right epididymis: 2.7 cm cyst of the epididymal head.    Left testis: 4.1 x 3.0 x 2.0 cm. Normal echogenicity and echotexture with   no masses or areas of architectural distortion. Normal blood flow pattern.  Left epididymis: Within normal limits.    Hydrocele: None.  Varicocele: Small left varicocele.    Fat-containingleft scrotal hernia.    < end of copied text >

## 2019-08-07 NOTE — PROGRESS NOTE ADULT - SUBJECTIVE AND OBJECTIVE BOX
CHIEF COMPLAINT:Patient is a 73y old  Male who presents with a chief complaint of Shortness of breath. Pt appears comfortable.    	  REVIEW OF SYSTEMS:  CONSTITUTIONAL: No fever, weight loss, or fatigue  EYES: No eye pain, visual disturbances, or discharge  ENT:  No difficulty hearing, tinnitus, vertigo; No sinus or throat pain  NECK: No pain or stiffness  RESPIRATORY: No cough, wheezing, chills or hemoptysis; No Shortness of Breath  CARDIOVASCULAR: No chest pain, palpitations, passing out, dizziness, or leg swelling  GASTROINTESTINAL: No abdominal or epigastric pain. No nausea, vomiting, or hematemesis; No diarrhea or constipation. No melena or hematochezia.  GENITOURINARY: No dysuria, frequency, hematuria, or incontinence  NEUROLOGICAL: No headaches, memory loss, loss of strength, numbness, or tremors  SKIN: No itching, burning, rashes, or lesions   LYMPH Nodes: No enlarged glands  ENDOCRINE: No heat or cold intolerance; No hair loss  MUSCULOSKELETAL: No joint pain or swelling; No muscle, back, or extremity pain  PSYCHIATRIC: No depression, anxiety, mood swings, or difficulty sleeping  HEME/LYMPH: No easy bruising, or bleeding gums  ALLERGY AND IMMUNOLOGIC: No hives or eczema	      PHYSICAL EXAM:  T(C): 36.2 (08-07-19 @ 05:19), Max: 36.9 (08-06-19 @ 20:20)  HR: 56 (08-07-19 @ 10:00) (51 - 59)  BP: 99/55 (08-07-19 @ 05:19) (99/55 - 127/57)  RR: 16 (08-07-19 @ 05:19) (16 - 18)  SpO2: 97% (08-07-19 @ 10:00) (97% - 98%)  Wt(kg): --  I&O's Summary    06 Aug 2019 07:01  -  07 Aug 2019 07:00  --------------------------------------------------------  IN: 0 mL / OUT: 100 mL / NET: -100 mL        Appearance: Normal	  HEENT:   Normal oral mucosa, PERRL, EOMI	  Lymphatic: No lymphadenopathy  Cardiovascular: Normal S1 S2, No JVD, No murmurs, No edema  Respiratory: Lungs clear to auscultation	  Psychiatry: A & O x 3, Mood & affect appropriate  Gastrointestinal:  Soft, Non-tender, + BS	  Skin: No rashes, No ecchymoses, No cyanosis	  Neurologic: Non-focal  Extremities: Normal range of motion, No clubbing, cyanosis or edema  Vascular: Peripheral pulses palpable 2+ bilaterally    MEDICATIONS  (STANDING):  ALBUTerol/ipratropium for Nebulization 3 milliLiter(s) Nebulizer every 6 hours  apixaban 5 milliGRAM(s) Oral every 12 hours  buDESOnide 160 MICROgram(s)/formoterol 4.5 MICROgram(s) Inhaler 2 Puff(s) Inhalation two times a day  buPROPion XL . 300 milliGRAM(s) Oral daily  citalopram 20 milliGRAM(s) Oral daily  clonazePAM  Tablet 0.25 milliGRAM(s) Oral daily  cyanocobalamin 1000 MICROGram(s) Oral daily  levothyroxine 175 MICROGram(s) Oral daily  melatonin 5 milliGRAM(s) Oral at bedtime  simvastatin 40 milliGRAM(s) Oral at bedtime  tiotropium 18 MICROgram(s) Capsule 1 Capsule(s) Inhalation daily  traZODone 150 milliGRAM(s) Oral daily      	  LABS:	 	                        14.2   9.38  )-----------( 160      ( 07 Aug 2019 07:03 )             45.9     08-07    144  |  109<H>  |  19<H>  ----------------------------<  96  4.6   |  28  |  1.27    Ca    8.7      07 Aug 2019 07:03  Phos  3.9     08-07  Mg     2.2     08-07      proBNP: Serum Pro-Brain Natriuretic Peptide: 185 pg/mL (08-02 @ 14:29)  Serum Pro-Brain Natriuretic Peptide: 141 pg/mL (07-26 @ 09:09)    Lipid Profile: Cholesterol 121  LDL 59  HDL 29        TSH: Thyroid Stimulating Hormone, Serum: 10.10 uU/mL (08-03 @ 05:58)      EXAM:  US SCROTUM AND CONTENTS                            PROCEDURE DATE:  08/06/2019          INTERPRETATION:  Scrotal ultrasound    Indication: Scrotal erythema and edema.    Scrotal ultrasound is performed and compared to a previous examination   dated 2/27/2017. The right testis measures 3.9 x 2.0 x 3.5 cm and the   left testis measures 3.1 x 1.9 x 3.1 cm. Both testes maintain normal   echogenicity and echotexture without evidence for an intratesticular   mass. Duplex Doppler flow is demonstrated for both testes. There is   increased color Doppler flow in the right testis, compatible with   orchitis.    The right epididymal head measures 0.9 x 1.0 x 0.8 cm and contains a   3.5cm epididymal cyst which previously measured 2.7 cm. Contiguous with   this right epididymal cyst is a 0.4 cm extratesticular soft tissue   structure, suggestive of an adenomatoid tumor or an inflammatory mass.   There is a second small right epididymal cyst measuring 0.8 cm    The left epididymal head measures0.7 x 0.9 x 0.7 cm and contains a 0.4   cm epididymal cyst.    Mild bilateral hydroceles.    Mild left varicocele.    Scrotal wall edema is noted.    Impression: Increased color Doppler flow in the right testis, compatible   with orchitis.    Mild bilateral hydroceles.    Mild left varicocele.    Other findings as above.        IMPRESSIONS:Normal Study  * Negative ECG evidence of ischemia after IV of Lexiscan.  * Reviewof raw data shows: Diaphragmatic artifact.  * There are medium sized, severe defects in  inferior &  inferoapical walls that are predominantly fixed consistent  with diaphragmatic attenuation artifact.  * Gated wall motion analysis is performed, and shows  normal wall motion with post stress LVEF of 52%.

## 2019-08-07 NOTE — PROGRESS NOTE ADULT - PROBLEM SELECTOR PLAN 9
IMPROVE VTE Individual Risk Assessment  RISK                                                                Points  [ x] Previous VTE                                                  3  [  ] Thrombophilia                                               2    [  ] Lower limb paralysis                                      2        (unable to hold up >15 seconds)      [  ] Current Cancer                                              2         (within 6 months)  [  ] Immobilization > 24 hrs                                1  [  ] ICU/CCU stay > 24 hours                              1    [ x ] Age > 60                                                      1    IMPROVE VTE Score 4  c/w eliquis continue simvastatin 40 mg

## 2019-08-07 NOTE — DIETITIAN INITIAL EVALUATION ADULT. - OTHER INFO
Pt visited,. Pt seen for LOS. Per pt he was Hospitalized  recently and Multiple admission causing ~40 lbsx 4 weeks ( 14 % ) d/t fluid loss and being sick. Appetite is Good, No chewing or swallowing Problem reported.

## 2019-08-08 ENCOUNTER — TRANSCRIPTION ENCOUNTER (OUTPATIENT)
Age: 74
End: 2019-08-08

## 2019-08-08 ENCOUNTER — APPOINTMENT (OUTPATIENT)
Dept: DERMATOLOGY | Facility: CLINIC | Age: 74
End: 2019-08-08

## 2019-08-08 VITALS — OXYGEN SATURATION: 98 %

## 2019-08-08 LAB
ANION GAP SERPL CALC-SCNC: 3 MMOL/L — LOW (ref 5–17)
BASOPHILS # BLD AUTO: 0.07 K/UL — SIGNIFICANT CHANGE UP (ref 0–0.2)
BASOPHILS NFR BLD AUTO: 0.7 % — SIGNIFICANT CHANGE UP (ref 0–2)
BUN SERPL-MCNC: 20 MG/DL — HIGH (ref 7–18)
CALCIUM SERPL-MCNC: 8.2 MG/DL — LOW (ref 8.4–10.5)
CHLORIDE SERPL-SCNC: 107 MMOL/L — SIGNIFICANT CHANGE UP (ref 96–108)
CO2 SERPL-SCNC: 30 MMOL/L — SIGNIFICANT CHANGE UP (ref 22–31)
CREAT SERPL-MCNC: 1.11 MG/DL — SIGNIFICANT CHANGE UP (ref 0.5–1.3)
CULTURE RESULTS: SIGNIFICANT CHANGE UP
EOSINOPHIL # BLD AUTO: 0.56 K/UL — HIGH (ref 0–0.5)
EOSINOPHIL NFR BLD AUTO: 5.6 % — SIGNIFICANT CHANGE UP (ref 0–6)
GLUCOSE SERPL-MCNC: 115 MG/DL — HIGH (ref 70–99)
HCT VFR BLD CALC: 42.7 % — SIGNIFICANT CHANGE UP (ref 39–50)
HGB BLD-MCNC: 13.8 G/DL — SIGNIFICANT CHANGE UP (ref 13–17)
IMM GRANULOCYTES NFR BLD AUTO: 0.4 % — SIGNIFICANT CHANGE UP (ref 0–1.5)
LYMPHOCYTES # BLD AUTO: 2.84 K/UL — SIGNIFICANT CHANGE UP (ref 1–3.3)
LYMPHOCYTES # BLD AUTO: 28.6 % — SIGNIFICANT CHANGE UP (ref 13–44)
MCHC RBC-ENTMCNC: 30.9 PG — SIGNIFICANT CHANGE UP (ref 27–34)
MCHC RBC-ENTMCNC: 32.3 GM/DL — SIGNIFICANT CHANGE UP (ref 32–36)
MCV RBC AUTO: 95.5 FL — SIGNIFICANT CHANGE UP (ref 80–100)
MONOCYTES # BLD AUTO: 0.88 K/UL — SIGNIFICANT CHANGE UP (ref 0–0.9)
MONOCYTES NFR BLD AUTO: 8.9 % — SIGNIFICANT CHANGE UP (ref 2–14)
NEUTROPHILS # BLD AUTO: 5.54 K/UL — SIGNIFICANT CHANGE UP (ref 1.8–7.4)
NEUTROPHILS NFR BLD AUTO: 55.8 % — SIGNIFICANT CHANGE UP (ref 43–77)
NRBC # BLD: 0 /100 WBCS — SIGNIFICANT CHANGE UP (ref 0–0)
PLATELET # BLD AUTO: 149 K/UL — LOW (ref 150–400)
POTASSIUM SERPL-MCNC: 4 MMOL/L — SIGNIFICANT CHANGE UP (ref 3.5–5.3)
POTASSIUM SERPL-SCNC: 4 MMOL/L — SIGNIFICANT CHANGE UP (ref 3.5–5.3)
RBC # BLD: 4.47 M/UL — SIGNIFICANT CHANGE UP (ref 4.2–5.8)
RBC # FLD: 14.5 % — SIGNIFICANT CHANGE UP (ref 10.3–14.5)
SODIUM SERPL-SCNC: 140 MMOL/L — SIGNIFICANT CHANGE UP (ref 135–145)
SPECIMEN SOURCE: SIGNIFICANT CHANGE UP
WBC # BLD: 9.93 K/UL — SIGNIFICANT CHANGE UP (ref 3.8–10.5)
WBC # FLD AUTO: 9.93 K/UL — SIGNIFICANT CHANGE UP (ref 3.8–10.5)

## 2019-08-08 PROCEDURE — 82306 VITAMIN D 25 HYDROXY: CPT

## 2019-08-08 PROCEDURE — 76870 US EXAM SCROTUM: CPT

## 2019-08-08 PROCEDURE — 82553 CREATINE MB FRACTION: CPT

## 2019-08-08 PROCEDURE — 82550 ASSAY OF CK (CPK): CPT

## 2019-08-08 PROCEDURE — 71275 CT ANGIOGRAPHY CHEST: CPT

## 2019-08-08 PROCEDURE — 85610 PROTHROMBIN TIME: CPT

## 2019-08-08 PROCEDURE — 93005 ELECTROCARDIOGRAM TRACING: CPT

## 2019-08-08 PROCEDURE — 93017 CV STRESS TEST TRACING ONLY: CPT

## 2019-08-08 PROCEDURE — 81003 URINALYSIS AUTO W/O SCOPE: CPT

## 2019-08-08 PROCEDURE — 80061 LIPID PANEL: CPT

## 2019-08-08 PROCEDURE — 84443 ASSAY THYROID STIM HORMONE: CPT

## 2019-08-08 PROCEDURE — 82746 ASSAY OF FOLIC ACID SERUM: CPT

## 2019-08-08 PROCEDURE — 36415 COLL VENOUS BLD VENIPUNCTURE: CPT

## 2019-08-08 PROCEDURE — 84100 ASSAY OF PHOSPHORUS: CPT

## 2019-08-08 PROCEDURE — 78452 HT MUSCLE IMAGE SPECT MULT: CPT

## 2019-08-08 PROCEDURE — 82607 VITAMIN B-12: CPT

## 2019-08-08 PROCEDURE — 93306 TTE W/DOPPLER COMPLETE: CPT

## 2019-08-08 PROCEDURE — 99285 EMERGENCY DEPT VISIT HI MDM: CPT | Mod: 25

## 2019-08-08 PROCEDURE — 86803 HEPATITIS C AB TEST: CPT

## 2019-08-08 PROCEDURE — 80053 COMPREHEN METABOLIC PANEL: CPT

## 2019-08-08 PROCEDURE — 85027 COMPLETE CBC AUTOMATED: CPT

## 2019-08-08 PROCEDURE — 83735 ASSAY OF MAGNESIUM: CPT

## 2019-08-08 PROCEDURE — 94640 AIRWAY INHALATION TREATMENT: CPT

## 2019-08-08 PROCEDURE — 85730 THROMBOPLASTIN TIME PARTIAL: CPT

## 2019-08-08 PROCEDURE — 83880 ASSAY OF NATRIURETIC PEPTIDE: CPT

## 2019-08-08 PROCEDURE — 87086 URINE CULTURE/COLONY COUNT: CPT

## 2019-08-08 PROCEDURE — 84484 ASSAY OF TROPONIN QUANT: CPT

## 2019-08-08 PROCEDURE — 80048 BASIC METABOLIC PNL TOTAL CA: CPT

## 2019-08-08 PROCEDURE — 84439 ASSAY OF FREE THYROXINE: CPT

## 2019-08-08 PROCEDURE — A9502: CPT

## 2019-08-08 RX ORDER — APIXABAN 2.5 MG/1
1 TABLET, FILM COATED ORAL
Qty: 60 | Refills: 0
Start: 2019-08-08 | End: 2019-09-06

## 2019-08-08 RX ORDER — APIXABAN 2.5 MG/1
1 TABLET, FILM COATED ORAL
Qty: 0 | Refills: 0 | DISCHARGE
Start: 2019-08-08

## 2019-08-08 RX ADMIN — Medication 175 MICROGRAM(S): at 05:56

## 2019-08-08 RX ADMIN — Medication 3 MILLILITER(S): at 08:45

## 2019-08-08 RX ADMIN — APIXABAN 5 MILLIGRAM(S): 2.5 TABLET, FILM COATED ORAL at 05:56

## 2019-08-08 RX ADMIN — Medication 3 MILLILITER(S): at 03:46

## 2019-08-08 NOTE — PROGRESS NOTE ADULT - REASON FOR ADMISSION
Shortness of breath.

## 2019-08-08 NOTE — DISCHARGE NOTE NURSING/CASE MANAGEMENT/SOCIAL WORK - NSDCDPATPORTLINK_GEN_ALL_CORE
You can access the [facility] Patient Portal, offered by [facility], by registering with the following website: [email or web address]

## 2019-08-08 NOTE — PROGRESS NOTE ADULT - PROBLEM SELECTOR PROBLEM 1
COPD exacerbation
Chronic obstructive pulmonary disease, unspecified COPD type
COPD exacerbation
Chronic obstructive pulmonary disease, unspecified COPD type

## 2019-08-08 NOTE — PROGRESS NOTE ADULT - SUBJECTIVE AND OBJECTIVE BOX
Patient is a 73y old  Male who presents with a chief complaint of Shortness of breath. (07 Aug 2019 12:56)    pt seen in icu [  ], reg med floor [  x ], bed [x  ], chair at bedside [   ], a+o x3 [ x ], lethargic [  ],  nad [x]        Allergies    Allergy Status Unknown        Vitals    T(F): 97.5 (08-08-19 @ 05:23), Max: 98.9 (08-07-19 @ 20:35)  HR: 57 (08-08-19 @ 05:23) (56 - 60)  BP: 110/66 (08-08-19 @ 05:23) (110/66 - 123/69)  RR: 16 (08-08-19 @ 05:23) (14 - 17)  SpO2: 98% (08-08-19 @ 05:23) (96% - 99%)  Wt(kg): --  CAPILLARY BLOOD GLUCOSE          Labs                          13.8   9.93  )-----------( 149      ( 08 Aug 2019 05:57 )             42.7       08-08    140  |  107  |  20<H>  ----------------------------<  115<H>  4.0   |  30  |  1.11    Ca    8.2<L>      08 Aug 2019 05:57  Phos  3.9     08-07  Mg     2.2     08-07                  Radiology Results      Meds    MEDICATIONS  (STANDING):  ALBUTerol/ipratropium for Nebulization 3 milliLiter(s) Nebulizer every 6 hours  apixaban 5 milliGRAM(s) Oral every 12 hours  buDESOnide 160 MICROgram(s)/formoterol 4.5 MICROgram(s) Inhaler 2 Puff(s) Inhalation two times a day  buPROPion XL . 300 milliGRAM(s) Oral daily  citalopram 20 milliGRAM(s) Oral daily  clonazePAM  Tablet 0.25 milliGRAM(s) Oral daily  cyanocobalamin 1000 MICROGram(s) Oral daily  levothyroxine 175 MICROGram(s) Oral daily  melatonin 5 milliGRAM(s) Oral at bedtime  simvastatin 40 milliGRAM(s) Oral at bedtime  tiotropium 18 MICROgram(s) Capsule 1 Capsule(s) Inhalation daily  traZODone 150 milliGRAM(s) Oral daily      MEDICATIONS  (PRN):      Physical Exam      Neuro :  no focal deficits  Respiratory: CTA B/L  CV: RRR, S1S2, no murmurs,   Abdominal: Soft, NT, ND +BS,  Extremities: No edema, + peripheral pulses  Scrotum : moderate scrotal erythema and edema, testicular tenderness to palp    ASSESSMENT    Chronic obstructive pulmonary disease with acute exacerbation  dyspnea/orthopnea   PE r/o  anxiety   scrotal edema  right orchitis  b/l hydrocelle  b/l epididymal cysts   left varicocelle  right extratesticular soft tissue structure, suggestive of an adenomatoid tumor or an inflammatory mass  h/o Chronic GERD  COPD with asthma  Post traumatic stress disorder (PTSD)  HLD (hyperlipidemia)  Hypothyroidism  S/P thyroidectomy        PLAN    cta -ve for pe noted   pulm f/u noted  cont Bronchodilators  Oxygen supp  cont Spiriva  Pfts as OP.   cont zithromax to complete 7 days  echo noted EF 55-60% grade II diastolic dysfunction   cardio f/u   Stress test neg noted   f/u CT in 6mo to f/u Aortic aneurysm.   No b blocker due to low bp.  Obtain records from [place],   cont eliquis for now.  psych cons noted, increase clonazepam to 0.25 mg bid due to increased anxiety. no psych contraindications to discharge.   TSH elevated  endo f/u for elevated tsh  free t4 wnl   tsh elevated (improving)   adjust levothyroxine dose   scrotal sono results noted above  scrotal support  urology f/u   start empiric abx  UA -ve  f/u Ucx  Outpatient f/u with Dr. Griffin for hydrocele, varicocele, and soft tissue mass management  cont current meds Patient is a 73y old  Male who presents with a chief complaint of Shortness of breath. (07 Aug 2019 12:56)    pt seen in icu [  ], reg med floor [  x ], bed [x  ], chair at bedside [   ], a+o x3 [ x ], lethargic [  ],  nad [x]        Allergies    Allergy Status Unknown        Vitals    T(F): 97.5 (08-08-19 @ 05:23), Max: 98.9 (08-07-19 @ 20:35)  HR: 57 (08-08-19 @ 05:23) (56 - 60)  BP: 110/66 (08-08-19 @ 05:23) (110/66 - 123/69)  RR: 16 (08-08-19 @ 05:23) (14 - 17)  SpO2: 98% (08-08-19 @ 05:23) (96% - 99%)  Wt(kg): --  CAPILLARY BLOOD GLUCOSE          Labs                          13.8   9.93  )-----------( 149      ( 08 Aug 2019 05:57 )             42.7       08-08    140  |  107  |  20<H>  ----------------------------<  115<H>  4.0   |  30  |  1.11    Ca    8.2<L>      08 Aug 2019 05:57  Phos  3.9     08-07  Mg     2.2     08-07                  Radiology Results      Meds    MEDICATIONS  (STANDING):  ALBUTerol/ipratropium for Nebulization 3 milliLiter(s) Nebulizer every 6 hours  apixaban 5 milliGRAM(s) Oral every 12 hours  buDESOnide 160 MICROgram(s)/formoterol 4.5 MICROgram(s) Inhaler 2 Puff(s) Inhalation two times a day  buPROPion XL . 300 milliGRAM(s) Oral daily  citalopram 20 milliGRAM(s) Oral daily  clonazePAM  Tablet 0.25 milliGRAM(s) Oral daily  cyanocobalamin 1000 MICROGram(s) Oral daily  levothyroxine 175 MICROGram(s) Oral daily  melatonin 5 milliGRAM(s) Oral at bedtime  simvastatin 40 milliGRAM(s) Oral at bedtime  tiotropium 18 MICROgram(s) Capsule 1 Capsule(s) Inhalation daily  traZODone 150 milliGRAM(s) Oral daily      MEDICATIONS  (PRN):      Physical Exam      Neuro :  no focal deficits  Respiratory: CTA B/L  CV: RRR, S1S2, no murmurs,   Abdominal: Soft, NT, ND +BS,  Extremities: No edema, + peripheral pulses  Scrotum : moderate scrotal erythema and edema, testicular tenderness to palp    ASSESSMENT    Chronic obstructive pulmonary disease with acute exacerbation  dyspnea/orthopnea   PE r/o  anxiety   scrotal edema  right orchitis  b/l hydrocelle  b/l epididymal cysts   left varicocelle  right extratesticular soft tissue structure, suggestive of an adenomatoid tumor or an inflammatory mass  h/o Chronic GERD  COPD with asthma  Post traumatic stress disorder (PTSD)  HLD (hyperlipidemia)  Hypothyroidism  S/P thyroidectomy        PLAN    cta -ve for pe noted   pulm f/u noted  cont Bronchodilators  Oxygen supp  cont Spiriva  Pfts as OP.   cont zithromax to complete 7 days  echo noted EF 55-60% grade II diastolic dysfunction   cardio f/u   Stress test neg noted   f/u CT in 6mo to f/u Aortic aneurysm.   No b blocker due to low bp.  Obtain records from [place],   cont eliquis for now.  psych cons noted, increase clonazepam to 0.25 mg bid due to increased anxiety. no psych contraindications to discharge.   TSH elevated  endo f/u for elevated tsh  free t4 wnl   tsh elevated (improving)   f/u endo as outpt   scrotal sono results noted above  scrotal support  urology f/u   start empiric abx  UA -ve  f/u Ucx  Outpatient f/u with Dr. Griffin for hydrocele, varicocele, and soft tissue mass management  cont current meds   d/c planning

## 2019-08-08 NOTE — PROGRESS NOTE ADULT - SUBJECTIVE AND OBJECTIVE BOX
Patient is awake, alert, lying in bed in NAD. Feels better.     INTERVAL HPI/OVERNIGHT EVENTS:      VITAL SIGNS:  T(F): 97.5 (19 @ 05:23)  HR: 55 (19 @ 10:09)  BP: 110/66 (19 @ 05:23)  RR: 16 (19 @ 05:23)  SpO2: 98% (19 @ 10:09)  Wt(kg): --  I&O's Detail          REVIEW OF SYSTEMS:    CONSTITUTIONAL:  No fevers, chills, sweats    HEENT:  Eyes:  No diplopia or blurred vision. ENT:  No earache, sore throat or runny nose.    CARDIOVASCULAR:  No pressure, squeezing, tightness, or heaviness about the chest; no palpitations.    RESPIRATORY:  Per HPI    GASTROINTESTINAL:  No abdominal pain, nausea, vomiting or diarrhea.    GENITOURINARY:  No dysuria, frequency or urgency.    NEUROLOGIC:  No paresthesias, fasciculations, seizures or weakness.    PSYCHIATRIC:  No disorder of thought or mood.      PHYSICAL EXAM:    Constitutional: Well developed and nourished  Eyes:Perrla  ENMT: normal  Neck:supple  Respiratory: good air entry  Cardiovascular: S1 S2 regular  Gastrointestinal: Soft, Non tender  Extremities: No edema  Vascular:normal  Neurological:Awake, alert,Ox3  Musculoskeletal:Normal      MEDICATIONS  (STANDING):  ALBUTerol/ipratropium for Nebulization 3 milliLiter(s) Nebulizer every 6 hours  apixaban 5 milliGRAM(s) Oral every 12 hours  buDESOnide 160 MICROgram(s)/formoterol 4.5 MICROgram(s) Inhaler 2 Puff(s) Inhalation two times a day  buPROPion XL . 300 milliGRAM(s) Oral daily  citalopram 20 milliGRAM(s) Oral daily  clonazePAM  Tablet 0.25 milliGRAM(s) Oral daily  cyanocobalamin 1000 MICROGram(s) Oral daily  levothyroxine 175 MICROGram(s) Oral daily  melatonin 5 milliGRAM(s) Oral at bedtime  simvastatin 40 milliGRAM(s) Oral at bedtime  tiotropium 18 MICROgram(s) Capsule 1 Capsule(s) Inhalation daily  traZODone 150 milliGRAM(s) Oral daily    MEDICATIONS  (PRN):      Allergies    Allergy Status Unknown    Intolerances        LABS:                        13.8   9.93  )-----------( 149      ( 08 Aug 2019 05:57 )             42.7     08-08    140  |  107  |  20<H>  ----------------------------<  115<H>  4.0   |  30  |  1.11    Ca    8.2<L>      08 Aug 2019 05:57  Phos  3.9       Mg     2.2     -07        Urinalysis Basic - ( 07 Aug 2019 20:15 )    Color: Yellow / Appearance: Clear / S.020 / pH: x  Gluc: x / Ketone: Negative  / Bili: Negative / Urobili: Negative   Blood: x / Protein: Negative / Nitrite: Negative   Leuk Esterase: Negative / RBC: x / WBC x   Sq Epi: x / Non Sq Epi: x / Bacteria: x            CAPILLARY BLOOD GLUCOSE        pro-bnp 185  @ 14:29     d-dimer --   @ 14:29      RADIOLOGY & ADDITIONAL TESTS:    CXR:    Ct scan chest:    ekg;    echo:

## 2019-08-08 NOTE — PROGRESS NOTE ADULT - SUBJECTIVE AND OBJECTIVE BOX
CHIEF COMPLAINT:Patient is a 73y old  Male who presents with a chief complaint of Shortness of breath. Pt appears comfortable.    	  REVIEW OF SYSTEMS:  CONSTITUTIONAL: No fever, weight loss, or fatigue  EYES: No eye pain, visual disturbances, or discharge  ENT:  No difficulty hearing, tinnitus, vertigo; No sinus or throat pain  NECK: No pain or stiffness  RESPIRATORY: No cough, wheezing, chills or hemoptysis; No Shortness of Breath  CARDIOVASCULAR: No chest pain, palpitations, passing out, dizziness, or leg swelling  GASTROINTESTINAL: No abdominal or epigastric pain. No nausea, vomiting, or hematemesis; No diarrhea or constipation. No melena or hematochezia.  GENITOURINARY: No dysuria, frequency, hematuria, or incontinence  NEUROLOGICAL: No headaches, memory loss, loss of strength, numbness, or tremors  SKIN: No itching, burning, rashes, or lesions   LYMPH Nodes: No enlarged glands  ENDOCRINE: No heat or cold intolerance; No hair loss  MUSCULOSKELETAL: No joint pain or swelling; No muscle, back, or extremity pain  PSYCHIATRIC: No depression, anxiety, mood swings, or difficulty sleeping  HEME/LYMPH: No easy bruising, or bleeding gums  ALLERGY AND IMMUNOLOGIC: No hives or eczema	      PHYSICAL EXAM:  T(C): 36.4 (08-08-19 @ 05:23), Max: 37.2 (08-07-19 @ 20:35)  HR: 55 (08-08-19 @ 10:09) (55 - 60)  BP: 110/66 (08-08-19 @ 05:23) (110/66 - 123/69)  RR: 16 (08-08-19 @ 05:23) (14 - 17)  SpO2: 98% (08-08-19 @ 10:09) (96% - 99%)  Wt(kg): --  I&O's Summary      Appearance: Normal	  HEENT:   Normal oral mucosa, PERRL, EOMI	  Lymphatic: No lymphadenopathy  Cardiovascular: Normal S1 S2, No JVD, No murmurs, No edema  Respiratory: Lungs clear to auscultation	  Psychiatry: A & O x 3, Mood & affect appropriate  Gastrointestinal:  Soft, Non-tender, + BS	  Skin: No rashes, No ecchymoses, No cyanosis	  Neurologic: Non-focal  Extremities: Normal range of motion, No clubbing, cyanosis or edema  Vascular: Peripheral pulses palpable 2+ bilaterally    MEDICATIONS  (STANDING):  ALBUTerol/ipratropium for Nebulization 3 milliLiter(s) Nebulizer every 6 hours  apixaban 5 milliGRAM(s) Oral every 12 hours  buDESOnide 160 MICROgram(s)/formoterol 4.5 MICROgram(s) Inhaler 2 Puff(s) Inhalation two times a day  buPROPion XL . 300 milliGRAM(s) Oral daily  citalopram 20 milliGRAM(s) Oral daily  clonazePAM  Tablet 0.25 milliGRAM(s) Oral daily  cyanocobalamin 1000 MICROGram(s) Oral daily  levothyroxine 175 MICROGram(s) Oral daily  melatonin 5 milliGRAM(s) Oral at bedtime  simvastatin 40 milliGRAM(s) Oral at bedtime  tiotropium 18 MICROgram(s) Capsule 1 Capsule(s) Inhalation daily  traZODone 150 milliGRAM(s) Oral daily      LABS:	 	                         13.8   9.93  )-----------( 149      ( 08 Aug 2019 05:57 )             42.7     08-08    140  |  107  |  20<H>  ----------------------------<  115<H>  4.0   |  30  |  1.11    Ca    8.2<L>      08 Aug 2019 05:57  Phos  3.9     08-07  Mg     2.2     08-07      proBNP: Serum Pro-Brain Natriuretic Peptide: 185 pg/mL (08-02 @ 14:29)  Serum Pro-Brain Natriuretic Peptide: 141 pg/mL (07-26 @ 09:09)    Lipid Profile: Cholesterol 121  LDL 59  HDL 29        TSH: Thyroid Stimulating Hormone, Serum: 7.27 uU/mL (08-07 @ 12:42)  Thyroid Stimulating Hormone, Serum: 10.10 uU/mL (08-03 @ 05:58)

## 2019-08-08 NOTE — PROGRESS NOTE ADULT - ASSESSMENT
74 yo male exsmoker with COPD, hypothyroidism, HLD, PTSD and hx of recent PE s/p 1 week of eliquis, came with complains of difficulty breathing.   1.Stress test-R/O ischemia.  2.Obtain records from [place],cont eliquis for now.  3.Hypothyroidism-synthroid.  4.COPD-as per pulmonary.  5.Lipid d/o-statin.  6.PTSD-cont psych medication.  7.Aortic aneurysm-f/u CT in 6mo. No b blocker due to low bp.  8.PPI.
72 yo male exsmoker with COPD, hypothyroidism, HLD, PTSD and hx of recent PE s/p 1 week of eliquis, came with complains of difficulty breathing,Rt Orchitis..   1.Aortic aneurysm-f/u CT in 6mo. No b blocker due to low bp.  2.PE-on  eliquis.  3.Hypothyroidism-synthroid.  4.COPD-as per pulmonary.  5.Lipid d/o-statin.  6.PTSD-cont psych medication.  7.PPI.
72 yo male exsmoker with COPD, hypothyroidism, HLD, PTSD and hx of recent PE s/p 1 week of eliquis, came with complains of difficulty breathing.   1.PT.  2.PE-on  eliquis.  3.Hypothyroidism-synthroid.  4.COPD-as per pulmonary.  5.Lipid d/o-statin.  6.PTSD-cont psych medication.  7.Aortic aneurysm-f/u CT in 6mo. No b blocker due to low bp.  8.PPI.
72 yo male exsmoker with COPD, hypothyroidism, HLD, PTSD and hx of recent PE s/p 1 week of eliquis, presented to the ED with SOB and was found to be tachypneic . He c/o orthopnea, PND. CTA was done to r/o PE as his hx was significant for recent PE and noncompliance with medication. Pt was admitted for dyspnea to r/o CHF exacerbation vs COPD exacerbation.
72 yo male exsmoker with COPD, hypothyroidism, HLD, PTSD and hx of recent PE s/p 1 week of eliquis, presented to the ED with SOB and was found to be tachypneic . He c/o orthopnea, PND. CTA was done to r/o PE as his hx was significant for recent PE and noncompliance with medication. Pt was admitted for dyspnea to r/o CHF exacerbation vs COPD exacerbation.
74 yo male exsmoker with COPD, hypothyroidism, HLD, PTSD and hx of recent PE s/p 1 week of eliquis, came with complains of difficulty breathing,Rt Orchitis..   1.ABX.  2.PE-on  eliquis.  3.Hypothyroidism-synthroid.  4.COPD-as per pulmonary.  5.Lipid d/o-statin.  6.PTSD-cont psych medication.  7.Aortic aneurysm-f/u CT in 6mo. No b blocker due to low bp.  8.PPI.
74 yo male exsmoker with COPD, hypothyroidism, HLD, PTSD and hx of recent PE s/p 1 week of eliquis, came with complains of difficulty breathing.   1.Stress test-R/O ischemia.  2.Obtain records from [place],cont eliquis for now.  3.Hypothyroidism-synthroid.  4.COPD-as per pulmonary.  5.Lipid d/o-statin.  6.PTSD-cont psych medication.  7.Aortic aneurysm-f/u CT in 6mo. No b blocker due to low bp.  8.PPI.
74 yo male exsmoker with COPD, hypothyroidism, HLD, PTSD and hx of recent PE s/p 1 week of eliquis, presented to the ED with SOB and was found to be tachypneic . He c/o orthopnea, PND. CTA was done to r/o PE as his hx was significant for recent PE and noncompliance with medication. Pt was admitted for dyspnea to r/o CHF exacerbation vs COPD exacerbation.
72 yo male exsmoker with COPD, hypothyroidism, HLD, PTSD and hx of recent PE s/p 1 week of eliquis, presented to the ED with SOB and was found to be tachypneic . He c/o orthopnea, PND. CTA was done to r/o PE as his hx was significant for recent PE and noncompliance with medication. Pt was admitted for dyspnea to r/o CHF exacerbation vs COPD exacerbation.

## 2019-08-08 NOTE — PROGRESS NOTE ADULT - PROBLEM/PLAN-5
DISPLAY PLAN FREE TEXT
77
DISPLAY PLAN FREE TEXT

## 2019-08-08 NOTE — PROGRESS NOTE ADULT - PROBLEM SELECTOR PLAN 1
Bronchodilators  Steroids  Oxygen supp  Echo noted.   Stress test negative.   F/u CXR  Spiriva  Pfts as OP.  D/C planning.

## 2019-08-13 ENCOUNTER — APPOINTMENT (OUTPATIENT)
Dept: OTOLARYNGOLOGY | Facility: CLINIC | Age: 74
End: 2019-08-13

## 2019-08-20 ENCOUNTER — APPOINTMENT (OUTPATIENT)
Dept: PULMONOLOGY | Facility: CLINIC | Age: 74
End: 2019-08-20

## 2019-09-14 ENCOUNTER — EMERGENCY (EMERGENCY)
Facility: HOSPITAL | Age: 74
LOS: 1 days | Discharge: HOME | End: 2019-09-14
Attending: EMERGENCY MEDICINE
Payer: MEDICARE

## 2019-09-14 VITALS
SYSTOLIC BLOOD PRESSURE: 139 MMHG | DIASTOLIC BLOOD PRESSURE: 79 MMHG | RESPIRATION RATE: 18 BRPM | HEART RATE: 64 BPM | OXYGEN SATURATION: 98 % | HEIGHT: 70 IN | TEMPERATURE: 98 F | WEIGHT: 210.1 LBS

## 2019-09-14 VITALS
RESPIRATION RATE: 18 BRPM | DIASTOLIC BLOOD PRESSURE: 72 MMHG | HEART RATE: 62 BPM | OXYGEN SATURATION: 98 % | SYSTOLIC BLOOD PRESSURE: 128 MMHG | TEMPERATURE: 98 F

## 2019-09-14 DIAGNOSIS — Z98.890 OTHER SPECIFIED POSTPROCEDURAL STATES: Chronic | ICD-10-CM

## 2019-09-14 LAB
ALBUMIN SERPL ELPH-MCNC: 3.3 G/DL — LOW (ref 3.5–5)
ALP SERPL-CCNC: 49 U/L — SIGNIFICANT CHANGE UP (ref 40–120)
ALT FLD-CCNC: 25 U/L DA — SIGNIFICANT CHANGE UP (ref 10–60)
ANION GAP SERPL CALC-SCNC: 4 MMOL/L — LOW (ref 5–17)
APTT BLD: 30.5 SEC — SIGNIFICANT CHANGE UP (ref 27.5–36.3)
AST SERPL-CCNC: 19 U/L — SIGNIFICANT CHANGE UP (ref 10–40)
BILIRUB SERPL-MCNC: 0.2 MG/DL — SIGNIFICANT CHANGE UP (ref 0.2–1.2)
BUN SERPL-MCNC: 17 MG/DL — SIGNIFICANT CHANGE UP (ref 7–18)
CALCIUM SERPL-MCNC: 8.2 MG/DL — LOW (ref 8.4–10.5)
CHLORIDE SERPL-SCNC: 110 MMOL/L — HIGH (ref 96–108)
CO2 SERPL-SCNC: 27 MMOL/L — SIGNIFICANT CHANGE UP (ref 22–31)
CREAT SERPL-MCNC: 1.19 MG/DL — SIGNIFICANT CHANGE UP (ref 0.5–1.3)
GLUCOSE SERPL-MCNC: 100 MG/DL — HIGH (ref 70–99)
HCT VFR BLD CALC: 40.9 % — SIGNIFICANT CHANGE UP (ref 39–50)
HGB BLD-MCNC: 13.7 G/DL — SIGNIFICANT CHANGE UP (ref 13–17)
INR BLD: 0.92 RATIO — SIGNIFICANT CHANGE UP (ref 0.88–1.16)
MCHC RBC-ENTMCNC: 31.3 PG — SIGNIFICANT CHANGE UP (ref 27–34)
MCHC RBC-ENTMCNC: 33.5 GM/DL — SIGNIFICANT CHANGE UP (ref 32–36)
MCV RBC AUTO: 93.4 FL — SIGNIFICANT CHANGE UP (ref 80–100)
NRBC # BLD: 0 /100 WBCS — SIGNIFICANT CHANGE UP (ref 0–0)
PLATELET # BLD AUTO: 181 K/UL — SIGNIFICANT CHANGE UP (ref 150–400)
POTASSIUM SERPL-MCNC: 3.6 MMOL/L — SIGNIFICANT CHANGE UP (ref 3.5–5.3)
POTASSIUM SERPL-SCNC: 3.6 MMOL/L — SIGNIFICANT CHANGE UP (ref 3.5–5.3)
PROT SERPL-MCNC: 6.6 G/DL — SIGNIFICANT CHANGE UP (ref 6–8.3)
PROTHROM AB SERPL-ACNC: 10.2 SEC — SIGNIFICANT CHANGE UP (ref 10–12.9)
RBC # BLD: 4.38 M/UL — SIGNIFICANT CHANGE UP (ref 4.2–5.8)
RBC # FLD: 14.6 % — HIGH (ref 10.3–14.5)
SODIUM SERPL-SCNC: 141 MMOL/L — SIGNIFICANT CHANGE UP (ref 135–145)
TROPONIN I SERPL-MCNC: <0.015 NG/ML — SIGNIFICANT CHANGE UP (ref 0–0.04)
WBC # BLD: 8.11 K/UL — SIGNIFICANT CHANGE UP (ref 3.8–10.5)
WBC # FLD AUTO: 8.11 K/UL — SIGNIFICANT CHANGE UP (ref 3.8–10.5)

## 2019-09-14 PROCEDURE — 99284 EMERGENCY DEPT VISIT MOD MDM: CPT | Mod: 25

## 2019-09-14 PROCEDURE — 71045 X-RAY EXAM CHEST 1 VIEW: CPT | Mod: 26

## 2019-09-14 PROCEDURE — 71045 X-RAY EXAM CHEST 1 VIEW: CPT

## 2019-09-14 PROCEDURE — 85610 PROTHROMBIN TIME: CPT

## 2019-09-14 PROCEDURE — 99284 EMERGENCY DEPT VISIT MOD MDM: CPT

## 2019-09-14 PROCEDURE — 85730 THROMBOPLASTIN TIME PARTIAL: CPT

## 2019-09-14 PROCEDURE — 93005 ELECTROCARDIOGRAM TRACING: CPT

## 2019-09-14 PROCEDURE — 84484 ASSAY OF TROPONIN QUANT: CPT

## 2019-09-14 PROCEDURE — 94640 AIRWAY INHALATION TREATMENT: CPT

## 2019-09-14 PROCEDURE — 85027 COMPLETE CBC AUTOMATED: CPT

## 2019-09-14 PROCEDURE — 96374 THER/PROPH/DIAG INJ IV PUSH: CPT

## 2019-09-14 PROCEDURE — 93010 ELECTROCARDIOGRAM REPORT: CPT | Mod: 76

## 2019-09-14 PROCEDURE — 80053 COMPREHEN METABOLIC PANEL: CPT

## 2019-09-14 PROCEDURE — 36415 COLL VENOUS BLD VENIPUNCTURE: CPT

## 2019-09-14 RX ORDER — IPRATROPIUM/ALBUTEROL SULFATE 18-103MCG
3 AEROSOL WITH ADAPTER (GRAM) INHALATION ONCE
Refills: 0 | Status: COMPLETED | OUTPATIENT
Start: 2019-09-14 | End: 2019-09-14

## 2019-09-14 RX ORDER — CLONAZEPAM 1 MG
0.25 TABLET ORAL ONCE
Refills: 0 | Status: DISCONTINUED | OUTPATIENT
Start: 2019-09-14 | End: 2019-09-14

## 2019-09-14 RX ORDER — APIXABAN 2.5 MG/1
5 TABLET, FILM COATED ORAL ONCE
Refills: 0 | Status: COMPLETED | OUTPATIENT
Start: 2019-09-14 | End: 2019-09-14

## 2019-09-14 RX ADMIN — APIXABAN 5 MILLIGRAM(S): 2.5 TABLET, FILM COATED ORAL at 16:55

## 2019-09-14 RX ADMIN — Medication 3 MILLILITER(S): at 16:02

## 2019-09-14 RX ADMIN — Medication 0.25 MILLIGRAM(S): at 17:05

## 2019-09-14 RX ADMIN — Medication 3 MILLILITER(S): at 16:55

## 2019-09-14 RX ADMIN — Medication 125 MILLIGRAM(S): at 16:02

## 2019-09-14 NOTE — ED ADULT NURSE NOTE - OBJECTIVE STATEMENT
pt is here for shortness of breath.  pt stated that worsening sob since a year, h/o mireille PE on Xarelto, c/o pain 5/10, denied fever or chills, denied N/V/D, pt calm at this time.

## 2019-09-14 NOTE — ED PROVIDER NOTE - PROGRESS NOTE DETAILS
Pt improved upon re-eval after nebs and Solumedrol.  VSS.  Pt compliant with Eliquis and therefore unlikely that symptoms are related to PE--suspect COPD exacerbation.  Pt has albuterol at home and will prescribe prednisone.  Advised strict return precautions and PMD f/u.

## 2019-09-14 NOTE — ED PROVIDER NOTE - CLINICAL SUMMARY MEDICAL DECISION MAKING FREE TEXT BOX
patient with history of COPD and PE on AC with shortness of breath and dry cough. Will do labs, EKG, Chest XRAY will treat with nebulizer, IV and Solumedrol and reassess.

## 2019-09-14 NOTE — ED PROVIDER NOTE - PATIENT PORTAL LINK FT
You can access the FollowMyHealth Patient Portal offered by [facility] by registering at the following website: [email or web address]. By joining [facility]’s FollowMyHealth portal, you will also be able to view your health information using other applications (apps) compatible with our system.

## 2019-09-14 NOTE — ED PROVIDER NOTE - OBJECTIVE STATEMENT
74 y/o male with a PMHx of CAD, chronic GERD, COPD with asthma which he related to chemical inhalation exposure from 9/11, PE, HLD, hypothyroidism, anxiety, and PTSD with a PSHx of a thyroidectomy presents to the ER c/o shortness of breath associated with dry cough and generalized weakness and dizziness for the past x2 days. Patient regards having similar symptoms but they have chronically worsened recently . Patient denies any chest pain, fever, chills or any other acute complaints. NKDA.

## 2019-09-14 NOTE — ED PROVIDER NOTE - PMH
Anxiety    Chronic GERD    COPD with asthma    History of pulmonary embolus (PE)    HLD (hyperlipidemia)    Hypothyroidism    Post traumatic stress disorder (PTSD)

## 2019-09-17 ENCOUNTER — APPOINTMENT (OUTPATIENT)
Dept: PSYCHIATRY | Facility: CLINIC | Age: 74
End: 2019-09-17

## 2019-09-17 ENCOUNTER — INPATIENT (INPATIENT)
Facility: HOSPITAL | Age: 74
LOS: 1 days | Discharge: HOME | End: 2019-09-19
Attending: STUDENT IN AN ORGANIZED HEALTH CARE EDUCATION/TRAINING PROGRAM | Admitting: STUDENT IN AN ORGANIZED HEALTH CARE EDUCATION/TRAINING PROGRAM
Payer: MEDICARE

## 2019-09-17 VITALS
DIASTOLIC BLOOD PRESSURE: 64 MMHG | RESPIRATION RATE: 17 BRPM | OXYGEN SATURATION: 98 % | SYSTOLIC BLOOD PRESSURE: 120 MMHG | TEMPERATURE: 99 F | HEART RATE: 67 BPM

## 2019-09-17 DIAGNOSIS — J44.1 CHRONIC OBSTRUCTIVE PULMONARY DISEASE WITH (ACUTE) EXACERBATION: ICD-10-CM

## 2019-09-17 DIAGNOSIS — Z29.9 ENCOUNTER FOR PROPHYLACTIC MEASURES, UNSPECIFIED: ICD-10-CM

## 2019-09-17 DIAGNOSIS — Z98.890 OTHER SPECIFIED POSTPROCEDURAL STATES: Chronic | ICD-10-CM

## 2019-09-17 DIAGNOSIS — R06.02 SHORTNESS OF BREATH: ICD-10-CM

## 2019-09-17 DIAGNOSIS — F41.9 ANXIETY DISORDER, UNSPECIFIED: ICD-10-CM

## 2019-09-17 DIAGNOSIS — R74.8 ABNORMAL LEVELS OF OTHER SERUM ENZYMES: ICD-10-CM

## 2019-09-17 DIAGNOSIS — I48.2 CHRONIC ATRIAL FIBRILLATION: ICD-10-CM

## 2019-09-17 DIAGNOSIS — F43.10 POST-TRAUMATIC STRESS DISORDER, UNSPECIFIED: ICD-10-CM

## 2019-09-17 DIAGNOSIS — E03.9 HYPOTHYROIDISM, UNSPECIFIED: ICD-10-CM

## 2019-09-17 DIAGNOSIS — E78.5 HYPERLIPIDEMIA, UNSPECIFIED: ICD-10-CM

## 2019-09-17 DIAGNOSIS — I26.99 OTHER PULMONARY EMBOLISM WITHOUT ACUTE COR PULMONALE: ICD-10-CM

## 2019-09-17 LAB
ALBUMIN SERPL ELPH-MCNC: 4.2 G/DL — SIGNIFICANT CHANGE UP (ref 3.3–5)
ALP SERPL-CCNC: 47 U/L — SIGNIFICANT CHANGE UP (ref 40–120)
ALT FLD-CCNC: 25 U/L — SIGNIFICANT CHANGE UP (ref 4–41)
ANION GAP SERPL CALC-SCNC: 13 MMO/L — SIGNIFICANT CHANGE UP (ref 7–14)
APTT BLD: 25.6 SEC — LOW (ref 27.5–36.3)
AST SERPL-CCNC: 27 U/L — SIGNIFICANT CHANGE UP (ref 4–40)
B PERT DNA SPEC QL NAA+PROBE: NOT DETECTED — SIGNIFICANT CHANGE UP
BASE EXCESS BLDV CALC-SCNC: 4.3 MMOL/L — SIGNIFICANT CHANGE UP
BASOPHILS # BLD AUTO: 0.02 K/UL — SIGNIFICANT CHANGE UP (ref 0–0.2)
BASOPHILS NFR BLD AUTO: 0.1 % — SIGNIFICANT CHANGE UP (ref 0–2)
BILIRUB SERPL-MCNC: 0.5 MG/DL — SIGNIFICANT CHANGE UP (ref 0.2–1.2)
BLOOD GAS VENOUS - CREATININE: 1.13 MG/DL — SIGNIFICANT CHANGE UP (ref 0.5–1.3)
BLOOD GAS VENOUS - FIO2: 21 — SIGNIFICANT CHANGE UP
BUN SERPL-MCNC: 26 MG/DL — HIGH (ref 7–23)
C PNEUM DNA SPEC QL NAA+PROBE: NOT DETECTED — SIGNIFICANT CHANGE UP
CALCIUM SERPL-MCNC: 8.6 MG/DL — SIGNIFICANT CHANGE UP (ref 8.4–10.5)
CHLORIDE BLDV-SCNC: 105 MMOL/L — SIGNIFICANT CHANGE UP (ref 96–108)
CHLORIDE SERPL-SCNC: 98 MMOL/L — SIGNIFICANT CHANGE UP (ref 98–107)
CK MB BLD-MCNC: 2.9 — HIGH (ref 0–2.5)
CK MB BLD-MCNC: 6.74 NG/ML — HIGH (ref 1–6.6)
CK MB BLD-MCNC: 7.77 NG/ML — HIGH (ref 1–6.6)
CK SERPL-CCNC: 206 U/L — HIGH (ref 30–200)
CK SERPL-CCNC: 271 U/L — HIGH (ref 30–200)
CO2 SERPL-SCNC: 26 MMOL/L — SIGNIFICANT CHANGE UP (ref 22–31)
CREAT SERPL-MCNC: 1.21 MG/DL — SIGNIFICANT CHANGE UP (ref 0.5–1.3)
EOSINOPHIL # BLD AUTO: 0.04 K/UL — SIGNIFICANT CHANGE UP (ref 0–0.5)
EOSINOPHIL NFR BLD AUTO: 0.2 % — SIGNIFICANT CHANGE UP (ref 0–6)
FLUAV H1 2009 PAND RNA SPEC QL NAA+PROBE: NOT DETECTED — SIGNIFICANT CHANGE UP
FLUAV H1 RNA SPEC QL NAA+PROBE: NOT DETECTED — SIGNIFICANT CHANGE UP
FLUAV H3 RNA SPEC QL NAA+PROBE: NOT DETECTED — SIGNIFICANT CHANGE UP
FLUAV SUBTYP SPEC NAA+PROBE: NOT DETECTED — SIGNIFICANT CHANGE UP
FLUBV RNA SPEC QL NAA+PROBE: NOT DETECTED — SIGNIFICANT CHANGE UP
GAS PNL BLDV: 135 MMOL/L — LOW (ref 136–146)
GLUCOSE BLDV-MCNC: 92 MG/DL — SIGNIFICANT CHANGE UP (ref 70–99)
GLUCOSE SERPL-MCNC: 94 MG/DL — SIGNIFICANT CHANGE UP (ref 70–99)
HADV DNA SPEC QL NAA+PROBE: NOT DETECTED — SIGNIFICANT CHANGE UP
HCO3 BLDV-SCNC: 26 MMOL/L — SIGNIFICANT CHANGE UP (ref 20–27)
HCOV PNL SPEC NAA+PROBE: SIGNIFICANT CHANGE UP
HCT VFR BLD CALC: 47 % — SIGNIFICANT CHANGE UP (ref 39–50)
HCT VFR BLDV CALC: 47.9 % — SIGNIFICANT CHANGE UP (ref 39–51)
HGB BLD-MCNC: 15 G/DL — SIGNIFICANT CHANGE UP (ref 13–17)
HGB BLDV-MCNC: 15.6 G/DL — SIGNIFICANT CHANGE UP (ref 13–17)
HMPV RNA SPEC QL NAA+PROBE: NOT DETECTED — SIGNIFICANT CHANGE UP
HPIV1 RNA SPEC QL NAA+PROBE: NOT DETECTED — SIGNIFICANT CHANGE UP
HPIV2 RNA SPEC QL NAA+PROBE: NOT DETECTED — SIGNIFICANT CHANGE UP
HPIV3 RNA SPEC QL NAA+PROBE: NOT DETECTED — SIGNIFICANT CHANGE UP
HPIV4 RNA SPEC QL NAA+PROBE: NOT DETECTED — SIGNIFICANT CHANGE UP
IMM GRANULOCYTES NFR BLD AUTO: 0.3 % — SIGNIFICANT CHANGE UP (ref 0–1.5)
INR BLD: 0.97 — SIGNIFICANT CHANGE UP (ref 0.88–1.17)
LACTATE BLDV-MCNC: 1.7 MMOL/L — SIGNIFICANT CHANGE UP (ref 0.5–2)
LYMPHOCYTES # BLD AUTO: 27.7 % — SIGNIFICANT CHANGE UP (ref 13–44)
LYMPHOCYTES # BLD AUTO: 4.84 K/UL — HIGH (ref 1–3.3)
MAGNESIUM SERPL-MCNC: 2 MG/DL — SIGNIFICANT CHANGE UP (ref 1.6–2.6)
MCHC RBC-ENTMCNC: 30.4 PG — SIGNIFICANT CHANGE UP (ref 27–34)
MCHC RBC-ENTMCNC: 31.9 % — LOW (ref 32–36)
MCV RBC AUTO: 95.3 FL — SIGNIFICANT CHANGE UP (ref 80–100)
MONOCYTES # BLD AUTO: 1.28 K/UL — HIGH (ref 0–0.9)
MONOCYTES NFR BLD AUTO: 7.3 % — SIGNIFICANT CHANGE UP (ref 2–14)
NEUTROPHILS # BLD AUTO: 11.24 K/UL — HIGH (ref 1.8–7.4)
NEUTROPHILS NFR BLD AUTO: 64.4 % — SIGNIFICANT CHANGE UP (ref 43–77)
NRBC # FLD: 0 K/UL — SIGNIFICANT CHANGE UP (ref 0–0)
NT-PROBNP SERPL-SCNC: 911.7 PG/ML — SIGNIFICANT CHANGE UP
PCO2 BLDV: 45 MMHG — SIGNIFICANT CHANGE UP (ref 41–51)
PH BLDV: 7.42 PH — SIGNIFICANT CHANGE UP (ref 7.32–7.43)
PHOSPHATE SERPL-MCNC: 2.5 MG/DL — SIGNIFICANT CHANGE UP (ref 2.5–4.5)
PLATELET # BLD AUTO: 203 K/UL — SIGNIFICANT CHANGE UP (ref 150–400)
PMV BLD: 9.8 FL — SIGNIFICANT CHANGE UP (ref 7–13)
PO2 BLDV: < 24 MMHG — LOW (ref 35–40)
POTASSIUM BLDV-SCNC: 3.5 MMOL/L — SIGNIFICANT CHANGE UP (ref 3.4–4.5)
POTASSIUM SERPL-MCNC: 3.7 MMOL/L — SIGNIFICANT CHANGE UP (ref 3.5–5.3)
POTASSIUM SERPL-SCNC: 3.7 MMOL/L — SIGNIFICANT CHANGE UP (ref 3.5–5.3)
PROT SERPL-MCNC: 7.1 G/DL — SIGNIFICANT CHANGE UP (ref 6–8.3)
PROTHROM AB SERPL-ACNC: 10.8 SEC — SIGNIFICANT CHANGE UP (ref 9.8–13.1)
RBC # BLD: 4.93 M/UL — SIGNIFICANT CHANGE UP (ref 4.2–5.8)
RBC # FLD: 14.9 % — HIGH (ref 10.3–14.5)
RSV RNA SPEC QL NAA+PROBE: NOT DETECTED — SIGNIFICANT CHANGE UP
RV+EV RNA SPEC QL NAA+PROBE: NOT DETECTED — SIGNIFICANT CHANGE UP
SAO2 % BLDV: 23.1 % — LOW (ref 60–85)
SODIUM SERPL-SCNC: 137 MMOL/L — SIGNIFICANT CHANGE UP (ref 135–145)
TROPONIN T, HIGH SENSITIVITY: 59 NG/L — CRITICAL HIGH (ref ?–14)
TROPONIN T, HIGH SENSITIVITY: 61 NG/L — CRITICAL HIGH (ref ?–14)
TROPONIN T, HIGH SENSITIVITY: 66 NG/L — CRITICAL HIGH (ref ?–14)
TSH SERPL-MCNC: 5.89 UIU/ML — HIGH (ref 0.27–4.2)
WBC # BLD: 17.48 K/UL — HIGH (ref 3.8–10.5)
WBC # FLD AUTO: 17.48 K/UL — HIGH (ref 3.8–10.5)

## 2019-09-17 PROCEDURE — 71046 X-RAY EXAM CHEST 2 VIEWS: CPT | Mod: 26

## 2019-09-17 PROCEDURE — 71275 CT ANGIOGRAPHY CHEST: CPT | Mod: 26

## 2019-09-17 PROCEDURE — 99223 1ST HOSP IP/OBS HIGH 75: CPT

## 2019-09-17 RX ORDER — LEVOTHYROXINE SODIUM 125 MCG
150 TABLET ORAL DAILY
Refills: 0 | Status: DISCONTINUED | OUTPATIENT
Start: 2019-09-17 | End: 2019-09-19

## 2019-09-17 RX ORDER — TIOTROPIUM BROMIDE 18 UG/1
1 CAPSULE ORAL; RESPIRATORY (INHALATION) DAILY
Refills: 0 | Status: DISCONTINUED | OUTPATIENT
Start: 2019-09-17 | End: 2019-09-19

## 2019-09-17 RX ORDER — FLUTICASONE PROPIONATE 50 MCG
1 SPRAY, SUSPENSION NASAL
Qty: 0 | Refills: 0 | DISCHARGE

## 2019-09-17 RX ORDER — AZITHROMYCIN 500 MG/1
500 TABLET, FILM COATED ORAL EVERY 24 HOURS
Refills: 0 | Status: DISCONTINUED | OUTPATIENT
Start: 2019-09-17 | End: 2019-09-17

## 2019-09-17 RX ORDER — BUPROPION HYDROCHLORIDE 150 MG/1
150 TABLET, EXTENDED RELEASE ORAL DAILY
Refills: 0 | Status: DISCONTINUED | OUTPATIENT
Start: 2019-09-17 | End: 2019-09-17

## 2019-09-17 RX ORDER — CLONAZEPAM 1 MG
1 TABLET ORAL
Qty: 0 | Refills: 0 | DISCHARGE

## 2019-09-17 RX ORDER — SIMVASTATIN 20 MG/1
1 TABLET, FILM COATED ORAL
Qty: 0 | Refills: 0 | DISCHARGE

## 2019-09-17 RX ORDER — TRAZODONE HCL 50 MG
150 TABLET ORAL AT BEDTIME
Refills: 0 | Status: DISCONTINUED | OUTPATIENT
Start: 2019-09-17 | End: 2019-09-19

## 2019-09-17 RX ORDER — ASPIRIN/CALCIUM CARB/MAGNESIUM 324 MG
81 TABLET ORAL DAILY
Refills: 0 | Status: DISCONTINUED | OUTPATIENT
Start: 2019-09-17 | End: 2019-09-19

## 2019-09-17 RX ORDER — TRAZODONE HCL 50 MG
1 TABLET ORAL
Qty: 0 | Refills: 0 | DISCHARGE

## 2019-09-17 RX ORDER — ARIPIPRAZOLE 15 MG/1
1 TABLET ORAL
Qty: 0 | Refills: 0 | DISCHARGE

## 2019-09-17 RX ORDER — BUPROPION HYDROCHLORIDE 150 MG/1
1 TABLET, EXTENDED RELEASE ORAL
Qty: 0 | Refills: 0 | DISCHARGE

## 2019-09-17 RX ORDER — CITALOPRAM 10 MG/1
1 TABLET, FILM COATED ORAL
Qty: 0 | Refills: 0 | DISCHARGE

## 2019-09-17 RX ORDER — BUPROPION HYDROCHLORIDE 150 MG/1
300 TABLET, EXTENDED RELEASE ORAL DAILY
Refills: 0 | Status: DISCONTINUED | OUTPATIENT
Start: 2019-09-17 | End: 2019-09-19

## 2019-09-17 RX ORDER — INFLUENZA VIRUS VACCINE 15; 15; 15; 15 UG/.5ML; UG/.5ML; UG/.5ML; UG/.5ML
0.5 SUSPENSION INTRAMUSCULAR ONCE
Refills: 0 | Status: DISCONTINUED | OUTPATIENT
Start: 2019-09-17 | End: 2019-09-19

## 2019-09-17 RX ORDER — ARIPIPRAZOLE 15 MG/1
2 TABLET ORAL DAILY
Refills: 0 | Status: DISCONTINUED | OUTPATIENT
Start: 2019-09-17 | End: 2019-09-19

## 2019-09-17 RX ORDER — LEVOTHYROXINE SODIUM 125 MCG
175 TABLET ORAL DAILY
Refills: 0 | Status: DISCONTINUED | OUTPATIENT
Start: 2019-09-17 | End: 2019-09-17

## 2019-09-17 RX ORDER — AZITHROMYCIN 500 MG/1
500 TABLET, FILM COATED ORAL EVERY 24 HOURS
Refills: 0 | Status: DISCONTINUED | OUTPATIENT
Start: 2019-09-18 | End: 2019-09-19

## 2019-09-17 RX ORDER — IPRATROPIUM/ALBUTEROL SULFATE 18-103MCG
3 AEROSOL WITH ADAPTER (GRAM) INHALATION ONCE
Refills: 0 | Status: COMPLETED | OUTPATIENT
Start: 2019-09-17 | End: 2019-09-17

## 2019-09-17 RX ORDER — CLONAZEPAM 1 MG
0.25 TABLET ORAL
Qty: 0 | Refills: 0 | DISCHARGE

## 2019-09-17 RX ORDER — LEVOTHYROXINE SODIUM 125 MCG
1 TABLET ORAL
Qty: 0 | Refills: 0 | DISCHARGE

## 2019-09-17 RX ORDER — HEPARIN SODIUM 5000 [USP'U]/ML
5000 INJECTION INTRAVENOUS; SUBCUTANEOUS EVERY 12 HOURS
Refills: 0 | Status: DISCONTINUED | OUTPATIENT
Start: 2019-09-17 | End: 2019-09-18

## 2019-09-17 RX ORDER — LEVOTHYROXINE SODIUM 125 MCG
150 TABLET ORAL DAILY
Refills: 0 | Status: DISCONTINUED | OUTPATIENT
Start: 2019-09-17 | End: 2019-09-17

## 2019-09-17 RX ORDER — ATORVASTATIN CALCIUM 80 MG/1
40 TABLET, FILM COATED ORAL AT BEDTIME
Refills: 0 | Status: DISCONTINUED | OUTPATIENT
Start: 2019-09-17 | End: 2019-09-19

## 2019-09-17 RX ORDER — AZITHROMYCIN 500 MG/1
500 TABLET, FILM COATED ORAL ONCE
Refills: 0 | Status: COMPLETED | OUTPATIENT
Start: 2019-09-17 | End: 2019-09-17

## 2019-09-17 RX ORDER — CEFTRIAXONE 500 MG/1
1000 INJECTION, POWDER, FOR SOLUTION INTRAMUSCULAR; INTRAVENOUS ONCE
Refills: 0 | Status: COMPLETED | OUTPATIENT
Start: 2019-09-17 | End: 2019-09-17

## 2019-09-17 RX ORDER — CLONAZEPAM 1 MG
0.25 TABLET ORAL DAILY
Refills: 0 | Status: DISCONTINUED | OUTPATIENT
Start: 2019-09-17 | End: 2019-09-18

## 2019-09-17 RX ORDER — ALBUTEROL 90 UG/1
2 AEROSOL, METERED ORAL
Qty: 0 | Refills: 0 | DISCHARGE

## 2019-09-17 RX ORDER — ALBUTEROL 90 UG/1
2 AEROSOL, METERED ORAL EVERY 6 HOURS
Refills: 0 | Status: DISCONTINUED | OUTPATIENT
Start: 2019-09-17 | End: 2019-09-19

## 2019-09-17 RX ORDER — FLUTICASONE PROPIONATE 50 MCG
1 SPRAY, SUSPENSION NASAL
Refills: 0 | Status: DISCONTINUED | OUTPATIENT
Start: 2019-09-17 | End: 2019-09-19

## 2019-09-17 RX ADMIN — Medication 3 MILLILITER(S): at 12:10

## 2019-09-17 RX ADMIN — CEFTRIAXONE 100 MILLIGRAM(S): 500 INJECTION, POWDER, FOR SOLUTION INTRAMUSCULAR; INTRAVENOUS at 17:33

## 2019-09-17 RX ADMIN — Medication 150 MILLIGRAM(S): at 22:07

## 2019-09-17 RX ADMIN — AZITHROMYCIN 250 MILLIGRAM(S): 500 TABLET, FILM COATED ORAL at 19:24

## 2019-09-17 RX ADMIN — ATORVASTATIN CALCIUM 40 MILLIGRAM(S): 80 TABLET, FILM COATED ORAL at 22:07

## 2019-09-17 RX ADMIN — Medication 1 MILLIGRAM(S): at 14:30

## 2019-09-17 RX ADMIN — Medication 125 MILLIGRAM(S): at 17:33

## 2019-09-17 RX ADMIN — CEFTRIAXONE 1000 MILLIGRAM(S): 500 INJECTION, POWDER, FOR SOLUTION INTRAMUSCULAR; INTRAVENOUS at 19:24

## 2019-09-17 NOTE — H&P ADULT - PROBLEM SELECTOR PLAN 9
Continue with Trazadone, Clonazepam, Abilify and Wellbutrin daily   Consider Psychiatry consult in am

## 2019-09-17 NOTE — CONSULT NOTE ADULT - SUBJECTIVE AND OBJECTIVE BOX
CHIEF COMPLAINT: sob     HISTORY OF PRESENT ILLNESS:74 yo M c PMH of COPD (not on home O2),CAD, chronic GERD, COPD with asthma which he related to chemical inhalation exposure from 9/11 , HLD, hypothyroidism,PTSD, anxiety, melanoma (h/o surgical resection of muscles of L chest wll), PE and afib (on eliquis) p/w 2 week h/o worsening SOB, orthopnea, PND, and WATERS. states like he has had the same feeling of his throat being closed off for "years" that is now unchanged. has not taken eliquis x 4 days because was unable to get prescription renewed. Pateint had multiple admission and Ed visit for shortness of breath in this year. Most recent admission was in [facility] from 8/2/19 to 8/8/15 .He was treated with COPD exacerbation  .He was also followed  by cardiologist there  and hade echo/nuclear stress was performed ( see result below)   Dr. Brumfield who said do not give asa or tx like nstemi no meds for high trop just trend and pt can f/u outpt with [facility]    alcohol (Vomiting)  No Known Drug Allergies    Intolerances    	    MEDICATIONS:    predniSONE 50 mg oral tablet: 1 tab(s) orally once a day   · 	Eliquis 5 mg oral tablet: 1 tab(s) orally 2 times a day   · 	apixaban 5 mg oral tablet: 1 tab(s) orally every 12 hours  · 	citalopram 20 mg oral tablet: 1 tab(s) orally once a day  · 	levothyroxine 175 mcg (0.175 mg) oral tablet: 1 tab(s) orally once a day  · 	buPROPion 300 mg/24 hours (XL) oral tablet, extended release: 1 tab(s) orally every 24 hours  · 	traZODone 150 mg oral tablet: 1 tab(s) orally once a day (at bedtime)  · 	clonazePAM 0.25 mg oral tablet: 0.25 milligram(s) orally once a day  · 	simvastatin 80 mg oral tablet: 1 tab(s) orally once a day (at bedtime)  · 	fluticasone 50 mcg/inh nasal spray: 1 spray(s) nasal once      PAST MEDICAL & SURGICAL HISTORY:  Chronic GERD  COPD with asthma  Post traumatic stress disorder (PTSD)  HLD (hyperlipidemia)  History of pulmonary embolus (PE)  Hypothyroidism  Anxiety  S/P thyroidectomy      FAMILY HISTORY:  FH: Alzheimers disease      SOCIAL HISTORY:    [ ] live with:  [ ] Non-smoker,[ ] smoker    [ ] no alcohol use [ ] alcohol use:      REVIEW OF SYSTEMS:  General: no fatigue/malaise, weight loss/gain.  Skin: no rashes.  Ophthalmologic: no blurred vision, no loss of vision. 	  ENT: no sore throat, rhinorrhea, sinus congestion.  Cardiovascular:no chest pain ,no palpitation,no dizziness,no diaphoresis,no edema  Respiratory: no SOB, cough or wheeze.  Gastrointestinal:  no N/V/D, no melena/hematemesis/hematochezia.  Genitourinary: no dysuria/hesitancy or hematuria.  Musculoskeletal: no myalgias or arthralgias.  Neurological: no changes in vision or hearing, no lightheadedness/dizziness, no syncope/near syncope	  Psychiatric: no unusual stress/anxiety.       PHYSICAL EXAM:  T(C): 36.7 (09-17-19 @ 19:22), Max: 37 (09-17-19 @ 11:19)  HR: 64 (09-17-19 @ 19:22) (58 - 71)  BP: 105/53 (09-17-19 @ 19:22) (102/49 - 120/64)  RR: 15 (09-17-19 @ 19:22) (15 - 20)  SpO2: 98% (09-17-19 @ 19:22) (98% - 100%)  Wt(kg): --  I&O's Summary      Appearance: Normal	  HEENT:   Normal oral mucosa, PERRL, EOMI	  Lymphatic: No lymphadenopathy  Cardiovascular: Normal S1 S2, No JVD, No murmurs, No edema  Respiratory: Lungs clear to auscultation	  Psychiatry: A & O x 3, Mood & affect appropriate  Gastrointestinal:  Soft, Non-tender, + BS	  Skin: No rashes, No ecchymoses, No cyanosis	  Neurologic: Non-focal  Extremities: Normal range of motion, No clubbing, cyanosis or edema  Vascular: Peripheral pulses palpable 2+ bilaterally        LABS:	 	    CBC Full  -  ( 17 Sep 2019 11:55 )  WBC Count : 17.48 K/uL  Hemoglobin : 15.0 g/dL  Hematocrit : 47.0 %  Platelet Count - Automated : 203 K/uL  Mean Cell Volume : 95.3 fL  Mean Cell Hemoglobin : 30.4 pg  Mean Cell Hemoglobin Concentration : 31.9 %  Auto Neutrophil # : 11.24 K/uL  Auto Lymphocyte # : 4.84 K/uL  Auto Monocyte # : 1.28 K/uL  Auto Eosinophil # : 0.04 K/uL  Auto Basophil # : 0.02 K/uL  Auto Neutrophil % : 64.4 %  Auto Lymphocyte % : 27.7 %  Auto Monocyte % : 7.3 %  Auto Eosinophil % : 0.2 %  Auto Basophil % : 0.1 %    09-17    137  |  98  |  26<H>  ----------------------------<  94  3.7   |  26  |  1.21    Ca    8.6      17 Sep 2019 11:55  Phos  2.5     09-17  Mg     2.0     09-17    TPro  7.1  /  Alb  4.2  /  TBili  0.5  /  DBili  x   /  AST  27  /  ALT  25  /  AlkPhos  47  09-17      proBNP: Serum Pro-Brain Natriuretic Peptide: 911.7 pg/mL (09-17 @ 11:55)    Lipid Profile:   HgA1c:   TSH: Thyroid Stimulating Hormone, Serum: 5.89 uIU/mL (09-17 @ 11:55)      High sensitive trop      CARDIAC MARKERS:      CKMB: 7.77 ng/mL (09-17 @ 13:40)    CKMB Relative Index: 2.9 (09-17 @ 13:40)    High sensitive trop      TELEMETRY: 	    ECG:  	  RADIOLOGY:  OTHER: 	    PREVIOUS DIAGNOSTIC TESTING:    [ ] Echocardiogram:  [ ]  Catheterization:  [ ] Stress Test:  	  	  ASSESSMENT/PLAN: CHIEF COMPLAINT: sob     HISTORY OF PRESENT ILLNESS:74 yo M PMH of ex smoker COPD (not on home O2),CAD, aortic aneurysm ,chronic GERD, COPD with asthma which he related to chemical inhalation exposure from 9/11 , HLD, hypothyroidism, PTSD, anxiety, melanoma (h/o surgical resection of muscles of L chest wll), PE and afib (on Eliquis right orchitis , h/o nonadherence to medication and follow ups p/w 2 week h/o worsening SOB, orthopnea, PND, and WATERS. Patient states like he has had the same feeling of his throat being closed off for "years" that is now unchanged. Has not taken eliquis x 4 days because was unable to get prescription renewed. Patient had multiple admission and ED visit for shortness of breath in this year. Most recent admission was in [facility] from 8/2/19 to 8/8/15 .He was treated with COPD exacerbation  .He was seen by multi disciplinary teams . Urology for right orchitis ,Pulm for COPD exacerbation ,Endo for elevated TSH. He was also followed  by cardiologist there  and ischemia was r/o by echo/nuclear stress  ( see result below) .IN ED he received Zithromax and ceftriaxone  and neb treatment for COPD exacerbation  Lab ; wbc 17.48 K/uL,auto  neutropil 11.24, HsTrop 59<< 66, ,CKMB 2.71,RI 2.9  Vital signs : 105/59,HR 64,RR15,temp 98F,sat 98%  in RA    ED called Dr. Brumfield  ( pt's cardiologist )who said do not give asa or tx like nstemi no meds for high trop just trend and pt can f/u outpt with her given recent normal stress test in August    Allergies:  alcohol (Vomiting)  No Known Drug Allergies        	    MEDICATIONS:    predniSONE 50 mg oral tablet: 1 tab(s) orally once a day   · 	Eliquis 5 mg oral tablet: 1 tab(s) orally 2 times a day   · 	apixaban 5 mg oral tablet: 1 tab(s) orally every 12 hours  · 	citalopram 20 mg oral tablet: 1 tab(s) orally once a day  · 	levothyroxine 175 mcg (0.175 mg) oral tablet: 1 tab(s) orally once a day  · 	buPROPion 300 mg/24 hours (XL) oral tablet, extended release: 1 tab(s) orally every 24 hours  · 	traZODone 150 mg oral tablet: 1 tab(s) orally once a day (at bedtime)  · 	clonazePAM 0.25 mg oral tablet: 0.25 milligram(s) orally once a day  · 	simvastatin 80 mg oral tablet: 1 tab(s) orally once a day (at bedtime)  · 	fluticasone 50 mcg/inh nasal spray: 1 spray(s) nasal once      PAST MEDICAL & SURGICAL HISTORY:  Chronic GERD  COPD with asthma  Post traumatic stress disorder (PTSD)  HLD (hyperlipidemia)  History of pulmonary embolus (PE)  Hypothyroidism  Anxiety  S/P thyroidectomy      FAMILY HISTORY:  FH: Alzheimers disease      SOCIAL HISTORY:    [ ] live with:  [ ] Non-smoker,[x ] former smoker    [x ] no alcohol use [ ] alcohol use:      REVIEW OF SYSTEMS:  General: no fatigue/malaise, weight loss/gain.  Skin: no rashes.  Ophthalmologic: no blurred vision, no loss of vision. 	  ENT: no sore throat, rhinorrhea, sinus congestion.  Cardiovascular :no chest pain ,no palpitation, no dizziness, no diaphoresis, no edema  Respiratory: + SOB, cough or wheeze , +orthopnea, +WATERS  Gastrointestinal:  no N/V/D, no melena/hematemesis/hematochezia.  Genitourinary: no dysuria/hesitancy or hematuria.  Musculoskeletal: no myalgias or arthralgias.  Neurological: no changes in vision or hearing, no lightheadedness/dizziness, no syncope/near syncope	  Psychiatric: no unusual stress/anxiety.       PHYSICAL EXAM:  T(C): 36.7 (09-17-19 @ 19:22), Max: 37 (09-17-19 @ 11:19)  HR: 64 (09-17-19 @ 19:22) (58 - 71)  BP: 105/53 (09-17-19 @ 19:22) (102/49 - 120/64)  RR: 15 (09-17-19 @ 19:22) (15 - 20)  SpO2: 98% (09-17-19 @ 19:22) (98% - 100%)        Appearance: Normal	  HEENT:   Normal oral mucosa, PERRL, EOMI	  Lymphatic: No lymphadenopathy  Cardiovascular: Normal S1 S2, No JVD, No murmurs, No edema  Respiratory: Lungs clear to auscultation	  Psychiatry: A & O x 3, Mood & affect appropriate  Gastrointestinal:  Soft, Non-tender, + BS	  Skin: No rashes, No ecchymoses, No cyanosis	  Neurologic: Non-focal  Extremities: Normal range of motion, No clubbing, cyanosis or edema  Vascular: Peripheral pulses palpable 2+ bilaterally        LABS:	 	    CBC Full  -  ( 17 Sep 2019 11:55 )  WBC Count : 17.48 K/uL  Hemoglobin : 15.0 g/dL  Hematocrit : 47.0 %  Platelet Count - Automated : 203 K/uL  Mean Cell Volume : 95.3 fL  Mean Cell Hemoglobin : 30.4 pg  Mean Cell Hemoglobin Concentration : 31.9 %  Auto Neutrophil # : 11.24 K/uL  Auto Lymphocyte # : 4.84 K/uL  Auto Monocyte # : 1.28 K/uL  Auto Eosinophil # : 0.04 K/uL  Auto Basophil # : 0.02 K/uL  Auto Neutrophil % : 64.4 %  Auto Lymphocyte % : 27.7 %  Auto Monocyte % : 7.3 %  Auto Eosinophil % : 0.2 %  Auto Basophil % : 0.1 %    09-17    137  |  98  |  26<H>  ----------------------------<  94  3.7   |  26  |  1.21    Ca    8.6      17 Sep 2019 11:55  Phos  2.5     09-17  Mg     2.0     09-17    TPro  7.1  /  Alb  4.2  /  TBili  0.5  /  DBili  x   /  AST  27  /  ALT  25  /  AlkPhos  47  09-17      proBNP: Serum Pro-Brain Natriuretic Peptide: 911.7 pg/mL (09-17 @ 11:55)    Lipid Profile:   HgA1c:   TSH: Thyroid Stimulating Hormone, Serum: 5.89 uIU/mL (09-17 @ 11:55)      CARDIAC MARKERS:      CKMB: 7.77 ng/mL (09-17 @ 13:40)    CKMB Relative Index: 2.9 (09-17 @ 13:40)    High sensitive trop: 59<<66       	    ECG:  	  RADIOLOGY:  OTHER: < from: CT Angio Chest w/ IV Cont (09.17.19 @ 15:21) >  No pulmonary embolism.Atherosclerotic disease of the coronary arteries.< from: CT Angio Chest w/ IV Cont (09.17.19 @ 15:21) >  therosclerotic disease of the aorta and coronary arteries. Enlarged ascending aorta measuring 4.0 cm at the main pulmonary artery, unchanged.            	    PREVIOUS DIAGNOSTIC TESTING:    [ ] Echocardiogram:< from: Transthoracic Echocardiogram (08.03.19 @ 06:48) >  Normal mitral valve. Mild mitral regurgitation.2. Normal trileaflet aortic valve. Mild aortic regurgitation.3. Mild aortic root dilatation, 4. Severely dilated left atrium.  5. Eccentric left ventricular hypertrophy (dilated leftventricle with normal relative wall thickness).6. Normal Left Ventricular Systolic Function,  (EF = 55 to60%)7. Grade II diastolic dysfunction.8. Normal right atrium.9. Normal right ventricular size and systolic function (TAPSE  2.1cm).10. RV systolic pressure is moderately increased at  46 mmHg.11. There is mild tricuspid regurgitation.12. There is mild pulmonic regurgitation.13. Trivial pericardial effusion is seen.        [ ]  Catheterization:  [ ] Stress Test:  	< from: Nuclear Stress Test-Pharmacologic (08.05.19 @ 03:44) >   Negative ECG evidence of ischemia after IV of Lexiscan.* Reviewof raw data shows: Diaphragmatic artifact.* There are medium sized, severe defects in  inferior &  inferoapical walls that are predominantly fixed consistent with diaphragmatic attenuation artifact.* Gated wall motion analysis is performed, and shows  normal wall motion with post stress LVEF of 52%. CHIEF COMPLAINT: sob     HISTORY OF PRESENT ILLNESS:72 yo M PMH of ex smoker COPD (not on home O2),CAD, aortic aneurysm ,chronic GERD, COPD with asthma which he related to chemical inhalation exposure from 9/11 , HLD, hypothyroidism, PTSD, anxiety, melanoma (h/o surgical resection of muscles of L chest wll), PE and afib (on Eliquis right orchitis , h/o nonadherence to medication and follow ups ,poor historian p/w 2 week h/o worsening SOB, orthopnea, PND, and WATERS. Patient states like he has had the same feeling of his throat being closed off for "years" that is now unchanged. Has not taken eliquis x 4 days because was unable to get prescription renewed. Patient had multiple admission and ED visit for shortness of breath in this year. Most recent admission was in [facility] from 8/2/19 to 8/8/15 .He was treated with COPD exacerbation  .He was seen by multi disciplinary teams . Urology for right orchitis ,Pulm for COPD exacerbation ,Endo for elevated TSH. He was also followed  by cardiologist there  and ischemia was r/o by echo/nuclear stress  ( see result below) .IN ED he received Zithromax and ceftriaxone  and neb treatment for COPD exacerbation  Lab ; wbc 17.48 K/uL,auto  neutropil 11.24, HsTrop 59<< 66, ,CKMB 2.71,RI 2.9  Vital signs : 105/59,HR 64,RR15,temp 98F,sat 98%  in RA    ED called Dr. Brumfield  ( pt's cardiologist )who said do not give asa or tx like nstemi no meds for high trop just trend and pt can f/u outpt with her given recent normal stress test in August    Allergies:  alcohol (Vomiting)  No Known Drug Allergies        	    MEDICATIONS:    predniSONE 50 mg oral tablet: 1 tab(s) orally once a day   · 	Eliquis 5 mg oral tablet: 1 tab(s) orally 2 times a day   · 	apixaban 5 mg oral tablet: 1 tab(s) orally every 12 hours  · 	citalopram 20 mg oral tablet: 1 tab(s) orally once a day  · 	levothyroxine 175 mcg (0.175 mg) oral tablet: 1 tab(s) orally once a day  · 	buPROPion 300 mg/24 hours (XL) oral tablet, extended release: 1 tab(s) orally every 24 hours  · 	traZODone 150 mg oral tablet: 1 tab(s) orally once a day (at bedtime)  · 	clonazePAM 0.25 mg oral tablet: 0.25 milligram(s) orally once a day  · 	simvastatin 80 mg oral tablet: 1 tab(s) orally once a day (at bedtime)  · 	fluticasone 50 mcg/inh nasal spray: 1 spray(s) nasal once      PAST MEDICAL & SURGICAL HISTORY:  Chronic GERD  COPD with asthma  Post traumatic stress disorder (PTSD)  HLD (hyperlipidemia)  History of pulmonary embolus (PE)  Hypothyroidism  Anxiety  S/P thyroidectomy      FAMILY HISTORY:  FH: Alzheimers disease  + FH of heart disease      SOCIAL HISTORY:    [ ] live with:alone  [ ] Non-smoker,[x ] former smoker    [x ] no alcohol use [ ] alcohol use:      REVIEW OF SYSTEMS:  General: no fatigue/malaise, weight loss/gain.  Skin: no rashes.  Ophthalmologic: no blurred vision, no loss of vision. 	  ENT: no sore throat, rhinorrhea, sinus congestion.  Cardiovascular :no chest pain ,no palpitation, no dizziness, no diaphoresis, no edema  Respiratory: + SOB, + dry cough or wheeze , +orthopnea, +WATERS  Gastrointestinal:  no N/V/D, no melena/hematemesis/hematochezia.  Genitourinary: no dysuria/hesitancy or hematuria.  Musculoskeletal: no myalgias or arthralgias.  Neurological: no changes in vision or hearing, no lightheadedness/dizziness, no syncope/near syncope	  Psychiatric: +stress/anxiety.       PHYSICAL EXAM:  T(C): 36.7 (09-17-19 @ 19:22), Max: 37 (09-17-19 @ 11:19)  HR: 64 (09-17-19 @ 19:22) (58 - 71)  BP: 105/53 (09-17-19 @ 19:22) (102/49 - 120/64)  RR: 15 (09-17-19 @ 19:22) (15 - 20)  SpO2: 98% (09-17-19 @ 19:22) (98% - 100%)        Appearance: Normal	  HEENT:   Normal oral mucosa, PERRL, EOMI	  Lymphatic: No lymphadenopathy  Cardiovascular: Normal S1 S2, No JVD, No murmurs, trace ankle edema  Respiratory: Lungs clear to auscultation	  Psychiatry: A & O x 3, Mood & affect appropriate  Gastrointestinal:  Soft, Non-tender, + BS	  Skin: No rashes, No ecchymoses, No cyanosis	  Neurologic: Non-focal  Extremities: Normal range of motion, No clubbing, cyanosis   Vascular: Peripheral pulses palpable 2+ bilaterally        LABS:	 	    CBC Full  -  ( 17 Sep 2019 11:55 )  WBC Count : 17.48 K/uL  Hemoglobin : 15.0 g/dL  Hematocrit : 47.0 %  Platelet Count - Automated : 203 K/uL  Mean Cell Volume : 95.3 fL  Mean Cell Hemoglobin : 30.4 pg  Mean Cell Hemoglobin Concentration : 31.9 %  Auto Neutrophil # : 11.24 K/uL  Auto Lymphocyte # : 4.84 K/uL  Auto Monocyte # : 1.28 K/uL  Auto Eosinophil # : 0.04 K/uL  Auto Basophil # : 0.02 K/uL  Auto Neutrophil % : 64.4 %  Auto Lymphocyte % : 27.7 %  Auto Monocyte % : 7.3 %  Auto Eosinophil % : 0.2 %  Auto Basophil % : 0.1 %    09-17    137  |  98  |  26<H>  ----------------------------<  94  3.7   |  26  |  1.21    Ca    8.6      17 Sep 2019 11:55  Phos  2.5     09-17  Mg     2.0     09-17    TPro  7.1  /  Alb  4.2  /  TBili  0.5  /  DBili  x   /  AST  27  /  ALT  25  /  AlkPhos  47  09-17      proBNP: Serum Pro-Brain Natriuretic Peptide: 911.7 pg/mL (09-17 @ 11:55)    Lipid Profile:   HgA1c:   TSH: Thyroid Stimulating Hormone, Serum: 5.89 uIU/mL (09-17 @ 11:55)      CARDIAC MARKERS:      CKMB: 7.77 ng/mL (09-17 @ 13:40)    CKMB Relative Index: 2.9 (09-17 @ 13:40)    High sensitive trop: 59<<66       	    ECG:  	  RADIOLOGY:  OTHER: < from: CT Angio Chest w/ IV Cont (09.17.19 @ 15:21) >  No pulmonary embolism.Atherosclerotic disease of the coronary arteries.< from: CT Angio Chest w/ IV Cont (09.17.19 @ 15:21) >  therosclerotic disease of the aorta and coronary arteries. Enlarged ascending aorta measuring 4.0 cm at the main pulmonary artery, unchanged.            	    PREVIOUS DIAGNOSTIC TESTING:    [ ] Echocardiogram:< from: Transthoracic Echocardiogram (08.03.19 @ 06:48) >  Normal mitral valve. Mild mitral regurgitation.2. Normal trileaflet aortic valve. Mild aortic regurgitation.3. Mild aortic root dilatation, 4. Severely dilated left atrium.  5. Eccentric left ventricular hypertrophy (dilated left ventricle with normal relative wall thickness).6. Normal Left Ventricular Systolic Function,  (EF = 55 to60%)7. Grade II diastolic dysfunction.8. Normal right atrium.9. Normal right ventricular size and systolic function (TAPSE  2.1cm).10. RV systolic pressure is moderately increased at  46 mmHg.11. There is mild tricuspid regurgitation.12. There is mild pulmonic regurgitation.13. Trivial pericardial effusion is seen.        [ ]  Catheterization:  [ ] Stress Test:  	< from: Nuclear Stress Test-Pharmacologic (08.05.19 @ 03:44) >   Negative ECG evidence of ischemia after IV of Lexiscan.* Reviewof raw data shows: Diaphragmatic artifact.* There are medium sized, severe defects in  inferior &  inferoapical walls that are predominantly fixed consistent with diaphragmatic attenuation artifact.* Gated wall motion analysis is performed, and shows  normal wall motion with post stress LVEF of 52%. CHIEF COMPLAINT: sob     HISTORY OF PRESENT ILLNESS:74 yo M PMH of ex smoker COPD (not on home O2),CAD, aortic aneurysm ,chronic GERD, COPD with asthma which he related to chemical inhalation exposure from  , HLD, hypothyroidism, PTSD, anxiety, melanoma (h/o surgical resection of muscles of L chest wall), PE  (on Eliquis)  right orchitis , h/o nonadherence to medication and follow ups ,poor historian p/w 2 week h/o worsening SOB, orthopnea use 3 pillows to sleep. PND, and WATERS. Patient states like he has had the same feeling of his throat being closed off for "years" that is now unchanged. Has not taken eliquis x 4 days because was unable to get prescription renewed. Pateint denies chest pain ,dizziness, palpitation ,N/V .He reports that " I  x2   in my apartment and  EMS has to shock me. He live alone and dose not remember when that happened . Patient had multiple admission and ED visit for shortness of breath in this year. Most recent admission was in [facility] from 19 to 8/8/15 .He was treated with COPD exacerbation  .He was seen by multi disciplinary teams . Urology for right orchitis ,Pulm for COPD exacerbation ,Endo for elevated TSH. He was also followed  by cardiologist there  and ischemia was r/o by echo/nuclear stress  ( see result below) .IN ED he received Zithromax and ceftriaxone  and neb treatment for COPD exacerbation.  Lab ; wbc 17.48 K/uL,auto  neutropil 11.24, HsTrop 59<< 66, ,CKMB 2.71,RI 2.9  Vital signs : 105/59,HR 64,RR15,temp 98F,sat 98%  in RA    ED called Dr. Brumfield  ( pt's cardiologist )who said do not give asa or tx like nstemi no meds for high trop just trend and pt can f/u outpt with her given recent normal stress test in August    Allergies:  alcohol (Vomiting)  No Known Drug Allergies        	    MEDICATIONS:    predniSONE 50 mg oral tablet: 1 tab(s) orally once a day   · 	Eliquis 5 mg oral tablet: 1 tab(s) orally 2 times a day   · 	apixaban 5 mg oral tablet: 1 tab(s) orally every 12 hours  · 	citalopram 20 mg oral tablet: 1 tab(s) orally once a day  · 	levothyroxine 175 mcg (0.175 mg) oral tablet: 1 tab(s) orally once a day  · 	buPROPion 300 mg/24 hours (XL) oral tablet, extended release: 1 tab(s) orally every 24 hours  · 	traZODone 150 mg oral tablet: 1 tab(s) orally once a day (at bedtime)  · 	clonazePAM 0.25 mg oral tablet: 0.25 milligram(s) orally once a day  · 	simvastatin 80 mg oral tablet: 1 tab(s) orally once a day (at bedtime)  · 	fluticasone 50 mcg/inh nasal spray: 1 spray(s) nasal once      PAST MEDICAL & SURGICAL HISTORY:  Chronic GERD  COPD with asthma  Post traumatic stress disorder (PTSD)  HLD (hyperlipidemia)  History of pulmonary embolus (PE)  Hypothyroidism  Anxiety  S/P thyroidectomy      FAMILY HISTORY:  FH: Alzheimers disease  + FH of heart disease      SOCIAL HISTORY:    [ ] live with:alone  [ ] Non-smoker,[x ] former smoker    [x ] no alcohol use [ ] alcohol use:      REVIEW OF SYSTEMS:  General: no fatigue/malaise, weight loss/gain.  Skin: no rashes.  Ophthalmologic: no blurred vision, no loss of vision. 	  ENT: no sore throat, rhinorrhea, sinus congestion.  Cardiovascular :no chest pain ,no palpitation, no dizziness, no diaphoresis, no edema  Respiratory: + SOB, + dry cough or wheeze , +orthopnea, +WATERS  Gastrointestinal:  no N/V/D, no melena/hematemesis/hematochezia.  Genitourinary: no dysuria/hesitancy or hematuria.  Musculoskeletal: no myalgias or arthralgias.  Neurological: no changes in vision or hearing, no lightheadedness/dizziness, no syncope/near syncope	  Psychiatric: +stress/anxiety.       PHYSICAL EXAM:  T(C): 36.7 (19 @ 19:22), Max: 37 (19 @ 11:19)  HR: 64 (19 @ 19:22) (58 - 71)  BP: 105/53 (19 @ 19:22) (102/49 - 120/64)  RR: 15 (19 @ 19:22) (15 - 20)  SpO2: 98% (19 @ 19:22) (98% - 100%)        Appearance: Normal	  HEENT:   Normal oral mucosa, PERRL, EOMI	  Lymphatic: No lymphadenopathy  Cardiovascular: Normal S1 S2, No JVD, No murmurs, trace ankle edema  Respiratory: Lungs clear to auscultation	  Psychiatry: A & O x 3, Mood & affect appropriate  Gastrointestinal:  Soft, Non-tender, + BS	  Skin: No rashes, No ecchymoses, No cyanosis	  Neurologic: Non-focal  Extremities: Normal range of motion, No clubbing, cyanosis   Vascular: Peripheral pulses palpable 2+ bilaterally        LABS:	 	    CBC Full  -  ( 17 Sep 2019 11:55 )  WBC Count : 17.48 K/uL  Hemoglobin : 15.0 g/dL  Hematocrit : 47.0 %  Platelet Count - Automated : 203 K/uL  Mean Cell Volume : 95.3 fL  Mean Cell Hemoglobin : 30.4 pg  Mean Cell Hemoglobin Concentration : 31.9 %  Auto Neutrophil # : 11.24 K/uL  Auto Lymphocyte # : 4.84 K/uL  Auto Monocyte # : 1.28 K/uL  Auto Eosinophil # : 0.04 K/uL  Auto Basophil # : 0.02 K/uL  Auto Neutrophil % : 64.4 %  Auto Lymphocyte % : 27.7 %  Auto Monocyte % : 7.3 %  Auto Eosinophil % : 0.2 %  Auto Basophil % : 0.1 %        137  |  98  |  26<H>  ----------------------------<  94  3.7   |  26  |  1.21    Ca    8.6      17 Sep 2019 11:55  Phos  2.5       Mg     2.0         TPro  7.1  /  Alb  4.2  /  TBili  0.5  /  DBili  x   /  AST  27  /  ALT  25  /  AlkPhos  47        proBNP: Serum Pro-Brain Natriuretic Peptide: 911.7 pg/mL ( @ 11:55)    Lipid Profile:   HgA1c:   TSH: Thyroid Stimulating Hormone, Serum: 5.89 uIU/mL ( @ 11:55)      CARDIAC MARKERS:      CKMB: 7.77 ng/mL ( @ 13:40)    CKMB Relative Index: 2.9 ( @ 13:40)    High sensitive trop: 59<<66       	    ECG:  	  RADIOLOGY:  OTHER: < from: CT Angio Chest w/ IV Cont (19 @ 15:21) >  No pulmonary embolism.Atherosclerotic disease of the coronary arteries.< from: CT Angio Chest w/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

## 2019-09-17 NOTE — H&P ADULT - PROBLEM SELECTOR PLAN 4
Started on Lipitor 40mg QHS for now instead of Zocor    Lipid profile in am, low cholesterol diet recommended [id number] Score of 1 and patient was supposed to take Eliquis which he is non compliant to  Will start on low dose Aspirin 81mg for now due to low [id number] score   Currently rate controlled   House cardiology following  Will monitor on telemetry for now

## 2019-09-17 NOTE — H&P ADULT - NSHPLABSRESULTS_GEN_ALL_CORE
15.0   17.48 )-----------( 203      ( 17 Sep 2019 11:55 )             47.0     09-17    137  |  98  |  26<H>  ----------------------------<  94  3.7   |  26  |  1.21    Ca    8.6      17 Sep 2019 11:55  Phos  2.5     09-17  Mg     2.0     09-17    TPro  7.1  /  Alb  4.2  /  TBili  0.5  /  DBili  x   /  AST  27  /  ALT  25  /  AlkPhos  47  09-17    CTPA: No pulmonary embolism. Atherosclerotic disease of the coronary arteries.    CXR: Persistent slight right hemidiaphragm elevation. Clear lungs. No pleural effusions or pneumothorax. Stable cardiac and mediastinal silhouettes. Trachea midline. Generalized mild osteopenia and mild spinal degenerative change again noted.     EKG: Atrial fibrillation at a rate of 72 with left anterior fascicular block with QTc of 433 15.0   17.48 )-----------( 203      ( 17 Sep 2019 11:55 )             47.0     09-17    137  |  98  |  26<H>  ----------------------------<  94  3.7   |  26  |  1.21    Ca    8.6      17 Sep 2019 11:55  Phos  2.5     09-17  Mg     2.0     09-17    TPro  7.1  /  Alb  4.2  /  TBili  0.5  /  DBili  x   /  AST  27  /  ALT  25  /  AlkPhos  47  09-17    CTPA: No pulmonary embolism. Atherosclerotic disease of the coronary arteries.    CXR: Persistent slight right hemidiaphragm elevation. Clear lungs. No pleural effusions or pneumothorax. Stable cardiac and mediastinal silhouettes. Trachea midline. Generalized mild osteopenia and mild spinal degenerative change again noted.     EKG personally reviewed, Atrial fibrillation at a rate of 72 with left anterior fascicular block with QTc of 433

## 2019-09-17 NOTE — H&P ADULT - NEGATIVE GASTROINTESTINAL SYMPTOMS
no constipation/no hematochezia/no diarrhea/no nausea/no vomiting/no abdominal pain/no melena/no change in bowel habits

## 2019-09-17 NOTE — H&P ADULT - PROBLEM SELECTOR PLAN 7
Continue with Trazadone, Clonazepam, Abilify and Wellbutrin daily   Consider Psychiatry consult in am Continue with Synthroid daily   TSH level in am Continue with Synthroid daily   Elevated TSH level will check T3 and T4
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
## 2019-09-17 NOTE — H&P ADULT - ASSESSMENT
72 y/o M with PMH of COPD(not on home O2), Asthma, PTSD, Anxiety, melanoma (h/o surgical resection of muscles of L chest wall), recently diagnosed PE in July and Afib(On eliquis) who is non compliant with medications presented with 2 weeks of acute on chronic worsening SOB, orthopnea, PND, and WATERS. Admitted to telemetry for elevated troponin     +NSTEMI  +COPD exacerbation-s/p IV Azithromycin and Ceftriaxone, Duoneb and IV Solumedrol 72 y/o M with PMH of COPD(not on home O2), Asthma, PTSD, Anxiety, melanoma (h/o surgical resection of muscles of L chest wall), recently diagnosed PE in July and Afib(On eliquis) who is non compliant with medications presented with 2 weeks of acute on chronic worsening SOB, orthopnea, PND, and WATERS. Admitted to telemetry for elevated troponin     +Elevated troponin   +COPD exacerbation-s/p IV Azithromycin and Ceftriaxone, Duoneb and IV Solumedrol 74 y/o M with PMH of COPD(not on home O2), Asthma, PTSD, Anxiety, melanoma (h/o surgical resection of muscles of L chest wall), recently diagnosed PE in July and Afib(was supposed to be on eliquis) who is non compliant with medications presented with 2 weeks of acute on chronic worsening SOB, orthopnea, PND, and WATERS. Admitted to telemetry for elevated troponin     +Elevated troponin   +COPD exacerbation-s/p IV Azithromycin, Duoneb and IV Solumedrol

## 2019-09-17 NOTE — ED PROVIDER NOTE - OBJECTIVE STATEMENT
72 yo M c PMH of COPD (not on home O2), asthma, PTSD, anxiety, melanoma (h/o surgical resection of muscles of L chest wll), PE and afib (on eliquis) p/w 2 week h/o worsening SOB, orthopnea, PND, and WATERS. states like he has had the same feeling of his throat being closed off for "years" that is now unchanged. has not taken eliquis x 4 days because was unable to get prescription renewed.

## 2019-09-17 NOTE — CONSULT NOTE ADULT - ATTENDING COMMENTS
personally saw and examined patient, labs/vitals reviewed  agree with above assessment and plan  SOB x 2 weeks, currently denies cp, sob, comfortable appearing, laying flat, clinically euvolemic  recent tte from 8/19 revealing mild pulm htn, grossly normal RV systolic function, also with grossly normal lv  systolic function.   pt without cp, recent nuclear stress with fixed defects.  cont asa, statin. cont home eliquis for hx of EP, also with ?new afib.    d/w medicine team

## 2019-09-17 NOTE — H&P ADULT - NEGATIVE NEUROLOGICAL SYMPTOMS
no syncope/no tremors/no focal seizures/no headache/no hemiparesis/no difficulty walking/no loss of consciousness/no generalized seizures/no confusion/no vertigo/no loss of sensation/no transient paralysis/no weakness/no paresthesias

## 2019-09-17 NOTE — H&P ADULT - HISTORY OF PRESENT ILLNESS
74 y/o M with PMH of COPD(not on home O2), Asthma, PTSD, Anxiety, melanoma (h/o surgical resection of muscles of L chest wall), recently diagnosed PE in July and Afib(on Eliquis) who is non compliant with medications presented with 2 weeks of acute on chronic worsening SOB, orthopnea, PND, and WATERS. As per the patient he was seen at [facility] in August for similar complaints and was diagnosed with COPD exacerbation vs bronchitis. Patient was seen by  Pulmonary and was placed on antibiotics with PO Steroids and Nebs. Patient was also seen by Cardiology who did a TTE and NST which was negative for any acute pathology. 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 Patient stated that few weeks ago his symptoms worsened which prompted him to come to this hospital. From [place] provider note, patient was admitted 3 times prior to the admission in August for SOB and WATERS. Patient stated that he is only able to walk less than 10 blocks and would endorse of PND and orthopnea(sleeps chronically on 3 pillows). Patient stated that he has not taken any of his medications for the past 2-3 weeks as "he ran out of the medications". Patient also endorsed of chills and dry cough but denied any fevers. Patient stated that he urinates about every 3 minutes and stated that "it is because of my prostate". Patient denied any CP, fevers, N/V/D/C, abdominal pain, dysuria, melena, hematochezia, recent travel, sick contact, pleuritic or positional chest pain.     On ED admission EKG revealed Atrial fibrillation at a rate of 72 with left anterior fascicular block with QTc of 433, CE x 1: Trop: 59, CE x 2: Trop: 66, WBC: 17.48, BUN/Cr: 26/1.21, TSH: 5.89, BNP: 911.17. CTPA: No pulmonary embolism. Atherosclerotic disease of the coronary arteries. CXR: Persistent slight right hemidiaphragm elevation. Clear lungs. No pleural effusions or pneumothorax. Stable cardiac and mediastinal silhouettes. Trachea midline. Generalized mild osteopenia and mild spinal degenerative change again noted. In the ED patient received IV Azithromycin and Ceftriaxone, IV Solumedrol and Duonesb. When examined patient is resting in the stretcher and denied any current complaints. 72 y/o M with PMH of COPD(not on home O2), Asthma, PTSD, Anxiety, melanoma (h/o surgical resection of muscles of L chest wall), recently diagnosed PE in July and Afib(on Eliquis) who is non compliant with medications presented with 2 weeks of acute on chronic worsening SOB, orthopnea, PND, and WATERS. As per the patient he was seen at [facility] in August for similar complaints and was diagnosed with COPD exacerbation vs bronchitis. Patient was seen by  Pulmonary and was placed on antibiotics with PO Steroids and Nebs. Patient was also seen by Cardiology who did a TTE and NST which was negative for any acute pathology. 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 Patient stated that few weeks ago his symptoms worsened which prompted him to come to this hospital. From [place] provider note, patient was admitted 3 times prior to the admission in August for SOB and WATERS. Patient stated that he is only able to walk less than 10 blocks and would endorse of PND and orthopnea(sleeps chronically on 3 pillows). Patient stated that he has not taken any of his medications for the past 2-3 weeks as "he ran out of the medications". Patient also endorsed of chills and dry cough but denied any fevers. Patient stated that he urinates about every 3 minutes and stated that "it is because of my prostate". Patient denied any CP, fevers, N/V/D/C, abdominal pain, dysuria, melena, hematochezia, recent travel, sick contact, pleuritic or positional chest pain.     On ED admission EKG revealed Atrial fibrillation at a rate of 72 with left anterior fascicular block with QTc of 433, CE x 1: Trop: 59, CE x 2: Trop: 66, WBC: 17.48, BUN/Cr: 26/1.21, TSH: 5.89, BNP: 911.17. CTPA: No pulmonary embolism. Atherosclerotic disease of the coronary arteries. CXR: Persistent slight right hemidiaphragm elevation. Clear lungs. No pleural effusions or pneumothorax. Stable cardiac and mediastinal silhouettes. Trachea midline. Generalized mild osteopenia and mild spinal degenerative change again noted. In the ED patient received IV Azithromycin and Ceftriaxone, IV Solumedrol and Duoneb. When examined patient is resting in the stretcher and denied any current complaints. 72 y/o M with PMH of COPD (not on home O2), Asthma, PTSD, Anxiety, melanoma (h/o surgical resection of muscles of L chest wall), recently diagnosed PE in July and Afib(on Eliquis) who is non compliant with medications presented with 2 weeks of acute on chronic worsening SOB, orthopnea, PND, and WATERS. As per the patient he was seen at [facility] in August for similar complaints and was diagnosed with COPD exacerbation vs bronchitis. Patient was seen by  Pulmonary and was placed on antibiotics with PO Steroids and Nebs. Patient was also seen by Cardiology who did a TTE and NST which was negative for any acute pathology. 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 Patient stated that few weeks ago his symptoms worsened which prompted him to come to this hospital. From [place] provider note, patient was admitted 3 times prior to the admission in August for SOB and WATERS. Patient stated that he is only able to walk less than 10 blocks and would endorse of PND and orthopnea(sleeps chronically on 3 pillows). Patient stated that he has not taken any of his medications for the past 2-3 weeks as "he ran out of the medications". Patient also endorsed of chills and dry cough but denied any fevers. Patient stated that he urinates about every 3 minutes and stated that "it is because of my prostate". Patient denied any CP, fevers, N/V/D/C, abdominal pain, dysuria, melena, hematochezia, recent travel, sick contact, pleuritic or positional chest pain.     On ED admission EKG revealed Atrial fibrillation at a rate of 72 with left anterior fascicular block with QTc of 433, CE x 1: Trop: 59, CE x 2: Trop: 66, WBC: 17.48, BUN/Cr: 26/1.21, TSH: 5.89, BNP: 911.17. CTPA: No pulmonary embolism. Atherosclerotic disease of the coronary arteries. CXR: Persistent slight right hemidiaphragm elevation. Clear lungs. No pleural effusions or pneumothorax. Stable cardiac and mediastinal silhouettes. Trachea midline. Generalized mild osteopenia and mild spinal degenerative change again noted. In the ED patient received IV Azithromycin and Ceftriaxone, IV Solumedrol and Duoneb. When examined patient is resting in the stretcher and denied any current complaints.

## 2019-09-17 NOTE — ED ADULT NURSE NOTE - NSIMPLEMENTINTERV_GEN_ALL_ED
Implemented All Fall Risk Interventions:  [place] to call system. Call bell, personal items and telephone within reach. Instruct patient to call for assistance. Room bathroom lighting operational. Non-slip footwear when patient is off stretcher. Physically safe environment: no spills, clutter or unnecessary equipment. Stretcher in lowest position, wheels locked, appropriate side rails in place. Provide visual cue, wrist band, yellow gown, etc. Monitor gait and stability. Monitor for mental status changes and reorient to person, place, and time. Review medications for side effects contributing to fall risk. Reinforce activity limits and safety measures with patient and family.

## 2019-09-17 NOTE — H&P ADULT - GASTROINTESTINAL DETAILS
no rebound tenderness/no rigidity/soft/bowel sounds normal/nontender/no distention/no guarding/normal

## 2019-09-17 NOTE — H&P ADULT - PROBLEM SELECTOR PLAN 6
Continue with Trazadone, Clonazepam, Abilify and Wellbutrin daily   Consider Psychiatry consult in am Started on Lipitor 40mg QHS instead of home dose of Zocor as patient with elevated troponin and CTA showing Atherosclerotic disease of the coronary arteries.  Lipid profile in am, low cholesterol diet recommended

## 2019-09-17 NOTE — H&P ADULT - NSHPSOCIALHISTORY_GEN_ALL_CORE
Single, lives alone, retired   Quit smoking 40 years ago and used to smoke 5 packs a day from age 13 to 32/quit drinking 10 years ago and used to drink 30 beers and 1/2 a bottle of whiskey a day/denied any illicit drug use
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
## 2019-09-17 NOTE — ED PROVIDER NOTE - CLINICAL SUMMARY MEDICAL DECISION MAKING FREE TEXT BOX
On assessment, /92 VS otherwise wnl, EKG afib @ 74 no kulwinder/std, first trop 56, pt denies CP, spoke with his cardiologist Dr. Brumfield who said do not give asa or tx like nstemi no meds for high trop just trend and pt can f/u outpt with her given recent normal stress test in August rpt trop pending will reassess 74 yo M c PMH of COPD (not on home O2), asthma, PTSD, anxiety, melanoma (h/o surgical resection of muscles of L chest wll), PE and afib (on eliquis) p/w 2 week h/o worsening SOB, orthopnea, PND, and WATERS. On assessment, /92 VS otherwise wnl, EKG afib @ 74 no kulwinder/std, first trop 56, pt denies CP, spoke with his cardiologist Dr. Brumfield who said do not give asa or tx like nstemi no meds for high trop just trend and pt can f/u outpt with her given recent normal stress test in August rpt trop pending will obtain cxr/cta to assess for pe other etiologies could be copd exacerbation vs chf exacerbation vs ptsd will reassess

## 2019-09-17 NOTE — CONSULT NOTE ADULT - PROBLEM SELECTOR RECOMMENDATION 9
+ elevated trop /CK ,less likley ACS  -trend CK /trop until peak ,repeat trop /ck if  chest pain  -EKG as needed for chest pain  continue care as per primary team

## 2019-09-17 NOTE — H&P ADULT - PROBLEM SELECTOR PLAN 8
On heparin sq 5000U q12hrs Continue with Trazadone, Clonazepam, Abilify and Wellbutrin daily   Consider Psychiatry consult in am

## 2019-09-17 NOTE — H&P ADULT - PROBLEM SELECTOR PLAN 2
HsT on admission noted to be 59 with repeat being 66 and then 61 with elevated CK and CKMB. Patient denied any chest pain and EKG with no dynamic ischemic changes  House cardiology consulted and their note stated that "ED called Dr. Brumfield(pt's cardiologist)who said do not give asa or tx like nstemi no meds for high trop just trend and pt can f/u outpt with her given recent normal stress test in August."  Will monitor on telemetry for now.  Will check 1 more set of cardiac enzyme to trend troponin level   HgbA1C, TSH, lipid profile, CBC, CMP in am   Started on Aspirin 81mg daily HsT on admission noted to be 59 with repeat being 66 and then 61 with elevated CK and CKMB. Patient denied any chest pain and EKG with no dynamic ischemic changes  House cardiology consulted and their note stated that "ED called Dr. Brumfield(pt's cardiologist)who said do not give asa or tx like nstemi no meds for high trop just trend and pt can f/u outpt with her given recent normal stress test in August."  Will monitor on telemetry for now.  Will check 1 more set of cardiac enzyme to trend troponin level   HgbA1C, TSH, lipid profile, CBC, CMP in am   Started on Aspirin 81mg daily for now

## 2019-09-17 NOTE — H&P ADULT - NEGATIVE MUSCULOSKELETAL SYMPTOMS
no arthralgia/no myalgia/no joint swelling/no neck pain/no arthritis/no muscle cramps/no stiffness/no muscle weakness

## 2019-09-17 NOTE — ED ADULT NURSE NOTE - OBJECTIVE STATEMENT
Pt A+OX3 c/o SOB.  States he's had SOB for 18 years.  Unable to explain what changed that brought him to the ER.  Extremely anxious and unable to consistently answer questions.  Pt having periods of anxiety where he takes all monitoring equipment off and needs to walk.  Emotional support given.  Labs obtained and sent as ordered.  #20g SL R AC placed.  CM placed.  EKG done.  MD at bedside

## 2019-09-17 NOTE — H&P ADULT - PROBLEM SELECTOR PLAN 5
Continue with Synthroid daily   TSH level in am Patient with supposed diagnosis of PE found at [facility] in July 2019 and was started on Eliquis. CTPA from 08/19 and again today 9/19 revealed no PE.   Will need to obtain recent results from [facility] in am as he does not have PE with multiple CTPA performed

## 2019-09-17 NOTE — H&P ADULT - NEGATIVE OPHTHALMOLOGIC SYMPTOMS
no blurred vision L/no blurred vision R/no discharge R/no discharge L/no photophobia/no lacrimation L/no lacrimation R/no diplopia

## 2019-09-17 NOTE — H&P ADULT - RS GEN PE MLT RESP DETAILS PC
clear to auscultation bilaterally/no intercostal retractions/no rales/respirations non-labored/no chest wall tenderness/airway patent/good air movement/no wheezes/no rhonchi/breath sounds equal/normal

## 2019-09-17 NOTE — CONSULT NOTE ADULT - ASSESSMENT
anxiety, melanoma (h/o surgical resection of muscles of L chest wall), PE  (on Eliquis)  right orchitis , h/o nonadherence to medication and follow ups ,poor historian p/w 2 week h/o worsening shortness of breath, anxiety, melanoma (h/o surgical resection of muscles of L chest wall), PE  (on Eliquis)  right orchitis , h/o nonadherence to medication and follow ups ,poor historian p/w 2 week h/o worsening shortness of breath, orthopnea use 3 pillows to sleep, PND, and WATERS which he is being experiencing for many years found to have elevated trop likley in the setting of COPD exacerbation, low suspicious of ACS Patient recent r/o of ischemic by echo/stress test

## 2019-09-17 NOTE — H&P ADULT - PROBLEM SELECTOR PLAN 3
Patient currently not wheezing on examination and s/p IV Ceftriaxone, Azithromycin, IV solumedrol and Duonebs   Will start on Spiriva and continue with Proventil   Will need to call Pulmonary consult in am Patient currently not wheezing on examination and s/p IV Ceftriaxone, Azithromycin, IV Solumedrol and Duoneb   Will start on Spiriva and continue with Proventil   Will need to call Pulmonary consult in am  Will start Prednisone 40mg QD for 5 days

## 2019-09-17 NOTE — H&P ADULT - NEGATIVE ENMT SYMPTOMS
no ear pain/no tinnitus/no vertigo/no sinus symptoms/no nasal congestion/no hearing difficulty/no nasal discharge

## 2019-09-17 NOTE — H&P ADULT - NEGATIVE PSYCHIATRIC SYMPTOMS
no suicidal ideation/no depression/no memory loss/no mood swings/no agitation/no auditory hallucinations/no paranoia/no visual hallucinations

## 2019-09-18 LAB
ANION GAP SERPL CALC-SCNC: 13 MMO/L — SIGNIFICANT CHANGE UP (ref 7–14)
BUN SERPL-MCNC: 23 MG/DL — SIGNIFICANT CHANGE UP (ref 7–23)
CALCIUM SERPL-MCNC: 8.1 MG/DL — LOW (ref 8.4–10.5)
CHLORIDE SERPL-SCNC: 102 MMOL/L — SIGNIFICANT CHANGE UP (ref 98–107)
CHOLEST SERPL-MCNC: 147 MG/DL — SIGNIFICANT CHANGE UP (ref 120–199)
CK MB BLD-MCNC: 5.32 NG/ML — SIGNIFICANT CHANGE UP (ref 1–6.6)
CK SERPL-CCNC: 137 U/L — SIGNIFICANT CHANGE UP (ref 30–200)
CO2 SERPL-SCNC: 22 MMOL/L — SIGNIFICANT CHANGE UP (ref 22–31)
CREAT SERPL-MCNC: 1.03 MG/DL — SIGNIFICANT CHANGE UP (ref 0.5–1.3)
GLUCOSE SERPL-MCNC: 127 MG/DL — HIGH (ref 70–99)
HBA1C BLD-MCNC: 5.9 % — HIGH (ref 4–5.6)
HCT VFR BLD CALC: 42.3 % — SIGNIFICANT CHANGE UP (ref 39–50)
HDLC SERPL-MCNC: 36 MG/DL — SIGNIFICANT CHANGE UP (ref 35–55)
HGB BLD-MCNC: 13.9 G/DL — SIGNIFICANT CHANGE UP (ref 13–17)
LIPID PNL WITH DIRECT LDL SERPL: 102 MG/DL — SIGNIFICANT CHANGE UP
MAGNESIUM SERPL-MCNC: 2.2 MG/DL — SIGNIFICANT CHANGE UP (ref 1.6–2.6)
MCHC RBC-ENTMCNC: 31.2 PG — SIGNIFICANT CHANGE UP (ref 27–34)
MCHC RBC-ENTMCNC: 32.9 % — SIGNIFICANT CHANGE UP (ref 32–36)
MCV RBC AUTO: 94.8 FL — SIGNIFICANT CHANGE UP (ref 80–100)
NRBC # FLD: 0.03 K/UL — SIGNIFICANT CHANGE UP (ref 0–0)
PHOSPHATE SERPL-MCNC: 4.4 MG/DL — SIGNIFICANT CHANGE UP (ref 2.5–4.5)
PLATELET # BLD AUTO: 184 K/UL — SIGNIFICANT CHANGE UP (ref 150–400)
PMV BLD: 10.1 FL — SIGNIFICANT CHANGE UP (ref 7–13)
POTASSIUM SERPL-MCNC: 4.5 MMOL/L — SIGNIFICANT CHANGE UP (ref 3.5–5.3)
POTASSIUM SERPL-SCNC: 4.5 MMOL/L — SIGNIFICANT CHANGE UP (ref 3.5–5.3)
RBC # BLD: 4.46 M/UL — SIGNIFICANT CHANGE UP (ref 4.2–5.8)
RBC # FLD: 14.8 % — HIGH (ref 10.3–14.5)
SODIUM SERPL-SCNC: 137 MMOL/L — SIGNIFICANT CHANGE UP (ref 135–145)
SPECIMEN SOURCE: SIGNIFICANT CHANGE UP
SPECIMEN SOURCE: SIGNIFICANT CHANGE UP
T3 SERPL-MCNC: 57.9 NG/DL — LOW (ref 80–200)
T4 AB SER-ACNC: 5.58 UG/DL — SIGNIFICANT CHANGE UP (ref 5.1–13)
TRIGL SERPL-MCNC: 111 MG/DL — SIGNIFICANT CHANGE UP (ref 10–149)
TROPONIN T, HIGH SENSITIVITY: 31 NG/L — SIGNIFICANT CHANGE UP (ref ?–14)
WBC # BLD: 10.78 K/UL — HIGH (ref 3.8–10.5)
WBC # FLD AUTO: 10.78 K/UL — HIGH (ref 3.8–10.5)

## 2019-09-18 PROCEDURE — 93010 ELECTROCARDIOGRAM REPORT: CPT

## 2019-09-18 PROCEDURE — 99233 SBSQ HOSP IP/OBS HIGH 50: CPT

## 2019-09-18 PROCEDURE — 99223 1ST HOSP IP/OBS HIGH 75: CPT

## 2019-09-18 RX ORDER — CLONAZEPAM 1 MG
0.25 TABLET ORAL
Refills: 0 | Status: DISCONTINUED | OUTPATIENT
Start: 2019-09-18 | End: 2019-09-19

## 2019-09-18 RX ORDER — APIXABAN 2.5 MG/1
5 TABLET, FILM COATED ORAL EVERY 12 HOURS
Refills: 0 | Status: DISCONTINUED | OUTPATIENT
Start: 2019-09-18 | End: 2019-09-19

## 2019-09-18 RX ORDER — LACTOBACILLUS ACIDOPHILUS 100MM CELL
1 CAPSULE ORAL
Refills: 0 | Status: DISCONTINUED | OUTPATIENT
Start: 2019-09-18 | End: 2019-09-19

## 2019-09-18 RX ADMIN — HEPARIN SODIUM 5000 UNIT(S): 5000 INJECTION INTRAVENOUS; SUBCUTANEOUS at 05:56

## 2019-09-18 RX ADMIN — Medication 0.25 MILLIGRAM(S): at 12:43

## 2019-09-18 RX ADMIN — Medication 1 TABLET(S): at 09:36

## 2019-09-18 RX ADMIN — Medication 150 MICROGRAM(S): at 05:56

## 2019-09-18 RX ADMIN — TIOTROPIUM BROMIDE 1 CAPSULE(S): 18 CAPSULE ORAL; RESPIRATORY (INHALATION) at 12:44

## 2019-09-18 RX ADMIN — ARIPIPRAZOLE 2 MILLIGRAM(S): 15 TABLET ORAL at 13:39

## 2019-09-18 RX ADMIN — Medication 1 SPRAY(S): at 05:56

## 2019-09-18 RX ADMIN — Medication 150 MILLIGRAM(S): at 21:01

## 2019-09-18 RX ADMIN — Medication 1 SPRAY(S): at 17:28

## 2019-09-18 RX ADMIN — Medication 1 TABLET(S): at 17:27

## 2019-09-18 RX ADMIN — Medication 1 MILLIGRAM(S): at 17:27

## 2019-09-18 RX ADMIN — Medication 1 MILLIGRAM(S): at 12:44

## 2019-09-18 RX ADMIN — AZITHROMYCIN 250 MILLIGRAM(S): 500 TABLET, FILM COATED ORAL at 17:29

## 2019-09-18 RX ADMIN — Medication 1 TABLET(S): at 12:43

## 2019-09-18 RX ADMIN — ATORVASTATIN CALCIUM 40 MILLIGRAM(S): 80 TABLET, FILM COATED ORAL at 21:01

## 2019-09-18 RX ADMIN — Medication 0.5 MILLIGRAM(S): at 09:36

## 2019-09-18 RX ADMIN — Medication 0.25 MILLIGRAM(S): at 17:27

## 2019-09-18 RX ADMIN — Medication 81 MILLIGRAM(S): at 12:43

## 2019-09-18 RX ADMIN — Medication 1 MILLIGRAM(S): at 20:51

## 2019-09-18 RX ADMIN — BUPROPION HYDROCHLORIDE 300 MILLIGRAM(S): 150 TABLET, EXTENDED RELEASE ORAL at 13:39

## 2019-09-18 RX ADMIN — APIXABAN 5 MILLIGRAM(S): 2.5 TABLET, FILM COATED ORAL at 17:38

## 2019-09-18 RX ADMIN — Medication 40 MILLIGRAM(S): at 05:56

## 2019-09-18 NOTE — BEHAVIORAL HEALTH ASSESSMENT NOTE - HPI (INCLUDE ILLNESS QUALITY, SEVERITY, DURATION, TIMING, CONTEXT, MODIFYING FACTORS, ASSOCIATED SIGNS AND SYMPTOMS)
Patient is a 72 y/o M with PMH of COPD(not on home O2), Asthma, PTSD, Anxiety, melanoma (h/o surgical resection of muscles of L chest wall), recently diagnosed PE in July and Afib(was supposed to be on eliquis) who is non compliant with medications presented with 2 weeks of acute on chronic worsening SOB, orthopnea, PND, and WATERS. Admitted to telemetry for elevated troponin. Patient reports [place] [facility] [place] to move to [place] with his girlfriend which whom he is planning to purpose to. 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 pt started psych treatment in 2007.  Patient has been on medication including citalopram, Effexor - current regime is Wellbutrin 100, abilify 2, clonopin 0.25, trazodone 150.   pt has h/o ETOH abuse with one inpt psych in context of ETOH abuse and sxs of psychosis for 3 weeks, 10 years ago. pt has been sober for 10 years.  no SA. no active SI or HI.   Patient was seen and assessed at bedside.  He is alert and oriented, calm and cooperative.  Patient reports not being able to sleep for 4 days as he is having increasing memories and flashbacks of 9/11 since the anniversary of the loss of his friends and colleagues. Patient did receive PRN ativan and feels tired at this time, looking to get some rest.  Patient reports doing well on his outpt medications and wants to changes at this time. He is motivated for the future, enjoys aspects of life, has future goals. He denies psychosis or luma.

## 2019-09-18 NOTE — PROGRESS NOTE ADULT - PROBLEM SELECTOR PLAN 6
Started on Lipitor 40mg QHS instead of home dose of Zocor as patient with elevated troponin and CTA showing Atherosclerotic disease of the coronary arteries.  Lipid profile in am, low cholesterol diet recommended c/w  Lipitor 40mg QHS instead of home dose of Zocor as patient with elevated troponin and CTA showing Atherosclerotic disease of the coronary arteries.  Lipid profile in am, low cholesterol diet recommended c/w  Lipitor 40mg QHS instead of home dose of Zocor as patient with elevated troponin and CTA showing Atherosclerotic disease of the coronary arteries.  -  low cholesterol diet recommended

## 2019-09-18 NOTE — BEHAVIORAL HEALTH ASSESSMENT NOTE - SUMMARY
PLAN  - as medical  hospitalization can increase level of anxiety, can increase standing klonopin dose to 0.25mg BID  - for ongoing anxiety or insomnia can give Ativan 1mg q8hrs PRN  - continue home medications Abilify 2mg daily, Trazodone 150mg qhs, wellbutrin 300mg daily  - referrals for outpt f/u to include  resources on [place] or [facility] [phone] (M-F 9am-7pm) [facility] -[phone] PLAN  - as medical  hospitalization can increase level of anxiety, can increase standing klonopin dose to 0.25mg BID  - for ongoing anxiety or insomnia can give Ativan 1mg q8hrs PRN  - please hold benzos if patient sedated or has rr < 12  - continue home medications Abilify 2mg daily, Trazodone 150mg qhs, wellbutrin 300mg daily  - referrals for outpt f/u to include  resources on [place] or [facility] [phone] (M-F 9am-7pm) [facility] [place] -[phone] Patient is a 74 y/o M with PMH of COPD(not on home O2), Asthma, PTSD, Anxiety, melanoma (h/o surgical resection of muscles of L chest wall), recently diagnosed PE in July and Afib(was supposed to be on eliquis) who is non compliant with medications presented with 2 weeks of acute on chronic worsening SOB, orthopnea, PND, and WATERS.  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 pt started psych treatment in 2007. current regime is Wellbutrin 100, abilify 2, clonopin 0.25, trazodone 150. pt has h/o ETOH abuse with one inpt psych in context of ETOH abuse and sxs of psychosis for 3 weeks, 10 years ago. pt has been sober for 10 years.   Patient reports not being able to sleep for 4 days as he is having increasing memories and flashbacks of 9/11 since the anniversary of the loss of his friends and colleagues. Patient reports doing well on his outpt medications and wants to changes at this time.    PLAN  - as medical  hospitalization can increase level of anxiety, can increase standing klonopin dose to 0.25mg BID  - for ongoing anxiety or insomnia can give Ativan 1mg q8hrs PRN  - please hold benzos if patient sedated or has rr < 12  - continue home medications Abilify 2mg daily, Trazodone 150mg qhs, wellbutrin 300mg daily  - referrals for outpt f/u to include  resources on [place] or [facility] [phone] (M-F 9am-7pm) [facility] [place] -[phone]

## 2019-09-18 NOTE — PROGRESS NOTE ADULT - PROBLEM SELECTOR PLAN 1
Differential include underline COPD exacerbation vs ACS vs Anxiety driven vs possible CHF(patient with mild LE edema but no rales on physical examination and pro-BNP mildly elevated    Patient s/p IV Ceftriaxone and IV Azithromycin, IV Solumedrol and Duoneb   RVP negative   Blood cultures ordered   Will continue with IV Azithromycin for now and will start on PO prednisone 40mg QD for 5 days   Will need to call Pulmonary consult in am   Will continue with Proventil and started on Spiriva QD Differential include underline COPD exacerbation vs ACS vs Anxiety driven vs possible CHF(patient with mild LE edema but no rales on physical examination and pro-BNP mildly elevated    Patient s/p IV Ceftriaxone and IV Azithromycin, IV Solumedrol and Duoneb   RVP negative   F?U Blood cultures   - s.p  IV Azithromycin for now  and PO prednisone 40mg QD   - Patient to follow with outpatient  Pulmonogist   Will continue with Proventil and started on Spiriva QD Differential include underline COPD exacerbation vs ACS vs Anxiety driven vs possible CHF(patient with mild LE edema but no rales on physical examination and pro-BNP mildly elevated    Patient s/p IV Ceftriaxone and IV Azithromycin, IV Solumedrol and Duoneb   RVP negative   F?U Blood cultures   - s.p  IV Azithromycin  and PO prednisone 40mg    - Patient to follow with outpatient  Pulmonogist   Will continue with Proventil and started on Spiriva QD

## 2019-09-18 NOTE — BEHAVIORAL HEALTH ASSESSMENT NOTE - NSBHCHARTREVIEWLAB_PSY_A_CORE FT
13.9   10.78 )-----------( 184      ( 18 Sep 2019 07:00 )             42.3   09-18    137  |  102  |  23  ----------------------------<  127<H>  4.5   |  22  |  1.03    Ca    8.1<L>      18 Sep 2019 07:00  Phos  4.4     09-18  Mg     2.2     09-18    TPro  7.1  /  Alb  4.2  /  TBili  0.5  /  DBili  x   /  AST  27  /  ALT  25  /  AlkPhos  47  09-17

## 2019-09-18 NOTE — PROGRESS NOTE ADULT - SUBJECTIVE AND OBJECTIVE BOX
Patient is a 73y old  Male who presents with a chief complaint of SOB, WATERS, orthopnea and PND (17 Sep 2019 20:32)      SUBJECTIVE / OVERNIGHT EVENTS:    Review of Systems:     MEDICATIONS  (STANDING):  ARIPiprazole 2 milliGRAM(s) Oral daily  aspirin enteric coated 81 milliGRAM(s) Oral daily  atorvastatin 40 milliGRAM(s) Oral at bedtime  azithromycin  IVPB 500 milliGRAM(s) IV Intermittent every 24 hours  buPROPion XL . 300 milliGRAM(s) Oral daily  clonazePAM  Tablet 0.25 milliGRAM(s) Oral daily  fluticasone propionate 50 MICROgram(s)/spray Nasal Spray 1 Spray(s) Both Nostrils two times a day  heparin  Injectable 5000 Unit(s) SubCutaneous every 12 hours  influenza   Vaccine 0.5 milliLiter(s) IntraMuscular once  lactobacillus acidophilus 1 Tablet(s) Oral three times a day with meals  levothyroxine 150 MICROGram(s) Oral daily  LORazepam   Injectable 1 milliGRAM(s) IV Push once  tiotropium 18 MICROgram(s) Capsule 1 Capsule(s) Inhalation daily  traZODone 150 milliGRAM(s) Oral at bedtime    MEDICATIONS  (PRN):  ALBUTerol    90 MICROgram(s) HFA Inhaler 2 Puff(s) Inhalation every 6 hours PRN Shortness of Breath and/or Wheezing      PHYSICAL EXAM:  T(C): 36.8 (09-18-19 @ 12:40), Max: 36.8 (09-18-19 @ 12:40)  HR: 62 (09-18-19 @ 12:40) (58 - 72)  BP: 132/76 (09-18-19 @ 12:40) (101/67 - 132/76)  RR: 18 (09-18-19 @ 12:40) (15 - 19)  SpO2: 100% (09-18-19 @ 12:40) (98% - 100%)  I&O's Summary    GENERAL: NAD, well-developed  HEAD:  Atraumatic, Normocephalic  EYES: EOMI, PERRLA, conjunctiva and sclera clear  NECK: Supple, No elevated JVD  CHEST/LUNG: Clear to auscultation bilaterally; No wheeze  HEART: Regular rate and rhythm; No murmurs, rubs, or gallops  ABDOMEN: Soft, Nontender, Nondistended; Bowel sounds present  EXTREMITIES:  2+ Peripheral Pulses, No clubbing, cyanosis, or edema  PSYCH: AAOx3  NEUROLOGY: CN II-XII grossly intact, moving all extremities  SKIN: No rashes or lesions    LABS:  CAPILLARY BLOOD GLUCOSE                              13.9   10.78 )-----------( 184      ( 18 Sep 2019 07:00 )             42.3     09-18    137  |  102  |  23  ----------------------------<  127<H>  4.5   |  22  |  1.03    Ca    8.1<L>      18 Sep 2019 07:00  Phos  4.4     09-18  Mg     2.2     09-18    TPro  7.1  /  Alb  4.2  /  TBili  0.5  /  DBili  x   /  AST  27  /  ALT  25  /  AlkPhos  47  09-17    PT/INR - ( 17 Sep 2019 11:55 )   PT: 10.8 SEC;   INR: 0.97          PTT - ( 17 Sep 2019 11:55 )  PTT:25.6 SEC  CARDIAC MARKERS ( 18 Sep 2019 07:00 )  x     / x     / 137 u/L / 5.32 ng/mL / x      CARDIAC MARKERS ( 17 Sep 2019 20:35 )  x     / x     / 206 u/L / 6.74 ng/mL / x      CARDIAC MARKERS ( 17 Sep 2019 13:40 )  x     / x     / 271 u/L / 7.77 ng/mL / x              RADIOLOGY & ADDITIONAL TESTS:    Telemetry Personally Reviewed -     Imaging Personally Reviewed -     Imaging Reviewed -     Consultant(s) Notes Reviewed -       Care Discussed with Consultants/Other Providers - Patient is a 73y old  Male who presents with a chief complaint of SOB, WATERS, orthopnea and PND (17 Sep 2019 20:32)      SUBJECTIVE / OVERNIGHT EVENTS: This am, patient reports SOB i/s/o anxiety. Reports running out of medications including eliquis. He denies any wheezing, states baseline non-productive cough. Denies any lightheadness or dizziness or feeling like he is going to pass out.     Review of Systems:     MEDICATIONS  (STANDING):  ARIPiprazole 2 milliGRAM(s) Oral daily  aspirin enteric coated 81 milliGRAM(s) Oral daily  atorvastatin 40 milliGRAM(s) Oral at bedtime  azithromycin  IVPB 500 milliGRAM(s) IV Intermittent every 24 hours  buPROPion XL . 300 milliGRAM(s) Oral daily  clonazePAM  Tablet 0.25 milliGRAM(s) Oral daily  fluticasone propionate 50 MICROgram(s)/spray Nasal Spray 1 Spray(s) Both Nostrils two times a day  heparin  Injectable 5000 Unit(s) SubCutaneous every 12 hours  influenza   Vaccine 0.5 milliLiter(s) IntraMuscular once  lactobacillus acidophilus 1 Tablet(s) Oral three times a day with meals  levothyroxine 150 MICROGram(s) Oral daily  LORazepam   Injectable 1 milliGRAM(s) IV Push once  tiotropium 18 MICROgram(s) Capsule 1 Capsule(s) Inhalation daily  traZODone 150 milliGRAM(s) Oral at bedtime    MEDICATIONS  (PRN):  ALBUTerol    90 MICROgram(s) HFA Inhaler 2 Puff(s) Inhalation every 6 hours PRN Shortness of Breath and/or Wheezing      PHYSICAL EXAM:  T(C): 36.8 (09-18-19 @ 12:40), Max: 36.8 (09-18-19 @ 12:40)  HR: 62 (09-18-19 @ 12:40) (58 - 72)  BP: 132/76 (09-18-19 @ 12:40) (101/67 - 132/76)  RR: 18 (09-18-19 @ 12:40) (15 - 19)  SpO2: 100% (09-18-19 @ 12:40) (98% - 100%)  I&O's Summary    GENERAL:Elderly anxious man laying in bed non-toxic appearing in  NAD, well-developed  HEAD:  Atraumatic, Normocephalic  EYES: EOMI, PERRLA, conjunctiva and sclera clear  NECK: Supple, No elevated JVD  CHEST/LUNG: Clear to auscultation bilaterally; No wheeze  HEART: Regular rate and rhythm; No murmurs, rubs, or gallops  ABDOMEN: Soft, Nontender, Nondistended; Bowel sounds present  EXTREMITIES:  2+ Peripheral Pulses, No clubbing, cyanosis, or edema  PSYCH: AAOx3; anxious   NEUROLOGY: CN II-XII grossly intact, moving all extremities  SKIN: No rashes or lesions    LABS:  CAPILLARY BLOOD GLUCOSE                              13.9   10.78 )-----------( 184      ( 18 Sep 2019 07:00 )             42.3     09-18    137  |  102  |  23  ----------------------------<  127<H>  4.5   |  22  |  1.03    Ca    8.1<L>      18 Sep 2019 07:00  Phos  4.4     09-18  Mg     2.2     09-18    TPro  7.1  /  Alb  4.2  /  TBili  0.5  /  DBili  x   /  AST  27  /  ALT  25  /  AlkPhos  47  09-17    PT/INR - ( 17 Sep 2019 11:55 )   PT: 10.8 SEC;   INR: 0.97          PTT - ( 17 Sep 2019 11:55 )  PTT:25.6 SEC  CARDIAC MARKERS ( 18 Sep 2019 07:00 )  x     / x     / 137 u/L / 5.32 ng/mL / x      CARDIAC MARKERS ( 17 Sep 2019 20:35 )  x     / x     / 206 u/L / 6.74 ng/mL / x      CARDIAC MARKERS ( 17 Sep 2019 13:40 )  x     / x     / 271 u/L / 7.77 ng/mL / x              RADIOLOGY & ADDITIONAL TESTS:    Telemetry Personally Reviewed - sinus bradycardia     Imaging Personally Reviewed -     Imaging Reviewed -     Consultant(s) Notes Reviewed -       Care Discussed with Consultants/Other Providers - Patient is a 73y old  Male who presents with a chief complaint of SOB, WATERS, orthopnea and PND (17 Sep 2019 20:32)      SUBJECTIVE / OVERNIGHT EVENTS: This am, patient reports SOB i/s/o anxiety. Reports running out of medications including eliquis. He denies any wheezing, states baseline non-productive cough. Denies any lightheadness or dizziness or feeling like he is going to pass out.     Review of Systems:     MEDICATIONS  (STANDING):  ARIPiprazole 2 milliGRAM(s) Oral daily  aspirin enteric coated 81 milliGRAM(s) Oral daily  atorvastatin 40 milliGRAM(s) Oral at bedtime  azithromycin  IVPB 500 milliGRAM(s) IV Intermittent every 24 hours  buPROPion XL . 300 milliGRAM(s) Oral daily  clonazePAM  Tablet 0.25 milliGRAM(s) Oral daily  fluticasone propionate 50 MICROgram(s)/spray Nasal Spray 1 Spray(s) Both Nostrils two times a day  heparin  Injectable 5000 Unit(s) SubCutaneous every 12 hours  influenza   Vaccine 0.5 milliLiter(s) IntraMuscular once  lactobacillus acidophilus 1 Tablet(s) Oral three times a day with meals  levothyroxine 150 MICROGram(s) Oral daily  LORazepam   Injectable 1 milliGRAM(s) IV Push once  tiotropium 18 MICROgram(s) Capsule 1 Capsule(s) Inhalation daily  traZODone 150 milliGRAM(s) Oral at bedtime    MEDICATIONS  (PRN):  ALBUTerol    90 MICROgram(s) HFA Inhaler 2 Puff(s) Inhalation every 6 hours PRN Shortness of Breath and/or Wheezing      PHYSICAL EXAM:  T(C): 36.8 (09-18-19 @ 12:40), Max: 36.8 (09-18-19 @ 12:40)  HR: 62 (09-18-19 @ 12:40) (58 - 72)  BP: 132/76 (09-18-19 @ 12:40) (101/67 - 132/76)  RR: 18 (09-18-19 @ 12:40) (15 - 19)  SpO2: 100% (09-18-19 @ 12:40) (98% - 100%)  I&O's Summary    GENERAL:Elderly anxious man laying in bed non-toxic appearing in  NAD, well-developed  HEAD:  Atraumatic, Normocephalic  EYES: EOMI, PERRLA, conjunctiva and sclera clear  NECK: Supple, No elevated JVD  CHEST/LUNG: Clear to auscultation bilaterally; No wheeze  HEART: Regular rate and rhythm; No murmurs, rubs, or gallops  ABDOMEN: Soft, Nontender, Nondistended; Bowel sounds present  EXTREMITIES:  2+ Peripheral Pulses, No clubbing, cyanosis, or edema  PSYCH: AAOx3; anxious   NEUROLOGY: CN II-XII grossly intact, moving all extremities  SKIN: No rashes or lesions    LABS:  CAPILLARY BLOOD GLUCOSE                              13.9   10.78 )-----------( 184      ( 18 Sep 2019 07:00 )             42.3     09-18    137  |  102  |  23  ----------------------------<  127<H>  4.5   |  22  |  1.03    Ca    8.1<L>      18 Sep 2019 07:00  Phos  4.4     09-18  Mg     2.2     09-18    TPro  7.1  /  Alb  4.2  /  TBili  0.5  /  DBili  x   /  AST  27  /  ALT  25  /  AlkPhos  47  09-17    PT/INR - ( 17 Sep 2019 11:55 )   PT: 10.8 SEC;   INR: 0.97          PTT - ( 17 Sep 2019 11:55 )  PTT:25.6 SEC  CARDIAC MARKERS ( 18 Sep 2019 07:00 )  x     / x     / 137 u/L / 5.32 ng/mL / x      CARDIAC MARKERS ( 17 Sep 2019 20:35 )  x     / x     / 206 u/L / 6.74 ng/mL / x      CARDIAC MARKERS ( 17 Sep 2019 13:40 )  x     / x     / 271 u/L / 7.77 ng/mL / x              RADIOLOGY & ADDITIONAL TESTS:    Telemetry Personally Reviewed - sinus bradycardia sinus bradycardia to sinus rhythm with 1st degree AV block on telemetry.     One episode of paroxysmal atrial fibrillation with RVR in the PM     Imaging Personally Reviewed -     Imaging Reviewed -     Consultant(s) Notes Reviewed -       Care Discussed with Consultants/Other Providers -

## 2019-09-18 NOTE — BEHAVIORAL HEALTH ASSESSMENT NOTE - NSBHCONSULTFOLLOWAFTERCARE_PSY_A_CORE FT
[facility] [phone]  [facility] [phone]  [place] [phone]    [facility] treatment referral [facility] - [phone]

## 2019-09-18 NOTE — PROGRESS NOTE ADULT - PROBLEM SELECTOR PLAN 4
[id number] Score of 1 and patient was supposed to take Eliquis which he is non compliant to  Will start on low dose Aspirin 81mg for now due to low [id number] score   Currently rate controlled   House cardiology following  Will monitor on telemetry for now [id number] Score of 2   - Will resume  Eliquis   Currently in sinus bradycardia   House cardiology following  Will monitor on telemetry for now

## 2019-09-18 NOTE — CONSULT NOTE ADULT - SUBJECTIVE AND OBJECTIVE BOX
CHIEF COMPLAINT: shortness of breath, dyspnea on exertion, BL lower extremity edema    HISTORY OF PRESENT ILLNESS: 73 yr. old male with pmhx of COPD (not on home O2), asthma, [employer] exposure follows with Dr. Fiorella Beltran [facility], PTSD, anxiety, melanoma s/p resection of muscles of left chest wall, GERD, HLD, hypothyroidism s/p thyroidectomy, Pulmonary embolus diagnosed 07/09/2019 at [facility] was placed on Eliquis but reports it was discontinued by his doctor now on aspirin. [id number]= 2 ( 1 for HTN and, 1 for age 65-74). Subsequent CTPA 8/2019 and 9/2019 reveal no pulmonary embolus. Patient presented to the ED with 2 weeks of worsening shortness of breath, orthopnea, PND, and dyspnea on exertion with BL LE edema. Patient denies chest pain, palpitations, dizziness, lightheadedness, has + activity intolerance, denies syncope or near syncope.  Telemetry since admission has displayed sinus bradycardia to sinus rhythm 58 to 72 bpm with no ectopy.      PAST MEDICAL & SURGICAL HISTORY:  Chronic GERD  COPD with asthma  Post traumatic stress disorder (PTSD)  HLD (hyperlipidemia)  HTN  History of pulmonary embolus (PE)  Hypothyroidism  Anxiety  S/P thyroidectomy                     PREVIOUS DIAGNOSTIC TESTING:    [X ] Echocardiogram: LVEF 55-60%, Mild MR/AR. Grade II diastolic dysfunction, Mild TR/AK. Trace pericardial effusion.      	    MEDICATIONS:  aspirin enteric coated 81 milliGRAM(s) Oral daily  heparin  Injectable 5000 Unit(s) SubCutaneous every 12 hours    azithromycin  IVPB 500 milliGRAM(s) IV Intermittent every 24 hours    ALBUTerol    90 MICROgram(s) HFA Inhaler 2 Puff(s) Inhalation every 6 hours PRN  tiotropium 18 MICROgram(s) Capsule 1 Capsule(s) Inhalation daily    ARIPiprazole 2 milliGRAM(s) Oral daily  buPROPion XL . 300 milliGRAM(s) Oral daily  clonazePAM  Tablet 0.25 milliGRAM(s) Oral daily  traZODone 150 milliGRAM(s) Oral at bedtime      atorvastatin 40 milliGRAM(s) Oral at bedtime  levothyroxine 150 MICROGram(s) Oral daily    fluticasone propionate 50 MICROgram(s)/spray Nasal Spray 1 Spray(s) Both Nostrils two times a day  influenza   Vaccine 0.5 milliLiter(s) IntraMuscular once      FAMILY HISTORY:  FH: Alzheimers disease      SOCIAL HISTORY:    [X ] Non-smoker: Quit smoking 1951  [ ] Smoker  [X ] Alcohol: denies      Intolerance to alcohol: (Vomiting)  No Known Drug Allergies        	    REVIEW OF SYSTEMS:  CONSTITUTIONAL: No fever, weight loss, + fatigue  EYES: No eye pain, visual disturbances, or discharge  ENMT:  No difficulty hearing, tinnitus, vertigo; No sinus or throat pain  NECK: No pain or stiffness  RESPIRATORY: No cough, wheezing, chills or hemoptysis; + Shortness of Breath  CARDIOVASCULAR: No chest pain, palpitations, passing out, dizziness, + BL LE leg swelling  GASTROINTESTINAL: No abdominal or epigastric pain. No nausea, vomiting, or hematemesis; No diarrhea or constipation. No melena or hematochezia.  GENITOURINARY: No dysuria, frequency, hematuria, or incontinence  NEUROLOGICAL: No headaches, memory loss, loss of strength, numbness, or tremors  SKIN: No itching, burning, rashes, or lesions   LYMPH Nodes: No enlarged glands  ENDOCRINE: No heat or cold intolerance; No hair loss  MUSCULOSKELETAL: No joint pain or swelling; No muscle, back, or extremity pain  PSYCHIATRIC: No depression,  + anxiety, mood swings, or difficulty sleeping  HEME/LYMPH: No easy bruising, or bleeding gums  ALLERY AND IMMUNOLOGIC: No hives or eczema	    [X ] All others negative	  [ ] Unable to obtain    PHYSICAL EXAM:  T(C): 36.8 (09-18-19 @ 12:40), Max: 37 (09-18-19 @ 10:00)  HR: 62 (09-18-19 @ 12:40) (58 - 72)  BP: 132/76 (09-18-19 @ 12:40) (101/67 - 132/76)  RR: 18 (09-18-19 @ 12:40) (15 - 19)  SpO2: 100% (09-18-19 @ 12:40) (97% - 100%)  Wt(kg): --  I&O's Summary      Appearance: Normal, sitting on bed in NAD	  HEENT:  normocephalic, atraumatic  Lymphatic: No lymphadenopathy  Cardiovascular: Normal S1 S2, No JVD, No murmurs, No edema  Respiratory: Lungs clear to auscultation BL, equal bilateral 	  Psychiatry: A & O x 3, Mood & affect appropriate  Gastrointestinal:  Soft, Non-tender, + BS	  Skin: No rashes, No ecchymoses, BL LE venous stasis pattern  Neurologic: Non-focal  Extremities: Normal range of motion, No clubbing, cyanosis or edema  Vascular: Peripheral pulses palpable 2+ bilaterally    TELEMETRY: Sinus bradycardia to normal sinus rhythm	    ECG: Sinus rhythm @ 72 bpm, APC's, JPC's 	  RADIOLOGY:  OTHER: 	  	  LABS:	 	    CARDIAC MARKERS:      CKMB: 5.32 ng/mL (09-18 @ 07:00)  CKMB: 6.74 ng/mL (09-17 @ 20:35)                              13.9   10.78 )-----------( 184      ( 18 Sep 2019 07:00 )             42.3     09-18    137  |  102  |  23  ----------------------------<  127<H>  4.5   |  22  |  1.03    Ca    8.1<L>      18 Sep 2019 07:00  Phos  4.4     09-18  Mg     2.2     09-18    TPro  7.1  /  Alb  4.2  /  TBili  0.5  /  DBili  x   /  AST  27  /  ALT  25  /  AlkPhos  47  09-17    proBNP:   Lipid Profile:   HgA1c: Hemoglobin A1C, Whole Blood: 5.9 % (09-18 @ 07:00)    T3 57.9 CHIEF COMPLAINT: shortness of breath, dyspnea on exertion, BL lower extremity edema    HISTORY OF PRESENT ILLNESS: 73 yr. old male with pmhx of COPD (not on home O2), asthma, [employer] exposure follows with Dr. Fiorella Beltran [facility], PTSD, anxiety, melanoma s/p resection of muscles of left chest wall, GERD, HLD, hypothyroidism s/p thyroidectomy, Pulmonary embolus diagnosed 07/09/2019 at [facility] was placed on Eliquis but reports it was discontinued by his doctor now on aspirin. [id number]= 2 ( 1 for HTN and, 1 for age 65-74). Subsequent CTPA 8/2019 and 9/2019 reveal no pulmonary embolus. Patient presented to the ED with 2 weeks of worsening shortness of breath, orthopnea, PND, and dyspnea on exertion with BL LE edema. Patient denies chest pain, palpitations, dizziness, lightheadedness, has + activity intolerance, denies syncope or near syncope.  Telemetry since admission has displayed sinus bradycardia to sinus rhythm 58 to 72 bpm and one episode of atrial fibrillation with rapid ventricular which was self terminating .      PAST MEDICAL & SURGICAL HISTORY:  Chronic GERD  COPD with asthma  Post traumatic stress disorder (PTSD)  HLD (hyperlipidemia)  HTN  History of pulmonary embolus (PE)  Hypothyroidism  Anxiety  S/P thyroidectomy                     PREVIOUS DIAGNOSTIC TESTING:    [X ] Echocardiogram: LVEF 55-60%, Mild MR/AR. Grade II diastolic dysfunction, Mild TR/VT. Trace pericardial effusion.      	    MEDICATIONS:  aspirin enteric coated 81 milliGRAM(s) Oral daily  heparin  Injectable 5000 Unit(s) SubCutaneous every 12 hours    azithromycin  IVPB 500 milliGRAM(s) IV Intermittent every 24 hours    ALBUTerol    90 MICROgram(s) HFA Inhaler 2 Puff(s) Inhalation every 6 hours PRN  tiotropium 18 MICROgram(s) Capsule 1 Capsule(s) Inhalation daily    ARIPiprazole 2 milliGRAM(s) Oral daily  buPROPion XL . 300 milliGRAM(s) Oral daily  clonazePAM  Tablet 0.25 milliGRAM(s) Oral daily  traZODone 150 milliGRAM(s) Oral at bedtime      atorvastatin 40 milliGRAM(s) Oral at bedtime  levothyroxine 150 MICROGram(s) Oral daily    fluticasone propionate 50 MICROgram(s)/spray Nasal Spray 1 Spray(s) Both Nostrils two times a day  influenza   Vaccine 0.5 milliLiter(s) IntraMuscular once      FAMILY HISTORY:  FH: Alzheimers disease      SOCIAL HISTORY:    [X ] Non-smoker: Quit smoking 1951  [ ] Smoker  [X ] Alcohol: denies      Intolerance to alcohol: (Vomiting)  No Known Drug Allergies        	    REVIEW OF SYSTEMS:  CONSTITUTIONAL: No fever, weight loss, + fatigue  EYES: No eye pain, visual disturbances, or discharge  ENMT:  No difficulty hearing, tinnitus, vertigo; No sinus or throat pain  NECK: No pain or stiffness  RESPIRATORY: No cough, wheezing, chills or hemoptysis; + Shortness of Breath  CARDIOVASCULAR: No chest pain, palpitations, passing out, dizziness, + BL LE leg swelling  GASTROINTESTINAL: No abdominal or epigastric pain. No nausea, vomiting, or hematemesis; No diarrhea or constipation. No melena or hematochezia.  GENITOURINARY: No dysuria, frequency, hematuria, or incontinence  NEUROLOGICAL: No headaches, memory loss, loss of strength, numbness, or tremors  SKIN: No itching, burning, rashes, or lesions   LYMPH Nodes: No enlarged glands  ENDOCRINE: No heat or cold intolerance; No hair loss  MUSCULOSKELETAL: No joint pain or swelling; No muscle, back, or extremity pain  PSYCHIATRIC: No depression,  + anxiety, mood swings, or difficulty sleeping  HEME/LYMPH: No easy bruising, or bleeding gums  ALLERY AND IMMUNOLOGIC: No hives or eczema	    [X ] All others negative	  [ ] Unable to obtain    PHYSICAL EXAM:  T(C): 36.8 (09-18-19 @ 12:40), Max: 37 (09-18-19 @ 10:00)  HR: 62 (09-18-19 @ 12:40) (58 - 72)  BP: 132/76 (09-18-19 @ 12:40) (101/67 - 132/76)  RR: 18 (09-18-19 @ 12:40) (15 - 19)  SpO2: 100% (09-18-19 @ 12:40) (97% - 100%)  Wt(kg): --  I&O's Summary      Appearance: Normal, sitting on bed in NAD	  HEENT:  normocephalic, atraumatic  Lymphatic: No lymphadenopathy  Cardiovascular: Normal S1 S2, No JVD, No murmurs, No edema  Respiratory: Lungs clear to auscultation BL, equal bilateral 	  Psychiatry: A & O x 3, Mood & affect appropriate  Gastrointestinal:  Soft, Non-tender, + BS	  Skin: No rashes, No ecchymoses, BL LE venous stasis pattern  Neurologic: Non-focal  Extremities: Normal range of motion, No clubbing, cyanosis or edema  Vascular: Peripheral pulses palpable 2+ bilaterally    TELEMETRY: Sinus bradycardia to normal sinus rhythm	    ECG: Sinus rhythm @ 72 bpm, APC's, JPC's 	  RADIOLOGY:  OTHER: 	  	  LABS:	 	    CARDIAC MARKERS:      CKMB: 5.32 ng/mL (09-18 @ 07:00)  CKMB: 6.74 ng/mL (09-17 @ 20:35)                              13.9   10.78 )-----------( 184      ( 18 Sep 2019 07:00 )             42.3     09-18    137  |  102  |  23  ----------------------------<  127<H>  4.5   |  22  |  1.03    Ca    8.1<L>      18 Sep 2019 07:00  Phos  4.4     09-18  Mg     2.2     09-18    TPro  7.1  /  Alb  4.2  /  TBili  0.5  /  DBili  x   /  AST  27  /  ALT  25  /  AlkPhos  47  09-17    proBNP:   Lipid Profile:   HgA1c: Hemoglobin A1C, Whole Blood: 5.9 % (09-18 @ 07:00)    T3 57.9

## 2019-09-18 NOTE — CHART NOTE - NSCHARTNOTEFT_GEN_A_CORE
Called by nurse because patient was did not want home oxygen decreased because said he needs it besides having oxygenation of 97% on room air. Patient was seen at bedside. It was explained to patient that he did not need such high level of oxygen. Patient was examined, Cardio: S1,S2 heard no murmurs, rubs or gallops, Lungs: Clear to auscultation bilaterally. Abdomen : soft, protuberant, normoactive bowel sounds heard, no tenderness on palpation.    Patient requested to have to speak to Doctor as he stated that he wanted to speak to someone with more experience and threatened to leave. Patient was calmed and Dr was called to speak to patient. Dr. Holden Hospitalist came to see patient and was able to further calm patient. Ativan 1 mg was ordered. EKG was done as nurse reported heart rate was in 130s. Patient received medication and became calm.  EKG showed: sinus bradycardia at 57 bpm with QT/QTc 452/439.  Will continue to monitor closely.

## 2019-09-18 NOTE — PROGRESS NOTE ADULT - PROBLEM SELECTOR PLAN 5
Patient with supposed diagnosis of PE found at [facility] in July 2019 and was started on Eliquis. CTPA from 08/19 and again today 9/19 revealed no PE.   Will need to obtain recent results from [facility] in am as he does not have PE with multiple CTPA performed Patient with supposed diagnosis of PE found at [facility] in July 2019 and was started on Eliquis. CTPA from 08/19 and again today 9/19 revealed no PE.   - Continue Eliquis;

## 2019-09-18 NOTE — BEHAVIORAL HEALTH ASSESSMENT NOTE - CASE SUMMARY
74 y/o M with PPH of PTSD, anxiety d/o, PMH of COPD (not on home O2), Asthma, melanoma (h/o surgical resection of muscles of L chest wall), recent PE in July and Afib(on Eliquis), dyspnea and orthopnea secondary to n/c; psych c/s for anxiety. Patient endorses anxiety but is easily distracted, appears to exhibit more anxious features when alone which may also be effecting his reporting of dyspnea. If possible would offer patient ample distractions such as TV, incentive spirometer for breathing exercises, etc. No si/i/p or hi/i/p noted. C/w standing psych meds as above, OK to increase klonopin to 0.25mg BID standing, Ativan 1mg q8h prn anxiety as well PO preferable to IV. Hold benzos for RR<12, sedation, respiratory depression. Will watch for delirium given age and psychiatric polypharmacy.

## 2019-09-18 NOTE — PROGRESS NOTE ADULT - PROBLEM SELECTOR PLAN 7
Continue with Synthroid daily   Elevated TSH level will check T3 and T4 Continue with Synthroid daily   Elevated TSH level i/s/o medication noncompliance  - Counseled patient on importance of taking medication

## 2019-09-18 NOTE — BEHAVIORAL HEALTH ASSESSMENT NOTE - RISK ASSESSMENT
no acute risk to self or others. Low Acute Suicide Risk no acute risk to self or others at this time - patient is future oriented, no hx of SA or SI, has support from provider and friends, no active substance use  patient does have chronic risk factors of male gender, hx of truama, hx substance use

## 2019-09-18 NOTE — BEHAVIORAL HEALTH ASSESSMENT NOTE - NSBHCHARTREVIEWVS_PSY_A_CORE FT
Vital Signs Last 24 Hrs  T(C): 36.7 (18 Sep 2019 17:18), Max: 37 (18 Sep 2019 10:00)  T(F): 98 (18 Sep 2019 17:18), Max: 98.6 (18 Sep 2019 10:00)  HR: 68 (18 Sep 2019 17:18) (61 - 72)  BP: 139/73 (18 Sep 2019 17:18) (101/67 - 139/73)  BP(mean): --  RR: 20 (18 Sep 2019 17:18) (15 - 20)  SpO2: 97% (18 Sep 2019 17:18) (97% - 100%)

## 2019-09-18 NOTE — PROGRESS NOTE ADULT - PROBLEM SELECTOR PLAN 8
Continue with Trazadone, Clonazepam, Abilify and Wellbutrin daily   Consider Psychiatry consult in am Continue with Trazadone, Clonazepam, Abilify and Wellbutrin daily   - Will consult  Psychiatry consult

## 2019-09-18 NOTE — PROGRESS NOTE ADULT - PROBLEM SELECTOR PLAN 2
HsT on admission noted to be 59 with repeat being 66 and then 61 with elevated CK and CKMB. Patient denied any chest pain and EKG with no dynamic ischemic changes  House cardiology consulted and their note stated that "ED called Dr. Brumfield(pt's cardiologist)who said do not give asa or tx like nstemi no meds for high trop just trend and pt can f/u outpt with her given recent normal stress test in August."  Will monitor on telemetry for now.  Will check 1 more set of cardiac enzyme to trend troponin level   HgbA1C, TSH, lipid profile, CBC, CMP in am   Started on Aspirin 81mg daily for now Type 2 NTSEMI; HsT on admission noted to be 59 with repeat being 66 and then 61 with elevated CK and CKMB. Patient denied any chest pain and EKG with no dynamic ischemic changes  House cardiology consulted and their note stated that "ED called Dr. Brumfield(pt's cardiologist)who said do not give asa or tx like nstemi no meds for high trop just trend and pt can f/u outpt with her given recent normal stress test in August."  Will monitor on telemetry for now.  Will check 1 more set of cardiac enzyme to trend troponin level   HgbA1C, TSH, lipid profile, CBC, CMP in am   c/w Aspirin 81mg daily for now

## 2019-09-18 NOTE — PROGRESS NOTE ADULT - PROBLEM SELECTOR PLAN 9
Continue with Trazadone, Clonazepam, Abilify and Wellbutrin daily   Consider Psychiatry consult in am Continue with Trazadone, Clonazepam, Abilify and Wellbutrin daily   - Will reach out to Psychiatry given concern for underlying PTSD and anxiety disorder driving presentation.

## 2019-09-18 NOTE — PROGRESS NOTE ADULT - PROBLEM SELECTOR PLAN 10
On heparin sq 5000U q12hrs On heparin sq 5000U q12hrs  Dispo: most likely home pending psych recommendation

## 2019-09-18 NOTE — CONSULT NOTE ADULT - ASSESSMENT
ASSESSMENT/PLAN:    73 yr. old male with pmhx of COPD (not on home O2), asthma, [employer] exposure [facility], PTSD, anxiety, melanoma s/p resection of muscles of left chest wall, GERD, HLD, hypothyroidism s/p thyroidectomy, Pulmonary embolus diagnosed 07/09/2019 at [facility] was placed on Eliquis but reports it was discontinued by his doctor now on aspirin. [id number]= 2 ( 1 for HTN and, 1 for age 65-74). Subsequent CTPA 8/2019 and 9/2019 reveal no pulmonary embolus. Patient presented to the ED with 2 weeks of worsening shortness of breath, orthopnea, PND, and dyspnea on exertion with BL LE edema. Patient denies chest pain, palpitations, dizziness, lightheadedness, has + activity intolerance, denies syncope or near syncope.  Telemetry since admission has displayed sinus bradycardia to sinus rhythm 58 to 72 bpm with no ectopy.    1. COPD/asthma: not on home O2 (O2 sat %)     Follows with [facility] program     Continue Albuterol 90 mcg via HFA Q 6 hrs prn     Continue tiotropium 18 mcg inhaled x 1 daily  2. Atrial fibrillation: paroxysmal sinus rhythm since admission     [id number]= 2 (Eliquis discontinued per patient)     Aspirin 81 mg po daily     TTE:  8/2019 EF 55-60% no thrombus  3. Hypothyroidisms/p thyroidectomy: T3 57.9     Levothyroxine 150 mcg po daily  4. Anxiety/PTSD;     Psych consult ordered by primary team     Continue Abilify, trazodone, bupropion  5. DVT prophylaxis:     On heparin continue as prescribed  6. Dyslipidemia:      Continue atorvastatin    Discussed and reviewed with Dr. Emmy Jefferson, DNP-C ASSESSMENT/PLAN:    73 yr. old male with pmhx of COPD (not on home O2), asthma, [employer] exposure follows with Dr. Fiorella Beltran [facility], PTSD, anxiety, melanoma s/p resection of muscles of left chest wall, GERD, HLD, hypothyroidism s/p thyroidectomy, Pulmonary embolus diagnosed 07/09/2019 at [facility] was placed on Eliquis but reports it was discontinued by his doctor now on aspirin. [id number]= 2 ( 1 for HTN and, 1 for age 65-74). Subsequent CTPA 8/2019 and 9/2019 reveal no pulmonary embolus. Patient presented to the ED with 2 weeks of worsening shortness of breath, orthopnea, PND, and dyspnea on exertion with BL LE edema. Patient denies chest pain, palpitations, dizziness, lightheadedness, has + activity intolerance, denies syncope or near syncope.  Telemetry since admission has displayed sinus bradycardia to sinus rhythm 58 to 72 bpm with one nonsustained episode of atrial fibrillation with rapid ventricular response.    1. COPD/asthma: not on home O2 (O2 sat %)     Follows with [facility] program     Continue Albuterol 90 mcg via HFA Q 6 hrs prn     Continue tiotropium 18 mcg inhaled x 1 daily  2. Atrial fibrillation: predominant sinus bradycardia to sinus rhythm with 1st degree AV block on telemetry.     One episode of paroxysmal atrial fibrillation with RVR this afternoon.     [id number]= 2 (Eliquis discontinued per patient)     Aspirin 81 mg po daily     TTE:  8/2019 EF 55-60% no thrombus     Restart Eliquis 5 mg po 2 x daily  3. Hypothyroidisms/p thyroidectomy: T3 =57.9     Levothyroxine 150 mcg po daily  4. Anxiety/PTSD;     Psych consult ordered by primary team     Continue Abilify, trazodone, bupropion  5. DVT prophylaxis:     On heparin continue as prescribed  6. Dyslipidemia:      Continue atorvastatin    Discussed and reviewed with Dr. Emmy Jefferson, DNP-C

## 2019-09-18 NOTE — PROGRESS NOTE ADULT - PROBLEM SELECTOR PLAN 3
Patient currently not wheezing on examination and s/p IV Ceftriaxone, Azithromycin, IV Solumedrol and Duoneb   Will start on Spiriva and continue with Proventil   Will need to call Pulmonary consult in am  Will start Prednisone 40mg QD for 5 days Patient currently not wheezing on examination and s/p IV Ceftriaxone, Azithromycin, IV Solumedrol and Duoneb   c/w  Spiriva and continue with Proventil   - outpatient pulmonary follow up on discharge Patient currently not wheezing on examination and s/p IV Ceftriaxone, Azithromycin, s/p IV Solumedrol and Duoneb   c/w  Spiriva and continue with Proventil   - outpatient pulmonary follow up on discharge

## 2019-09-18 NOTE — BEHAVIORAL HEALTH ASSESSMENT NOTE - ACTIVATING EVENTS/STRESSORS
Other/Triggering events leading to humiliation, shame, and/or despair (e.g. Loss of relationship, financial or health status) (real or anticipated)

## 2019-09-18 NOTE — PROGRESS NOTE ADULT - ASSESSMENT
72 y/o M with PMH of COPD(not on home O2), Asthma, PTSD, Anxiety, melanoma (h/o surgical resection of muscles of L chest wall), recently diagnosed PE in July and Afib(was supposed to be on eliquis) who is non compliant with medications presented with 2 weeks of acute on chronic worsening SOB, orthopnea, PND, and WATERS, Pt. sysymptoms likely secondary to. Admitted to telemetry for elevated troponin     +Elevated troponin   +COPD exacerbation-s/p IV Azithromycin, Duoneb and IV Solumedrol 74 y/o M with PMH of COPD(not on home O2), Asthma, PTSD, Anxiety, melanoma (h/o surgical resection of muscles of L chest wall), recently diagnosed PE in July and Afib(was supposed to be on eliquis) who is non compliant with medications presented with 2 weeks of acute on chronic worsening SOB, orthopnea, PND, and WATERS, Pt. symptoms likely secondary to anxiety and mild COPD exacerbation in setting of running out of medications.  Admitted to telemetry for elevated troponin. Patient awaiting psychiatry recommendations given concern for underlying PTSD and anxiety disorder driving presenting symptoms.     +Elevated troponin Type 2 nstemi i/s/o anxiety attack +/ mild COPD exacerbation   +COPD exacerbation-s/p IV Azithromycin, Duoneb and IV Solumedrol;   - Patient without wheezing on exam, will discontinue steroids  - Continue albuterol and spiriva  - Monitor ambulatory pulse oxygen

## 2019-09-19 ENCOUNTER — TRANSCRIPTION ENCOUNTER (OUTPATIENT)
Age: 74
End: 2019-09-19

## 2019-09-19 VITALS
HEART RATE: 62 BPM | SYSTOLIC BLOOD PRESSURE: 125 MMHG | OXYGEN SATURATION: 98 % | DIASTOLIC BLOOD PRESSURE: 78 MMHG | RESPIRATION RATE: 18 BRPM

## 2019-09-19 VITALS
TEMPERATURE: 98 F | RESPIRATION RATE: 18 BRPM | HEART RATE: 78 BPM | SYSTOLIC BLOOD PRESSURE: 122 MMHG | DIASTOLIC BLOOD PRESSURE: 67 MMHG | OXYGEN SATURATION: 100 %

## 2019-09-19 LAB
ANION GAP SERPL CALC-SCNC: 11 MMO/L — SIGNIFICANT CHANGE UP (ref 7–14)
BUN SERPL-MCNC: 26 MG/DL — HIGH (ref 7–23)
CALCIUM SERPL-MCNC: 7.9 MG/DL — LOW (ref 8.4–10.5)
CHLORIDE SERPL-SCNC: 104 MMOL/L — SIGNIFICANT CHANGE UP (ref 98–107)
CO2 SERPL-SCNC: 25 MMOL/L — SIGNIFICANT CHANGE UP (ref 22–31)
CREAT SERPL-MCNC: 1.1 MG/DL — SIGNIFICANT CHANGE UP (ref 0.5–1.3)
GLUCOSE SERPL-MCNC: 90 MG/DL — SIGNIFICANT CHANGE UP (ref 70–99)
HCT VFR BLD CALC: 42.3 % — SIGNIFICANT CHANGE UP (ref 39–50)
HGB BLD-MCNC: 13.4 G/DL — SIGNIFICANT CHANGE UP (ref 13–17)
MAGNESIUM SERPL-MCNC: 2.2 MG/DL — SIGNIFICANT CHANGE UP (ref 1.6–2.6)
MCHC RBC-ENTMCNC: 30.5 PG — SIGNIFICANT CHANGE UP (ref 27–34)
MCHC RBC-ENTMCNC: 31.7 % — LOW (ref 32–36)
MCV RBC AUTO: 96.1 FL — SIGNIFICANT CHANGE UP (ref 80–100)
NRBC # FLD: 0 K/UL — SIGNIFICANT CHANGE UP (ref 0–0)
PHOSPHATE SERPL-MCNC: 3 MG/DL — SIGNIFICANT CHANGE UP (ref 2.5–4.5)
PLATELET # BLD AUTO: 155 K/UL — SIGNIFICANT CHANGE UP (ref 150–400)
PMV BLD: 10 FL — SIGNIFICANT CHANGE UP (ref 7–13)
POTASSIUM SERPL-MCNC: 4.4 MMOL/L — SIGNIFICANT CHANGE UP (ref 3.5–5.3)
POTASSIUM SERPL-SCNC: 4.4 MMOL/L — SIGNIFICANT CHANGE UP (ref 3.5–5.3)
RBC # BLD: 4.4 M/UL — SIGNIFICANT CHANGE UP (ref 4.2–5.8)
RBC # FLD: 14.9 % — HIGH (ref 10.3–14.5)
SODIUM SERPL-SCNC: 140 MMOL/L — SIGNIFICANT CHANGE UP (ref 135–145)
T3 SERPL-MCNC: 62.6 NG/DL — LOW (ref 80–200)
T4 FREE SERPL-MCNC: 1.32 NG/DL — SIGNIFICANT CHANGE UP (ref 0.9–1.8)
WBC # BLD: 9.87 K/UL — SIGNIFICANT CHANGE UP (ref 3.8–10.5)
WBC # FLD AUTO: 9.87 K/UL — SIGNIFICANT CHANGE UP (ref 3.8–10.5)

## 2019-09-19 PROCEDURE — 99239 HOSP IP/OBS DSCHRG MGMT >30: CPT

## 2019-09-19 PROCEDURE — 99497 ADVNCD CARE PLAN 30 MIN: CPT

## 2019-09-19 PROCEDURE — 93010 ELECTROCARDIOGRAM REPORT: CPT

## 2019-09-19 PROCEDURE — 99232 SBSQ HOSP IP/OBS MODERATE 35: CPT

## 2019-09-19 RX ORDER — AZITHROMYCIN 500 MG/1
1 TABLET, FILM COATED ORAL
Qty: 4 | Refills: 0
Start: 2019-09-19 | End: 2019-09-22

## 2019-09-19 RX ORDER — TIOTROPIUM BROMIDE 18 UG/1
1 CAPSULE ORAL; RESPIRATORY (INHALATION)
Qty: 1 | Refills: 0
Start: 2019-09-19 | End: 2019-10-18

## 2019-09-19 RX ORDER — ASPIRIN/CALCIUM CARB/MAGNESIUM 324 MG
1 TABLET ORAL
Qty: 0 | Refills: 0 | DISCHARGE
Start: 2019-09-19

## 2019-09-19 RX ORDER — SIMVASTATIN 20 MG/1
1 TABLET, FILM COATED ORAL
Qty: 0 | Refills: 0 | DISCHARGE

## 2019-09-19 RX ORDER — ATORVASTATIN CALCIUM 80 MG/1
1 TABLET, FILM COATED ORAL
Qty: 0 | Refills: 0 | DISCHARGE
Start: 2019-09-19

## 2019-09-19 RX ORDER — FLUTICASONE PROPIONATE 50 MCG
1 SPRAY, SUSPENSION NASAL
Qty: 0 | Refills: 0 | DISCHARGE
Start: 2019-09-19

## 2019-09-19 RX ADMIN — Medication 1 SPRAY(S): at 04:44

## 2019-09-19 RX ADMIN — Medication 1 TABLET(S): at 09:15

## 2019-09-19 RX ADMIN — ARIPIPRAZOLE 2 MILLIGRAM(S): 15 TABLET ORAL at 09:15

## 2019-09-19 RX ADMIN — Medication 81 MILLIGRAM(S): at 09:15

## 2019-09-19 RX ADMIN — Medication 150 MICROGRAM(S): at 04:45

## 2019-09-19 RX ADMIN — APIXABAN 5 MILLIGRAM(S): 2.5 TABLET, FILM COATED ORAL at 04:45

## 2019-09-19 RX ADMIN — BUPROPION HYDROCHLORIDE 300 MILLIGRAM(S): 150 TABLET, EXTENDED RELEASE ORAL at 09:15

## 2019-09-19 RX ADMIN — Medication 0.25 MILLIGRAM(S): at 04:49

## 2019-09-19 RX ADMIN — TIOTROPIUM BROMIDE 1 CAPSULE(S): 18 CAPSULE ORAL; RESPIRATORY (INHALATION) at 09:15

## 2019-09-19 NOTE — DISCHARGE NOTE PROVIDER - NSFOLLOWUPCLINICS_GEN_ALL_ED_FT
[facility] Pulmonolgy and Sleep Medicine  Pulmonology  [address] [place] [address], [facility] [address]  [place] [address]  Phone: [phone]  Fax:   Follow Up Time: 4-6 Days

## 2019-09-19 NOTE — DISCHARGE NOTE PROVIDER - PROVIDER TOKENS
FREE:[LAST:[Dr Brumfield],PHONE:[(   )    -],FAX:[(   )    -],FOLLOWUP:[1 week]],FREE:[LAST:[OUtpatient Pulmonologist],PHONE:[(   )    -],FAX:[(   )    -],FOLLOWUP:[1 week]] FREE:[LAST:[Dr Brumfield],PHONE:[(   )    -],FAX:[(   )    -],FOLLOWUP:[1 week]] FREE:[LAST:[Dr Brumfield],PHONE:[(   )    -],FAX:[(   )    -],FOLLOWUP:[1 week]],FREE:[LAST:[almeida],PHONE:[(   )    -],FAX:[(   )    -],ADDRESS:[[phone] [facility]],FOLLOWUP:[1 week]],PROVIDER:[TOKEN:[[id number]],FOLLOWUP:[1 week]]

## 2019-09-19 NOTE — PROGRESS NOTE ADULT - SUBJECTIVE AND OBJECTIVE BOX
Patient is a 73y old  Male who presents with a chief complaint of SOB, WATERS, orthopnea and PND (19 Sep 2019 08:51)      SUBJECTIVE / OVERNIGHT EVENTS: This am, patient examined at bedside. Denies any SOB or chest pain. No wheezing. Discussed discharge planning and necessary follow up,     Review of Systems:     MEDICATIONS  (STANDING):  apixaban 5 milliGRAM(s) Oral every 12 hours  ARIPiprazole 2 milliGRAM(s) Oral daily  aspirin enteric coated 81 milliGRAM(s) Oral daily  atorvastatin 40 milliGRAM(s) Oral at bedtime  azithromycin  IVPB 500 milliGRAM(s) IV Intermittent every 24 hours  buPROPion XL . 300 milliGRAM(s) Oral daily  clonazePAM  Tablet 0.25 milliGRAM(s) Oral two times a day  fluticasone propionate 50 MICROgram(s)/spray Nasal Spray 1 Spray(s) Both Nostrils two times a day  influenza   Vaccine 0.5 milliLiter(s) IntraMuscular once  lactobacillus acidophilus 1 Tablet(s) Oral three times a day with meals  levothyroxine 150 MICROGram(s) Oral daily  tiotropium 18 MICROgram(s) Capsule 1 Capsule(s) Inhalation daily  traZODone 150 milliGRAM(s) Oral at bedtime    MEDICATIONS  (PRN):  ALBUTerol    90 MICROgram(s) HFA Inhaler 2 Puff(s) Inhalation every 6 hours PRN Shortness of Breath and/or Wheezing  LORazepam     Tablet 1 milliGRAM(s) Oral every 8 hours PRN Anxiety      PHYSICAL EXAM:  T(C): 36.7 (09-19-19 @ 08:00), Max: 36.8 (09-18-19 @ 12:40)  HR: 78 (09-19-19 @ 08:00) (62 - 78)  BP: 122/67 (09-19-19 @ 08:00) (120/70 - 139/73)  RR: 18 (09-19-19 @ 08:00) (18 - 20)  SpO2: 100% (09-19-19 @ 08:00) (97% - 100%)  I&O's Summary    18 Sep 2019 07:01  -  19 Sep 2019 07:00  --------------------------------------------------------  IN: 220 mL / OUT: 2000 mL / NET: -1780 mL      GENERAL: Middle age man in  NAD, well-developed  HEAD:  Atraumatic, Normocephalic  EYES: EOMI, PERRLA, conjunctiva and sclera clear  NECK: Supple, No elevated JVD  CHEST/LUNG: Clear to auscultation bilaterally; No wheeze  HEART: Regular rate and rhythm; No murmurs, rubs, or gallops  ABDOMEN: Soft, Nontender, Nondistended; Bowel sounds present  EXTREMITIES:  2+ Peripheral Pulses, No clubbing, cyanosis, or edema  PSYCH: AAOx3  NEUROLOGY: CN II-XII grossly intact, moving all extremities  SKIN: No rashes or lesions    LABS:  CAPILLARY BLOOD GLUCOSE                              13.4   9.87  )-----------( 155      ( 19 Sep 2019 06:15 )             42.3     09-19    140  |  104  |  26<H>  ----------------------------<  90  4.4   |  25  |  1.10    Ca    7.9<L>      19 Sep 2019 06:15  Phos  3.0     09-19  Mg     2.2     09-19        CARDIAC MARKERS ( 18 Sep 2019 07:00 )  x     / x     / 137 u/L / 5.32 ng/mL / x      CARDIAC MARKERS ( 17 Sep 2019 20:35 )  x     / x     / 206 u/L / 6.74 ng/mL / x      CARDIAC MARKERS ( 17 Sep 2019 13:40 )  x     / x     / 271 u/L / 7.77 ng/mL / x              RADIOLOGY & ADDITIONAL TESTS:    Telemetry Personally Reviewed - sinus bradycardia     Imaging Personally Reviewed -     Imaging Reviewed -     Consultant(s) Notes Reviewed -       Care Discussed with Consultants/Other Providers -

## 2019-09-19 NOTE — PROGRESS NOTE ADULT - PROBLEM SELECTOR PLAN 2
Type 2 NTSEMI; HsT on admission noted to be 59 with repeat being 66 and then 61 with elevated CK and CKMB. Patient denied any chest pain and EKG with no dynamic ischemic changes  House cardiology consulted and their note stated that "ED called Dr. Brumfield(pt's cardiologist)who said do not give asa or tx like nstemi no meds for high trop just trend and pt can f/u outpt with her given recent normal stress test in August."  Will monitor on telemetry for now.    c/w Aspirin 81mg daily for now

## 2019-09-19 NOTE — DISCHARGE NOTE PROVIDER - HOSPITAL COURSE
72 y/o M with PMH of COPD(not on home O2), Asthma, PTSD, Anxiety, melanoma (h/o surgical resection of muscles of L chest wall), recently diagnosed PE in July and Afib(was supposed to be on eliquis) who is non compliant with medications presented with 2 weeks of acute on chronic worsening SOB, orthopnea, PND, and WATERS, Pt. symptoms likely secondary to anxiety and mild COPD exacerbation in setting of running out of medications.  Admitted to telemetry for elevated troponin. Patient awaiting psychiatry recommendations given concern for underlying PTSD and anxiety disorder driving presenting symptoms. Shortness of breath.  Plan: Differential include underline COPD exacerbation vs ACS vs Anxiety driven vs possible CHF(patient with mild LE edema but no rales on physical examination and pro-BNP mildly elevated      Patient s/p IV Ceftriaxone and IV Azithromycin, IV Solumedrol and Duoneb     RVP negative     F?U Blood cultures     - s.p  IV Azithromycin  and PO prednisone 40mg      - Patient to follow with outpatient  Pulmonogist     Will continue with Proventil and started on Spiriva QD.  Type 2 NTSEMI; HsT on admission noted to be 59 with repeat being 66 and then 61 with elevated CK and CKMB. Patient denied any chest pain and EKG with no dynamic ischemic changes    House cardiology consulted and their note stated that "ED called Dr. Brumfield(pt's cardiologist)who said do not give asa or tx like nstemi no meds for high trop just trend and pt can f/u outpt with her given recent normal stress test in August."    Will monitor on telemetry for now.  Will check 1 more set of cardiac enzyme to trend troponin level COPD exacerbation.  Plan: Patient currently not wheezing on examination and s/p IV Ceftriaxone, Azithromycin, s/p IV Solumedrol and Duoneb     c/w  Spiriva and continue with Proventil     - outpatient pulmonary follow up on discharge.     Pulmonary embolism without acute cor pulmonale, unspecified chronicity, unspecified pulmonary embolism type.  Plan: Patient with supposed diagnosis of PE found at [facility] in July 2019 and was started on Eliquis. CTPA from 08/19 and again today 9/19 revealed no PE.     - Continue Eliquis; Post traumatic stress disorder (PTSD).  Plan: Continue with Trazadone, Clonazepam, Abilify and Wellbutrin daily     - Will reach out to Psychiatry given concern for underlying PTSD and anxiety disorder driving presentation.     Reviewed discharge medications with patient; All new medications requiring new prescription sent to pharmacy of patients choice. Reviewed need for prescription for previous home medicaitons and new prescriptions sent if requested. Patient in agreement and understands.     Patient is hemodynamically stable and without complaints and patient is ready for discharge.  Case discussed and medications reviewed with PMD.  Agreed with above. 72 y/o M with PMH of COPD(not on home O2), Asthma, PTSD, Anxiety, melanoma (h/o surgical resection of muscles of L chest wall), recently diagnosed PE in July and Afib(was supposed to be on eliquis) who is non compliant with medications, recently treated in the ED for COPD exacerbation  on 9/14, presented with 2 weeks of acute on chronic worsening SOB, orthopnea, PND, and WATERS.  HsT on admission noted to be 59 with repeat being 66 and then 61 with elevated CK and CKMB. Patient denied any chest pain and EKG with no dynamic ischemic changes, c/w Type 2 NSTEMI. House cardiology consulted and their note stated that "ED called Dr. Brumfield(pt's cardiologist)who said do not give asa or tx like nstemi no meds for high trop just trend and pt can f/u outpt with her given recent normal stress test in August."  Pt. symptoms likely secondary to anxiety and mild COPD exacerbation in setting of running out of medications.  Patient was treated for mild COPD exacerbation with PO steroids for two days to complete 5 day course and Azihtromycin. he was evaluated by psychiatry given concern for underlying PTSD and anxiety disorder driving presenting symptoms. He was medically cleared for discharge to follow up with his PCP.             #Mild COPD exacerbation     RVP negative     - s.p  IV Azithromycin  and PO prednisone 40mg      - Patient to follow with outpatient  Pulmonogist     Will continue with Proventil and  Spiriva QD.              #Atrial Fibrillation:     - Continue eliquis         # Pulmonary embolism without acute cor pulmonale, unspecified chronicity, unspecified pulmonary embolism type.  Plan: Patient with supposed diagnosis of PE found at [facility] in July 2019 and was started on Eliquis. CTPA from 08/19 and again today 9/19 revealed no PE.     - Continue Eliquis        #Post traumatic stress disorder (PTSD)/Anxiety   Plan: Continue with Trazadone, Clonazepam, Abilify and Wellbutrin daily         #Hypothyroidism    - Continue dose of synthroid         Reviewed discharge medications with patient; All new medications requiring new prescription sent to pharmacy of patients choice. Reviewed need for prescription for previous home medicaitons and new prescriptions sent if requested. Patient in agreement and understands.     Patient is hemodynamically stable and without complaints and patient is ready for discharge.  Case discussed and medications reviewed with PMD.  Agreed with above.

## 2019-09-19 NOTE — PROGRESS NOTE ADULT - PROBLEM SELECTOR PLAN 5
Patient with supposed diagnosis of PE found at [facility] in July 2019 and was started on Eliquis. CTPA from 08/19 and again today 9/19 revealed no PE.   - Continue Eliquis;

## 2019-09-19 NOTE — PROGRESS NOTE ADULT - PROBLEM SELECTOR PLAN 7
Continue with Synthroid daily   Elevated TSH level i/s/o medication noncompliance  - Counseled patient on importance of taking medication

## 2019-09-19 NOTE — DISCHARGE NOTE PROVIDER - NSDCCPCAREPLAN_GEN_ALL_CORE_FT
PRINCIPAL DISCHARGE DIAGNOSIS  Diagnosis: COPD exacerbation  Assessment and Plan of Treatment: To slow down the progression of the disease by quitting smoking and avoiding triggers, such as air pollution.  Continue current medications as prescribed to prevent as shortness of breath, COPD exacerbation and to improve your overall health with regular activity and quality of life.  Follow up routine appointment.   Smoking cessation, continue current medications as prescribed. Follow up with your routine physician's appointments.      SECONDARY DISCHARGE DIAGNOSES  Diagnosis: Non-ST elevation MI (NSTEMI)  Assessment and Plan of Treatment: Type 2:  To be asymptomatic, to reduce risks factors such as hypertension, diabetes and hyperlipidemia to lower the risk of blood clots formation; and to prevent complications of coronary artery disease such as worsening chest pain, heart attack and death.   Continue aspirin and apixaban, do not stop unless instructed by your physician.  Continue low salt, fat, cholesterol and carbohydrate low salt &  low cholesterol. Follow up with cardiologist and primary care physician's routine appointment. PRINCIPAL DISCHARGE DIAGNOSIS  Diagnosis: COPD exacerbation  Assessment and Plan of Treatment: Continue your inhalers and follow up with your pulmonogist to get your PFTs done. To slow down the progression of the disease by quitting smoking and avoiding triggers, such as air pollution.  Continue current medications as prescribed to prevent as shortness of breath, COPD exacerbation and to improve your overall health with regular activity and quality of life.  Follow up routine appointment.   Smoking cessation, continue current medications as prescribed. Follow up with your routine physician's appointments.      SECONDARY DISCHARGE DIAGNOSES  Diagnosis: Non-ST elevation MI (NSTEMI)  Assessment and Plan of Treatment: Type 2:  To be asymptomatic, to reduce risks factors such as hypertension, diabetes and hyperlipidemia to lower the risk of blood clots formation; and to prevent complications of coronary artery disease such as worsening chest pain, heart attack and death.   Continue aspirin and apixaban, do not stop unless instructed by your physician.  Continue low salt, fat, cholesterol and carbohydrate low salt &  low cholesterol. Follow up with cardiologist and primary care physician's routine appointment.

## 2019-09-19 NOTE — DISCHARGE NOTE PROVIDER - NSDCFUADDINST_GEN_ALL_CORE_FT
Referrals for outpatient follow up includes  resources on discharge  [facility] [phone]   [facility] -[phone]

## 2019-09-19 NOTE — PROGRESS NOTE ADULT - PROBLEM SELECTOR PLAN 3
Patient currently not wheezing on examination and s/p IV Ceftriaxone, Azithromycin, s/p IV Solumedrol and Duoneb   c/w  Spiriva and continue with Proventil   - outpatient pulmonary follow up on discharge

## 2019-09-19 NOTE — PROGRESS NOTE ADULT - PROBLEM SELECTOR PROBLEM 7
Hypothyroidism Josephine Kenny MD - Attending Physician: Received Hand-off from Dr Spencer. Acutely psychotic, +Hallucinations, medically cleared. Awaiting bed availability for Psych transfer. Will continue to monitor. Pt has bed, [facility] at [facility] under Dr Galdamez

## 2019-09-19 NOTE — PROGRESS NOTE ADULT - PROBLEM SELECTOR PLAN 6
c/w  Lipitor 40mg QHS instead of home dose of Zocor as patient with elevated troponin and CTA showing Atherosclerotic disease of the coronary arteries.  -  low cholesterol diet recommended

## 2019-09-19 NOTE — PROGRESS NOTE ADULT - SUBJECTIVE AND OBJECTIVE BOX
Patient is seen and examined. No acute distress noted. Denies any chest pain, palpitations or dizziness. Has occ. SOB. Telemetry reviewed. Shows sinus rhythm with HR 60s-70 and occ. 40s-50s

## 2019-09-19 NOTE — DISCHARGE NOTE PROVIDER - CARE PROVIDERS DIRECT ADDRESSES
,DirectAddress_Unknown,DirectAddress_Unknown ,DirectAddress_Unknown ,DirectAddress_Unknown,DirectAddress_Unknown,[email or web address]

## 2019-09-19 NOTE — DISCHARGE NOTE NURSING/CASE MANAGEMENT/SOCIAL WORK - NSDCPEEMAIL_GEN_ALL_CORE
[facility] email [email or web address]

## 2019-09-19 NOTE — PROGRESS NOTE ADULT - PROBLEM SELECTOR PLAN 8
Continue with Trazadone, Clonazepam, Abilify and Wellbutrin daily   - Will consult  Psychiatry consult

## 2019-09-19 NOTE — DISCHARGE NOTE PROVIDER - CARE PROVIDER_API CALL
Dr Brumfield,   Phone: (   )    -  Fax: (   )    -  Follow Up Time: 1 week    OUtpatient Pulmonologist,   Phone: (   )    -  Fax: (   )    -  Follow Up Time: 1 week Dr Brumfield,   Phone: (   )    -  Fax: (   )    -  Follow Up Time: 1 week Dr Brumfield,   Phone: (   )    -  Fax: (   )    -  Follow Up Time: 1 week    juan pablo   [address] [facility]  Phone: (   )    -  Fax: (   )    -  Follow Up Time: 1 week    Fiorella BeltranDO; MPH)  Internal Medicine; Occupational Medicine; Preventive Medicine  [address] [place] [address] [address] [place] [address]  Phone: [phone]  Fax: [phone]  Follow Up Time: 1 week

## 2019-09-19 NOTE — PROGRESS NOTE ADULT - PROBLEM SELECTOR PLAN 1
Most likely secondary to  COPD exacerbation  and  Anxiety driven   Patient s/p IV Ceftriaxone and IV Azithromycin, IV Solumedrol and Duoneb   RVP negative   F?U Blood cultures   - s.p  IV Azithromycin  and PO prednisone 40mg    - Patient to follow with outpatient  Pulmonogist   Will continue with Proventil and Spiriva QD

## 2019-09-19 NOTE — DISCHARGE NOTE NURSING/CASE MANAGEMENT/SOCIAL WORK - PATIENT PORTAL LINK FT
You can access the FollowMyHealth Patient Portal offered by [facility] by registering at the following website: [email or web address]. By joining [employer]’s FollowMyHealth portal, you will also be able to view your health information using other applications (apps) compatible with our system.

## 2019-09-19 NOTE — PROGRESS NOTE ADULT - ASSESSMENT
74 y/o M with PMH of COPD(not on home O2), Asthma, PTSD, Anxiety, melanoma (h/o surgical resection of muscles of L chest wall), recently diagnosed PE in July and Afib(was supposed to be on eliquis) who is non compliant with medications presented with 2 weeks of acute on chronic worsening SOB, orthopnea, PND, and WATERS, Pt. symptoms likely secondary to anxiety and mild COPD exacerbation in setting of running out of medications.  Patient is medically and psychiatrically cleared for discharge.     +Elevated troponin Type 2 nstemi i/s/o anxiety attack +/ mild COPD exacerbation   +COPD exacerbation-s/p IV Azithromycin, Duoneb and IV Solumedrol;   - Patient without wheezing on exam, will discontinue steroids  - Continue albuterol and spiriva  - ambulatory pulse oxygen 98%     #Advanced Care planning: Discussed with patient regarding advanced care planning. Stated that in the event he cannot make decisions for himself, he would like his sister: Virginia Turner to make decision - [phone]. He stated he would want to be full code.

## 2019-09-19 NOTE — DISCHARGE NOTE NURSING/CASE MANAGEMENT/SOCIAL WORK - NSDCPEWEB_GEN_ALL_CORE
[place] website --- [email or web address] [email or web address] website --- [email or web address]

## 2019-09-22 LAB
BACTERIA BLD CULT: SIGNIFICANT CHANGE UP
BACTERIA BLD CULT: SIGNIFICANT CHANGE UP

## 2019-10-16 ENCOUNTER — FORM ENCOUNTER (OUTPATIENT)
Age: 74
End: 2019-10-16

## 2020-09-17 NOTE — ED ADULT NURSE NOTE - CHIEF COMPLAINT QUOTE
Detail Level: Generalized
Detail Level: Zone
Detail Level: Detailed
Being treated with eliquis for PE last dose 2 weeks ago.  PCP request evaluation CT

## 2020-12-16 PROBLEM — Z12.11 ENCOUNTER FOR SCREENING COLONOSCOPY: Status: RESOLVED | Noted: 2018-01-25 | Resolved: 2020-12-16

## 2021-02-17 NOTE — ED ADULT TRIAGE NOTE - NSTRIAGECARE_GEN_A_ER
INPATIENT PROGRESS NOTE (Verified)      DATE OF VISIT:  03/27/2017        SUBJECTIVE:    The patient is intubated.  not much following command  sedation is off since this am  Fio2 is 40% with PEEP is 5  wbc improved  still spiking high grade fever  xray chest still with extensive pneumonia. minimal better  not much secreation from ET tube  still all respiratory culture negative  The patient is tolerating current antibiotic so far.    Vital Signs (last 24 hrs)_____  Last Charted___________  Temp Tympanic    H 99.3F  (MAR 27 12:00)  Heart Rate Apical    97 bpm  (MAR 27 08:20)  Resp Rate          H 33br/min  (MAR 27 12:00)  SBP      111 mmHg  (MAR 27 12:00)  DBP      L 55mmHg  (MAR 27 12:00)  SpO2      97 %  (MAR 27 11:00)        GENERAL:  She is not much following command.     HEENT:  ET  tube noted.     NECK:  Supple.     HEART:  s1/s2 +    LUNGS:  Bilateral decreased breath sounds.     ABDOMEN:  Soft, nontender.     EXTREMITIES:  Lower extremities no any redness.     SKIN:  There is no any major rash.  Lamb catheter present.    Labs (Last four charted values)  WBC                  H 11.2 (MAR 27) H 22.4 (MAR 26) H 22.6 (MAR 25) H 23.8 (MAR 24)   Hgb                  L 8.6 (MAR 27) L 8.6 (MAR 26) L 8.2 (MAR 25) C 6.9 (MAR 24)   Hct                  L 29 (MAR 27) L 29 (MAR 26) L 27 (MAR 25) L 23 (MAR 24)   Plt                  233 (MAR 27) 253 (MAR 26) 221 (MAR 25) 207 (MAR 24)   Na                   H 147 (MAR 27) H 147 (MAR 26) H 147 (MAR 25) H 147 (MAR 24)   K                    4.7 (MAR 27) 5.1 (MAR 26) H 5.2 (MAR 25) H 5.5 (MAR 25)   CO2                  21 (MAR 27) 23 (MAR 26) 24 (MAR 25) 23 (MAR 24)   Cl                   H 118 (MAR 27) H 116 (MAR 26) H 118 (MAR 25) H 116 (MAR 24)   Cr                   0.75 (MAR 27) 0.74 (MAR 26) 0.78 (MAR 25) 0.72 (MAR 24)   BUN                  H 61 (MAR 27) H 58 (MAR 26) H 63 (MAR 25) H 51 (MAR 24)   Glucose              H 227 (MAR 27) H 199 (MAR 26) H  234 (MAR 25) H 199 (MAR 24)   Mg                   H 3.0 (MAR 26) H 3.0 (MAR 25) 2.4 (MAR 21) 2.0 (MAR 17)   Phos                 2.6 (MAR 21) 4.3 (MAR 17)   Ca                   L 7.4 (MAR 27) L 8.2 (MAR 26) L 8.0 (MAR 25) L 8.3 (MAR 24)   PT                   10.4 (MAR 12)   INR                  0.9 (MAR 12)   PTT                  L 21 (MAR 12)   Troponin             <0.01 (MAR 16) 0.02 (MAR 12)         MICROBIOLOGICAL DATA:    1. March 16 sputum culture is negative.  2. February 17 bronch culture including the AFB is negative.  3. March 19 blood culture is negative.  4. March 20 bronch culture is negative so far.    Other lab:    PJP silver stain negative  Legionella ag negative  Mycoplasma PCR negative from blood and BAL  Cryptococcus ag negative  G6PD normal  CMV PCR negative  CMV Ig M is negative  chlamydia serology negative    CURRENT ANTIBIOTICS:    1. Atovaquone since 20 March.  2.  doxycycline since 20 March.  3.  meropenem since 20 March.  4. Ganciclovir since 25 march  5.  levofloxacin from 12 March till 20 March.  6. Vancomycin from 16 March till 20 much.  7. Zosyn from 16 March till 20 March.    ASSESSMENT AND PLAN:  An 83-year-old female with history of CLL, was on Rituxan and Imbruvica, admitted to our hospital on 12 March because of the pneumonia.    1 severe pneumonia in Immunocompromised patient: all culture negative so far. concern for viral vs atypical infection. Vent requriment is improving  2 Fever  3 Leukocytosis: improving, pt also has CLL.  4 Transaminitis: resolved  5  Hypogammaglobinemia.  5 CLL was on Imbruvica and Rituxan.      PLAN:      1 Continue Doxycycline, Meropenem, Atovaquone and Ganciclovir iv for now  2  PJP silver stain is negative but sensitivity is low, PJP DFA is pending  3 will f.u on [facility] Respiratory Viral PCR and Urine histo/blasto  4  pending HHV 6 serology.  5 ID will follow this patient closely        Total time spent more than 45 min, plan discussed with family and  nurse.        Zaki Emanuel MD, MPH  [facility] Infectious Diseases   [address] [id number]    PHONE:  [phone]  FAX:        [phone]            EKG

## 2021-05-07 ENCOUNTER — NON-APPOINTMENT (OUTPATIENT)
Age: 76
End: 2021-05-07

## 2021-07-07 NOTE — ED ADULT NURSE NOTE - NSIMPLEMENTINTERV_GEN_ALL_ED
Patient: Pool Talavera Date: 2021   : 1943 Attending: Keegan Pérez DO   78 year old male      Chief Complaint: No chief complaint on file.      Subjective: Pt states his pain is controlled, denies any nausea or vomiting. States he wants to keep his [place] for one more night.    Problem List:   Patient Active Problem List   Diagnosis   • Hypertension   • Dyslipidemia   • Impaired fasting glucose   • Gout   • Benign prostatic hyperplasia with lower urinary tract symptoms   • Chronic pain   • Hypothyroidism   • Osteoarthrosis   • Stage 2 chronic kidney disease   • Closed traumatic PIP dislocation - right ring   • Knee effusion, left   • Internal derangement of right knee   • Ascending aortic aneurysm (CMS/HCC)   • Nonrheumatic aortic valve stenosis   • DJD R knee   • DJD L kne   • History of CVA (cerebrovascular accident)   • History of hypothyroidism   • Obstructive sleep apnea syndrome   • Squamous cell carcinoma in situ (SCCIS) of skin   • Allergic bronchitis   • S/P AVR (aortic valve replacement)   • S/P aorta repair   • S/P left atrial appendage ligation   • S/P pericardial window creation   • Esophageal adenocarcinoma (CMS/HCC)   • Pulmonary emboli (CMS/HCC)   • H/O esophagectomy   • s/p robotic transhiatal esophagectomy with transcervical endoscopic esophageal mobilization    • S/P robotic cholecystectomy   • L TKA 2019   • Pain--Interventional Pain Management   • Lumbosacral radiculopathy at S1   • Polyneuropathy       Allergies: ALLERGIES:  No Known Allergies      Physical Exam    General - Patient is in no acute distress. Alert and oriented to time, place and person.     MUSCULOSKELETAL :No cyanosis or edema.  Strength 5/5 B/L Upper extremities, 5/5  B/L Lower extremities,  Derm: No skin lesions, warm, and dry.   CV - Regular rate and rhythm without additional heart sounds. No carotid bruits. Pedal pulses 2+, No edema  Pulm - Lungs are clear to auscultation bilaterally. No wheezes,  Implemented All Universal Safety Interventions:  [place] to call system. Call bell, personal items and telephone within reach. Instruct patient to call for assistance. Room bathroom lighting operational. Non-slip footwear when patient is off stretcher. Physically safe environment: no spills, clutter or unnecessary equipment. Stretcher in lowest position, wheels locked, appropriate side rails in place. rales or rhonchi. Good aeration throughout with normal I:E ratio.   Abd - Soft, non-tender and non-distended. Bowel sounds are normoactive. No guarding or rebound tenderness. No hepatosplenomegaly  Neuro - Alert and orient to person, place and time. CNs II-XII are intact. Strength 5/5 B/L Upper extremities, 5/5  B/L Lower extremities, sensation in tact, and coordination are grossly intact.  Psych: No anxiety or depression, normal affect.    Medications/Infusions: Reviewed    Review of Systems: All systems reviewed and negative except for those mentioned in the history of present illness                Vital Last Value 24 Hour Range   Temperature 97.6 °F (36.4 °C) (07/07/21 1345) Temp  Min: 97.6 °F (36.4 °C)  Max: 97.8 °F (36.6 °C)   Pulse 78 (07/07/21 1345) Pulse  Min: 70  Max: 87   Respiratory 16 (07/07/21 1345) Resp  Min: 16  Max: 18   Non-Invasive  Blood Pressure 113/53 (07/07/21 1345) BP  Min: 111/58  Max: 132/60   Pulse Oximetry 100 % (07/07/21 1345) SpO2  Min: 98 %  Max: 100 %     Vital Today Admitted   Weight 105.3 kg (07/06/21 0642) Weight: 105.3 kg (07/06/21 0642)   Height N/A Height: 6' 3\" (190.5 cm) (07/06/21 0642)   BMI N/A BMI (Calculated): 29.02 (07/06/21 0642)       Intake/Output:      Intake/Output Summary (Last 24 hours) at 7/7/2021 1633  Last data filed at 7/7/2021 1100  Gross per 24 hour   Intake --   Output 2343 ml   Net -2343 ml               Laboratory Results:   Recent Labs   Lab 07/07/21  0611   WBC 5.5   HCT 24.7*   HGB 8.0*   *       Imaging: No results found.               Assessment and Plan:      1. Status post laminectomy:  Continue PT OT, pain control. H/h has dropped , continue to monitor h/h closely.  2. Acute blood loss anemia: monitor h/h .  3. History of esophageal adenocarcinoma:  Status post robotic transhiatal esophagectomy with transcervical endoscopic esophageal mobilization power plasty  4. History of aortic valve repair/aneurysm repair:  Has not followed up with  cardiology will need to follow-up as an outpatient.  5. Essential hypertension:  Blood pressure stable.  6. History of DVT/pulmonary embolism(3/2019):  IVC filter 07/2019, was  On Eliquis, to and held 4 days prior to surgery.  Resume once okay with Neurosurgery.  7. Dyslipidemia:  Continue statin.  8. BPH:  Stable.  9. Hypothyroidism:  Continue Synthroid.  10. Neuropathy:  Continue Lyrica.  11. History of gout:  Continue allopurinol  12. DVT prophylaxis:  Per Neurosurgery          Edward Brewster MD  Pager: [phone]  From 7 pm to 7 am please call on call pager: [phone].

## 2022-11-30 NOTE — PROGRESS NOTE ADULT - PROVIDER SPECIALTY LIST ADULT
Cardiology
Internal Medicine
Pulmonology
Pulmonology
chest wall non-tender, breathing is unlabored without accessory muscle use, normal breath sounds
Cardiology
Pulmonology

## 2023-01-25 NOTE — ED PROVIDER NOTE - CROS ED CARDIOVAS ALL NEG
Awake alert  Bilateral BS,   Hemodynamic intact  Abdomen soft  Tender lower back and foot swelling  Excellent distal pulses    Trauma scan completed, isolated left calcaneous fracture.  No trauma surgical intervention required  -Disposition per ED  -pain control prn  -f/u labs  -dispo per ED if no injuries
- - -

## 2023-08-05 NOTE — PROGRESS NOTE ADULT - PROBLEM SELECTOR PLAN 1
Bronchodilators  Steroids  Oxygen supp  Echo noted.   F/u CXR  Spiriva  Pfts as OP. Acute on chronic anemia

## 2023-09-11 NOTE — PATIENT PROFILE ADULT - PACKS PER DAY
Tobacco Treatment Consult  9/11/2023, 3:12 PM    Patient admitted on: 9/8/2023   Patient admitted for: Abnormal electrocardiogram [R94.31]  SOB (shortness of breath) [R06.02]  NSTEMI (non-ST elevated myocardial infarction) (H) [I21.4]  Cellulitis of left lower extremity [L03.116]  Heart failure, unspecified HF chronicity, unspecified heart failure type (H) [I50.9]   Patient seen on: 9/11/2023    Reviewed patient's smoking history with patient and updated their smoking status in patient's substance use history. He states he is currently they smoking 1/2 pack/day. Zackary declined tobacco treatment counseling and resource materials at this time. He has no interest in quitting at this time.    Total 2 minutes spent in smoking cessation, and 20 minutes spent in chart review, care coordination, and documentation.    Annalise Cortes, RT, Chronic Pulmonary Disease Specialist & Certified Tobacco Treatment Specialist  Phone [phone]  
3

## 2024-07-30 NOTE — ED PROVIDER NOTE - CARDIOVASCULAR [-], MLM
89 year old male, (Slovenian speaking, daughter at bedside, unable to hear well with , used daughter as ), with PMHx of, HTN, CAD s/p PCI w/HAYDER, mixed restrictive and obstructive pulmonary disease, SESAR (not on CPAP), Habematolel,  PAF on Eliquis ( on hold since saturday), CHB s/p dual chamber( [facility]) PPM implantation on 2/21/2017, who has developed progressive fatigue and dyspnea with ADL referred  for possible CRT upgrade given his generator is at [facility].  Card: Dr. Burkett  ECHO(12/29/2023): LVEF 27%, severely decreased systolic function. Abnormal (paradoxical) septal motion consistent with RV pacemaker. Mild (grade I) LV diastolic dysfunction. The apex is dyskinetic. The apical septal segment and apical lateral segment are akinetic. The entire anterior wall, entire inferior wall, basal and mid anterolateral wall, basal and mid anterior septum, basal and mid inferior septum, and basal and mid inferolateral wall are hypokinetic. LA mildly dilated (JB 21.98). Mild AR/TR. Moderate MR. Mild pHTN.  NUCLEAR STRESS TEST(12/29/2023): Large sized moderate to severe, fixed defect in the inferoapical, apical lateral walls consistent with infarct. There is a medium size moderate, fixed defect in the inferior wall consistent with infarct. There is a small sized, mild to moderate fixed defect in the mid to basal anteroseptum consistent with infarct. No evidence of ischemia. no chest pain

## 2025-06-24 NOTE — PROGRESS NOTE ADULT - PROBLEM SELECTOR PLAN 5
- pt on citalopram, trazodone, buproprion, clonazepam  - continue home meds Patient unable to complete